# Patient Record
Sex: FEMALE | Race: WHITE | Employment: OTHER | URBAN - METROPOLITAN AREA
[De-identification: names, ages, dates, MRNs, and addresses within clinical notes are randomized per-mention and may not be internally consistent; named-entity substitution may affect disease eponyms.]

---

## 2022-09-29 ENCOUNTER — HOSPITAL ENCOUNTER (INPATIENT)
Age: 57
LOS: 13 days | Discharge: ANOTHER HOSPITAL | DRG: 426 | End: 2022-10-13
Attending: INTERNAL MEDICINE | Admitting: INTERNAL MEDICINE
Payer: MEDICAID

## 2022-09-29 ENCOUNTER — APPOINTMENT (OUTPATIENT)
Dept: CT IMAGING | Age: 57
DRG: 426 | End: 2022-09-29
Payer: MEDICAID

## 2022-09-29 ENCOUNTER — APPOINTMENT (OUTPATIENT)
Dept: GENERAL RADIOLOGY | Age: 57
DRG: 426 | End: 2022-09-29
Payer: MEDICAID

## 2022-09-29 DIAGNOSIS — R41.82 ACUTE ALTERATION IN MENTAL STATUS: Primary | ICD-10-CM

## 2022-09-29 DIAGNOSIS — E87.6 HYPOKALEMIA: ICD-10-CM

## 2022-09-29 DIAGNOSIS — Z86.69 HISTORY OF SEIZURE DISORDER: ICD-10-CM

## 2022-09-29 DIAGNOSIS — N17.9 ACUTE KIDNEY INJURY (HCC): ICD-10-CM

## 2022-09-29 DIAGNOSIS — R13.10 DYSPHAGIA, UNSPECIFIED TYPE: ICD-10-CM

## 2022-09-29 DIAGNOSIS — R62.50 DEVELOPMENTAL DELAY: ICD-10-CM

## 2022-09-29 DIAGNOSIS — Z86.73 HISTORY OF ISCHEMIC STROKE: ICD-10-CM

## 2022-09-29 LAB
ANION GAP SERPL CALCULATED.3IONS-SCNC: 14 MMOL/L (ref 3–16)
BASE EXCESS VENOUS: 6.3 MMOL/L
BASOPHILS ABSOLUTE: 0.1 K/UL (ref 0–0.2)
BASOPHILS RELATIVE PERCENT: 0.9 %
BUN BLDV-MCNC: 42 MG/DL (ref 7–20)
CALCIUM SERPL-MCNC: 9.2 MG/DL (ref 8.3–10.6)
CARBOXYHEMOGLOBIN: 0.6 %
CHLORIDE BLD-SCNC: 116 MMOL/L (ref 99–110)
CO2: 29 MMOL/L (ref 21–32)
CREAT SERPL-MCNC: 1.3 MG/DL (ref 0.6–1.1)
EOSINOPHILS ABSOLUTE: 0 K/UL (ref 0–0.6)
EOSINOPHILS RELATIVE PERCENT: 0.1 %
GFR AFRICAN AMERICAN: 51
GFR NON-AFRICAN AMERICAN: 42
GLUCOSE BLD-MCNC: 106 MG/DL (ref 70–99)
HCO3 VENOUS: 30 MMOL/L (ref 23–29)
HCT VFR BLD CALC: 40.1 % (ref 36–48)
HEMOGLOBIN: 12.6 G/DL (ref 12–16)
LACTIC ACID: 1.3 MMOL/L (ref 0.4–2)
LYMPHOCYTES ABSOLUTE: 1.3 K/UL (ref 1–5.1)
LYMPHOCYTES RELATIVE PERCENT: 12.5 %
MCH RBC QN AUTO: 23.4 PG (ref 26–34)
MCHC RBC AUTO-ENTMCNC: 31.4 G/DL (ref 31–36)
MCV RBC AUTO: 74.5 FL (ref 80–100)
METHEMOGLOBIN VENOUS: 0.6 %
MONOCYTES ABSOLUTE: 0.8 K/UL (ref 0–1.3)
MONOCYTES RELATIVE PERCENT: 7.7 %
NEUTROPHILS ABSOLUTE: 8.2 K/UL (ref 1.7–7.7)
NEUTROPHILS RELATIVE PERCENT: 78.8 %
O2 SAT, VEN: 99 %
O2 THERAPY: ABNORMAL
PCO2, VEN: 40.8 MMHG (ref 40–50)
PDW BLD-RTO: 19.9 % (ref 12.4–15.4)
PH VENOUS: 7.48 (ref 7.35–7.45)
PLATELET # BLD: 205 K/UL (ref 135–450)
PLATELET SLIDE REVIEW: ADEQUATE
PMV BLD AUTO: 10.8 FL (ref 5–10.5)
PO2, VEN: 167 MMHG
POTASSIUM SERPL-SCNC: 2.8 MMOL/L (ref 3.5–5.1)
RBC # BLD: 5.38 M/UL (ref 4–5.2)
SLIDE REVIEW: ABNORMAL
SODIUM BLD-SCNC: 159 MMOL/L (ref 136–145)
TCO2 CALC VENOUS: 32 MMOL/L
TOTAL CK: 424 U/L (ref 26–192)
WBC # BLD: 10.4 K/UL (ref 4–11)

## 2022-09-29 PROCEDURE — 85025 COMPLETE CBC W/AUTO DIFF WBC: CPT

## 2022-09-29 PROCEDURE — 83605 ASSAY OF LACTIC ACID: CPT

## 2022-09-29 PROCEDURE — 70450 CT HEAD/BRAIN W/O DYE: CPT

## 2022-09-29 PROCEDURE — 96365 THER/PROPH/DIAG IV INF INIT: CPT

## 2022-09-29 PROCEDURE — 99285 EMERGENCY DEPT VISIT HI MDM: CPT

## 2022-09-29 PROCEDURE — 96361 HYDRATE IV INFUSION ADD-ON: CPT

## 2022-09-29 PROCEDURE — 82803 BLOOD GASES ANY COMBINATION: CPT

## 2022-09-29 PROCEDURE — 71045 X-RAY EXAM CHEST 1 VIEW: CPT

## 2022-09-29 PROCEDURE — 6360000002 HC RX W HCPCS: Performed by: PHYSICIAN ASSISTANT

## 2022-09-29 PROCEDURE — 80048 BASIC METABOLIC PNL TOTAL CA: CPT

## 2022-09-29 PROCEDURE — 93005 ELECTROCARDIOGRAM TRACING: CPT | Performed by: INTERNAL MEDICINE

## 2022-09-29 PROCEDURE — 2580000003 HC RX 258: Performed by: PHYSICIAN ASSISTANT

## 2022-09-29 PROCEDURE — 82550 ASSAY OF CK (CPK): CPT

## 2022-09-29 PROCEDURE — 6360000004 HC RX CONTRAST MEDICATION: Performed by: PHYSICIAN ASSISTANT

## 2022-09-29 PROCEDURE — 96366 THER/PROPH/DIAG IV INF ADDON: CPT

## 2022-09-29 PROCEDURE — 71260 CT THORAX DX C+: CPT | Performed by: PHYSICIAN ASSISTANT

## 2022-09-29 PROCEDURE — 81001 URINALYSIS AUTO W/SCOPE: CPT

## 2022-09-29 RX ORDER — POTASSIUM CHLORIDE 7.45 MG/ML
10 INJECTION INTRAVENOUS
Status: DISPENSED | OUTPATIENT
Start: 2022-09-29 | End: 2022-09-30

## 2022-09-29 RX ORDER — 0.9 % SODIUM CHLORIDE 0.9 %
1000 INTRAVENOUS SOLUTION INTRAVENOUS ONCE
Status: COMPLETED | OUTPATIENT
Start: 2022-09-29 | End: 2022-09-29

## 2022-09-29 RX ORDER — 0.9 % SODIUM CHLORIDE 0.9 %
1000 INTRAVENOUS SOLUTION INTRAVENOUS ONCE
Status: COMPLETED | OUTPATIENT
Start: 2022-09-29 | End: 2022-09-30

## 2022-09-29 RX ADMIN — IOPAMIDOL 75 ML: 755 INJECTION, SOLUTION INTRAVENOUS at 20:23

## 2022-09-29 RX ADMIN — SODIUM CHLORIDE 1000 ML: 9 INJECTION, SOLUTION INTRAVENOUS at 18:53

## 2022-09-29 RX ADMIN — Medication 10 MEQ: at 22:14

## 2022-09-29 RX ADMIN — SODIUM CHLORIDE 1000 ML: 9 INJECTION, SOLUTION INTRAVENOUS at 22:11

## 2022-09-29 ASSESSMENT — PAIN - FUNCTIONAL ASSESSMENT: PAIN_FUNCTIONAL_ASSESSMENT: ADULT NONVERBAL PAIN SCALE (NPVS)

## 2022-09-29 NOTE — ED PROVIDER NOTES
[address] [facility]        Pt Name: Gerson Prajapati  MRN: [id number]  [place] 1965  Date of evaluation: 9/29/2022  Provider: Michelle Mcarthur PA-C  PCP: Reva Le MD  Note Started: 7:01 PM EDT      KIM. I have evaluated this patient. My supervising physician was available for consultation. Triage CHIEF COMPLAINT       Chief Complaint   Patient presents with    Altered Mental Status     patient comes with report of being \"unresponsive for 9 hours\" typically alert and converses per ems report. ems reports patient did not receive any medications today         HISTORY OF PRESENT ILLNESS   (Location/Symptom, Timing/Onset, Context/Setting, Quality, Duration, Modifying Factors, Severity)  Note limiting factors. Chief Complaint: Unresponsive for 9 hours    Gerson Prajapati is a 62 y.o. female who presents with history given as above with patient coming in from the nursing facility. Nursing here in the ER also tells me patient has been unresponsive for at least 9 hours today with no medicines given. However on prompting, patient does open her eyes, responds to pain, and otherwise very much with altered mental status. Reportedly patient typically is conversive and responsive. I see in the patient's electronic medical record that she has a history of sickle cell disease, ischemic stroke that appears may be to have happened during the summer 2022, seizure disorder, developmental delay, possible lupus, previous admission for altered mental status, no anticoagulation. On Plavix and aspirin but no other anticoagulation. I do not see history of DVT or PE. Nursing Notes were all reviewed and agreed with or any disagreements were addressed in the HPI.     REVIEW OF SYSTEMS    (2-9 systems for level 4, 10 or more for level 5)     Review of Systems  Unable to be obtained secondary to patient's altered mental status  PAST MEDICAL HISTORY   History reviewed. No pertinent past medical history. SURGICAL HISTORY   History reviewed. No pertinent surgical history. CURRENTMEDICATIONS       Previous Medications    No medications on file       ALLERGIES     Patient has no known allergies. FAMILYHISTORY     History reviewed. No pertinent family history. SOCIAL HISTORY       Social History     Socioeconomic History    Marital status: Single     Spouse name: None    Number of children: None    Years of education: None    Highest education level: None       SCREENINGS    Gwyn Coma Scale  Eye Opening: None  Best Verbal Response: None  Best Motor Response: None  Gwyn Coma Scale Score: 3  OPO Notified: Not yet        PHYSICAL EXAM    (up to 7 for level 4, 8 or more for level 5)     ED Triage Vitals [09/29/22 1824]   BP Temp Temp src Heart Rate Resp SpO2 Height Weight   96/79 98.5 °F (36.9 °C) -- (!) 125 16 100 % -- 138 lb 10.7 oz (62.9 kg)       Physical Exam  Vitals and nursing note reviewed. Constitutional:       Appearance: She is ill-appearing. She is not diaphoretic. HENT:      Head: Normocephalic and atraumatic. Right Ear: External ear normal.      Left Ear: External ear normal.      Nose: Nose normal.      Mouth/Throat:      Mouth: Mucous membranes are dry. Pharynx: No posterior oropharyngeal erythema. Eyes:      General:         Right eye: No discharge. Left eye: No discharge. Conjunctiva/sclera: Conjunctivae normal.   Cardiovascular:      Rate and Rhythm: Tachycardia present. Pulses: Normal pulses. Heart sounds: Normal heart sounds. Pulmonary:      Effort: Pulmonary effort is normal. No respiratory distress. Abdominal:      General: Bowel sounds are normal.      Palpations: Abdomen is soft. Tenderness: There is no abdominal tenderness. There is no right CVA tenderness or left CVA tenderness. Musculoskeletal:         General: No swelling or tenderness.       Cervical back: Normal range of motion and neck supple. No tenderness. Right lower leg: No edema. Left lower leg: No edema. Lymphadenopathy:      Cervical: No cervical adenopathy. Skin:     General: Skin is warm and dry. Capillary Refill: Capillary refill takes 2 to 3 seconds. Comments: Very dry appearing skin   Neurological:      Mental Status: She is oriented to person, place, and time. She is lethargic. GCS: GCS eye subscore is 2. GCS verbal subscore is 1. GCS motor subscore is 4. Comments: Very minimal response other than to pain and to opening her eyes   Psychiatric:         Mood and Affect: Mood normal.         Behavior: Behavior normal.       DIAGNOSTIC RESULTS   LABS:    Labs Reviewed   CBC WITH AUTO DIFFERENTIAL - Abnormal; Notable for the following components:       Result Value    RBC 5.38 (*)     MCV 74.5 (*)     MCH 23.4 (*)     RDW 19.9 (*)     MPV 10.8 (*)     Neutrophils Absolute 8.2 (*)     All other components within normal limits   BASIC METABOLIC PANEL - Abnormal; Notable for the following components:    Sodium 159 (*)     Potassium 2.8 (*)     Chloride 116 (*)     Glucose 106 (*)     BUN 42 (*)     Creatinine 1.3 (*)     GFR Non- 42 (*)     GFR  51 (*)     All other components within normal limits    Narrative:     Kostafarhad Bairon tel. [phone],  Chemistry results called to and read back by Caio Lynn, 09/29/2022  20:03, by Raquel Santa - Abnormal; Notable for the following components:    Ketones, Urine TRACE (*)     Protein, UA 30 (*)     All other components within normal limits   CK - Abnormal; Notable for the following components:     Total  (*)     All other components within normal limits   BLOOD GAS, VENOUS - Abnormal; Notable for the following components:    pH, Greg 7.480 (*)     HCO3, Venous 30 (*)     All other components within normal limits   LACTIC ACID   LACTATE, SEPSIS   LACTATE, SEPSIS MICROSCOPIC URINALYSIS       When ordered, only abnormal lab results are displayed. All other labs were within normal range or not returned as of this dictation. EKG: When ordered, EKG's are interpreted by the Emergency Department Physician in the absence of a cardiologist.  Please see their note for interpretation of EKG. RADIOLOGY:   Non-plain film images such as CT, Ultrasound and MRI are read by the radiologist. Plain radiographic images are visualized andpreliminarily interpreted by the  ED Provider with the below findings:        Interpretation perthe Radiologist below, if available at the time of this note:    CT HEAD WO CONTRAST   Final Result   1. No acute intracranial bleed. 2. Multifocal age indeterminate lacunar strokes involve right centrum   semiovale, bilateral basal ganglia and left caudate lobe. 3.  White matter hypoattenuation described is typical of microvascular   ischemic disease or as sequela of dysmyelinating/demyelinating processes. 4.  Vascular calcifications are noted reflecting calcific atherosclerosis. CT CHEST PULMONARY EMBOLISM W CONTRAST   Preliminary Result   No evidence of pulmonary embolism or acute pulmonary abnormality. Mild left   lower lobe atelectatic changes. XR CHEST PORTABLE   Final Result   No radiographic evidence of acute cardiopulmonary disease. XR CHEST PORTABLE    Result Date: 9/29/2022  EXAMINATION: ONE XRAY VIEW OF THE CHEST 9/29/2022 6:48 pm COMPARISON: None. HISTORY: ORDERING SYSTEM PROVIDED HISTORY: altered mental status TECHNOLOGIST PROVIDED HISTORY: Reason for exam:->altered mental status Reason for Exam: ams FINDINGS: The cardiomediastinal and hilar silhouettes appear unremarkable. The lungs appear clear. No pleural effusion evident. No pneumothorax is seen. No acute osseous abnormality is identified. Sequela from ORIF bilateral clavicular fractures with fixation plates and screws.      No radiographic evidence of acute cardiopulmonary disease. PROCEDURES   Unless otherwise noted below, none     Procedures    CRITICAL CARE TIME   Critical Care  There was a high probability of life-threatening clinical deterioration in the patient's condition requiring my urgent intervention. Total critical care time with the patient was greater than 30 minutes excluding separately reportable procedures. Critical care required due to patients altered mental status with hyperkalemia, right acute kidney injury, elevated heart rate, elevated CK and requiring 2 L of fluids and potassium replenishment as well as admission for further care and treatment. I personally saw the patient and the care time documented above is independently provided for greater than 30 minutes and is not concurrent critical care out of the total shared critical care time provided. CONSULTS:  None      EMERGENCY DEPARTMENT COURSE and DIFFERENTIAL DIAGNOSIS/MDM:   Vitals:    Vitals:    09/29/22 1845 09/29/22 2145 09/29/22 2315 09/29/22 2330   BP: 105/81  113/80 104/71   Pulse: (!) 125 (!) 107 96 95   Resp: 20  16 14   Temp:       SpO2: 95%  99% 98%   Weight:           Patient was given thefollowing medications:  Medications   potassium chloride 10 mEq/100 mL IVPB (Peripheral Line) (10 mEq IntraVENous New Bag 9/29/22 2214)   0.9 % sodium chloride bolus (0 mLs IntraVENous Stopped 9/29/22 2208)   iopamidol (ISOVUE-370) 76 % injection 75 mL (75 mLs IntraVENous Given 9/29/22 2023)   0.9 % sodium chloride bolus (1,000 mLs IntraVENous New Bag 9/29/22 2211)         Is this patient to be included in the SEP-1 Core Measure due to severe sepsis or septic shock? No   Exclusion criteria - the patient is NOT to be included for SEP-1 Core Measure due to: Infection is not suspected  This patient presents as above and evaluation and treatment is begun here including IV fluids as patient does appear dry.   EKG and chest x-ray as well as CT head ordered as well as some lab work.  Chest x-ray returns as above. Also CT chest rule out PE and CT head plain return negative as above. Lactic acid not elevated. BMP however shows significant electrolyte derangement with potassium 2.8, sodium 159, chloride 116, and acute kidney injury with BUN 42 with creatinine 1.3. Patient typically has no ongoing kidney disease per electronic medical record. Urinalysis shows trace ketones and 30 protein. Low sodium starting to be addressed in the emergency department with IV. Patient's CBC shows no acute leukocytosis or thrombocytopenia. Blood gas reasonably good. Patient's CK is quite elevated at 424. Patient has received 2 L of fluids while in the emergency department. As time progresses, patient appears to be a bit more wakeful, however still pretty subdued and minimally interactive, not verbally interactive. She does need to be admitted for further care and treatment and consultation to hospitalist is placed. Patient is in fair condition. I am the Primary Clinician of Record. FINAL IMPRESSION      1. Acute alteration in mental status    2. Acute kidney injury ([facility] [address]    3. Hypokalemia    4. Developmental delay    5. History of ischemic stroke    6. History of seizure disorder          DISPOSITION/PLAN   DISPOSITION Decision To Admit 09/29/2022 09:45:59 PM      PATIENT REFERREDTO:  No follow-up provider specified.     DISCHARGE MEDICATIONS:  New Prescriptions    No medications on file       DISCONTINUED MEDICATIONS:  Discontinued Medications    No medications on file              (Please note that portions ofthis note were completed with a voice recognition program.  Efforts were made to edit the dictations but occasionally words are mis-transcribed.)    Kelley Pedro PA-C (electronically signed)             Kelley Pedro PA-C  09/29/22 8661

## 2022-09-30 PROBLEM — N17.9 AKI (ACUTE KIDNEY INJURY) (HCC): Status: ACTIVE | Noted: 2022-09-30

## 2022-09-30 PROBLEM — E87.6 HYPOKALEMIA: Status: ACTIVE | Noted: 2022-09-30

## 2022-09-30 PROBLEM — R41.82 ALTERED MENTAL STATUS: Status: ACTIVE | Noted: 2022-09-30

## 2022-09-30 PROBLEM — E87.0 HYPERNATREMIA: Status: ACTIVE | Noted: 2022-09-30

## 2022-09-30 LAB
ALBUMIN SERPL-MCNC: 3.1 G/DL (ref 3.4–5)
ALP BLD-CCNC: 67 U/L (ref 40–129)
ALT SERPL-CCNC: 30 U/L (ref 10–40)
ANION GAP SERPL CALCULATED.3IONS-SCNC: 12 MMOL/L (ref 3–16)
ANION GAP SERPL CALCULATED.3IONS-SCNC: 13 MMOL/L (ref 3–16)
ANION GAP SERPL CALCULATED.3IONS-SCNC: 14 MMOL/L (ref 3–16)
ANION GAP SERPL CALCULATED.3IONS-SCNC: 7 MMOL/L (ref 3–16)
AST SERPL-CCNC: 40 U/L (ref 15–37)
BACTERIA: NORMAL /HPF
BILIRUB SERPL-MCNC: 0.4 MG/DL (ref 0–1)
BILIRUBIN DIRECT: <0.2 MG/DL (ref 0–0.3)
BILIRUBIN URINE: NEGATIVE
BILIRUBIN, INDIRECT: ABNORMAL MG/DL (ref 0–1)
BLOOD, URINE: NEGATIVE
BUN BLDV-MCNC: 29 MG/DL (ref 7–20)
BUN BLDV-MCNC: 34 MG/DL (ref 7–20)
BUN BLDV-MCNC: 34 MG/DL (ref 7–20)
BUN BLDV-MCNC: 37 MG/DL (ref 7–20)
CALCIUM SERPL-MCNC: 8.4 MG/DL (ref 8.3–10.6)
CALCIUM SERPL-MCNC: 8.4 MG/DL (ref 8.3–10.6)
CALCIUM SERPL-MCNC: 8.6 MG/DL (ref 8.3–10.6)
CALCIUM SERPL-MCNC: 8.6 MG/DL (ref 8.3–10.6)
CHLORIDE BLD-SCNC: 119 MMOL/L (ref 99–110)
CHLORIDE BLD-SCNC: 121 MMOL/L (ref 99–110)
CHLORIDE BLD-SCNC: 122 MMOL/L (ref 99–110)
CHLORIDE BLD-SCNC: 123 MMOL/L (ref 99–110)
CLARITY: CLEAR
CO2: 25 MMOL/L (ref 21–32)
CO2: 25 MMOL/L (ref 21–32)
CO2: 26 MMOL/L (ref 21–32)
CO2: 29 MMOL/L (ref 21–32)
COLOR: YELLOW
CREAT SERPL-MCNC: 0.7 MG/DL (ref 0.6–1.1)
CREAT SERPL-MCNC: 0.7 MG/DL (ref 0.6–1.1)
CREAT SERPL-MCNC: 0.8 MG/DL (ref 0.6–1.1)
CREAT SERPL-MCNC: 0.8 MG/DL (ref 0.6–1.1)
EKG ATRIAL RATE: 126 BPM
EKG DIAGNOSIS: NORMAL
EKG P AXIS: 36 DEGREES
EKG P-R INTERVAL: 148 MS
EKG Q-T INTERVAL: 330 MS
EKG QRS DURATION: 96 MS
EKG QTC CALCULATION (BAZETT): 477 MS
EKG R AXIS: -81 DEGREES
EKG T AXIS: 40 DEGREES
EKG VENTRICULAR RATE: 126 BPM
EPITHELIAL CELLS, UA: 3 /HPF (ref 0–5)
GFR AFRICAN AMERICAN: >60
GFR NON-AFRICAN AMERICAN: >60
GLUCOSE BLD-MCNC: 106 MG/DL (ref 70–99)
GLUCOSE BLD-MCNC: 112 MG/DL (ref 70–99)
GLUCOSE BLD-MCNC: 114 MG/DL (ref 70–99)
GLUCOSE BLD-MCNC: 92 MG/DL (ref 70–99)
GLUCOSE URINE: NEGATIVE MG/DL
HCT VFR BLD CALC: 34.4 % (ref 36–48)
HEMOGLOBIN: 10.7 G/DL (ref 12–16)
HYALINE CASTS: 0 /LPF (ref 0–8)
KETONES, URINE: ABNORMAL MG/DL
LACTIC ACID, SEPSIS: 1 MMOL/L (ref 0.4–1.9)
LEUKOCYTE ESTERASE, URINE: NEGATIVE
MAGNESIUM: 2.1 MG/DL (ref 1.8–2.4)
MAGNESIUM: 2.2 MG/DL (ref 1.8–2.4)
MCH RBC QN AUTO: 22.8 PG (ref 26–34)
MCHC RBC AUTO-ENTMCNC: 31.1 G/DL (ref 31–36)
MCV RBC AUTO: 73.3 FL (ref 80–100)
MICROSCOPIC EXAMINATION: YES
NITRITE, URINE: NEGATIVE
OSMOLALITY: 341 MOSM/KG (ref 275–295)
PDW BLD-RTO: 19.6 % (ref 12.4–15.4)
PH UA: 5.5 (ref 5–8)
PHOSPHORUS: 2.4 MG/DL (ref 2.5–4.9)
PHOSPHORUS: 2.8 MG/DL (ref 2.5–4.9)
PLATELET # BLD: 165 K/UL (ref 135–450)
PMV BLD AUTO: 11.4 FL (ref 5–10.5)
POTASSIUM SERPL-SCNC: 3 MMOL/L (ref 3.5–5.1)
POTASSIUM SERPL-SCNC: 3 MMOL/L (ref 3.5–5.1)
POTASSIUM SERPL-SCNC: 3.1 MMOL/L (ref 3.5–5.1)
POTASSIUM SERPL-SCNC: 3.2 MMOL/L (ref 3.5–5.1)
PROTEIN UA: 30 MG/DL
RBC # BLD: 4.69 M/UL (ref 4–5.2)
RBC UA: 1 /HPF (ref 0–4)
SODIUM BLD-SCNC: 152 MMOL/L (ref 136–145)
SODIUM BLD-SCNC: 155 MMOL/L (ref 136–145)
SODIUM BLD-SCNC: 159 MMOL/L (ref 136–145)
SODIUM BLD-SCNC: 159 MMOL/L (ref 136–145)
SODIUM BLD-SCNC: 163 MMOL/L (ref 136–145)
SPECIFIC GRAVITY UA: 1.08 (ref 1–1.03)
TOTAL CK: 324 U/L (ref 26–192)
TOTAL PROTEIN: 6.4 G/DL (ref 6.4–8.2)
URINE REFLEX TO CULTURE: ABNORMAL
URINE TYPE: ABNORMAL
UROBILINOGEN, URINE: 1 E.U./DL
WBC # BLD: 12.2 K/UL (ref 4–11)
WBC UA: 1 /HPF (ref 0–5)

## 2022-09-30 PROCEDURE — 9990000010 HC NO CHARGE VISIT

## 2022-09-30 PROCEDURE — 6360000002 HC RX W HCPCS: Performed by: INTERNAL MEDICINE

## 2022-09-30 PROCEDURE — 80069 RENAL FUNCTION PANEL: CPT

## 2022-09-30 PROCEDURE — 82550 ASSAY OF CK (CPK): CPT

## 2022-09-30 PROCEDURE — 6360000002 HC RX W HCPCS: Performed by: PHYSICIAN ASSISTANT

## 2022-09-30 PROCEDURE — 84100 ASSAY OF PHOSPHORUS: CPT

## 2022-09-30 PROCEDURE — 85027 COMPLETE CBC AUTOMATED: CPT

## 2022-09-30 PROCEDURE — 36415 COLL VENOUS BLD VENIPUNCTURE: CPT

## 2022-09-30 PROCEDURE — 84295 ASSAY OF SERUM SODIUM: CPT

## 2022-09-30 PROCEDURE — 83735 ASSAY OF MAGNESIUM: CPT

## 2022-09-30 PROCEDURE — 2580000003 HC RX 258: Performed by: INTERNAL MEDICINE

## 2022-09-30 PROCEDURE — 1200000000 HC SEMI PRIVATE

## 2022-09-30 PROCEDURE — 80076 HEPATIC FUNCTION PANEL: CPT

## 2022-09-30 PROCEDURE — 83605 ASSAY OF LACTIC ACID: CPT

## 2022-09-30 PROCEDURE — 83930 ASSAY OF BLOOD OSMOLALITY: CPT

## 2022-09-30 PROCEDURE — 93010 ELECTROCARDIOGRAM REPORT: CPT | Performed by: INTERNAL MEDICINE

## 2022-09-30 RX ORDER — CLOPIDOGREL BISULFATE 75 MG/1
75 TABLET ORAL DAILY
Status: DISCONTINUED | OUTPATIENT
Start: 2022-09-30 | End: 2022-10-13 | Stop reason: HOSPADM

## 2022-09-30 RX ORDER — ASPIRIN 81 MG/1
81 TABLET, CHEWABLE ORAL DAILY
Status: ON HOLD | COMMUNITY
End: 2022-11-01 | Stop reason: HOSPADM

## 2022-09-30 RX ORDER — POTASSIUM CHLORIDE 7.45 MG/ML
10 INJECTION INTRAVENOUS
Status: COMPLETED | OUTPATIENT
Start: 2022-09-30 | End: 2022-09-30

## 2022-09-30 RX ORDER — LEVETIRACETAM 5 MG/ML
500 INJECTION INTRAVASCULAR EVERY 12 HOURS
Status: DISCONTINUED | OUTPATIENT
Start: 2022-09-30 | End: 2022-10-05

## 2022-09-30 RX ORDER — LEVETIRACETAM 500 MG/1
500 TABLET ORAL 2 TIMES DAILY
Status: DISCONTINUED | OUTPATIENT
Start: 2022-09-30 | End: 2022-09-30

## 2022-09-30 RX ORDER — LISINOPRIL 40 MG/1
40 TABLET ORAL DAILY
Status: ON HOLD | COMMUNITY
End: 2022-11-01 | Stop reason: HOSPADM

## 2022-09-30 RX ORDER — POTASSIUM CHLORIDE 20 MEQ/1
40 TABLET, EXTENDED RELEASE ORAL PRN
Status: DISCONTINUED | OUTPATIENT
Start: 2022-09-30 | End: 2022-10-13 | Stop reason: HOSPADM

## 2022-09-30 RX ORDER — MULTIVITAMIN WITH IRON
1 TABLET ORAL DAILY
Status: DISCONTINUED | OUTPATIENT
Start: 2022-09-30 | End: 2022-10-13

## 2022-09-30 RX ORDER — SODIUM CHLORIDE 9 MG/ML
INJECTION, SOLUTION INTRAVENOUS PRN
Status: DISCONTINUED | OUTPATIENT
Start: 2022-09-30 | End: 2022-10-13 | Stop reason: HOSPADM

## 2022-09-30 RX ORDER — VITAMIN B COMPLEX
1000 TABLET ORAL DAILY
Status: DISCONTINUED | OUTPATIENT
Start: 2022-09-30 | End: 2022-10-13

## 2022-09-30 RX ORDER — VERAPAMIL HYDROCHLORIDE 120 MG/1
120 TABLET, FILM COATED ORAL NIGHTLY
Status: DISCONTINUED | OUTPATIENT
Start: 2022-09-30 | End: 2022-10-13

## 2022-09-30 RX ORDER — SODIUM CHLORIDE 0.9 % (FLUSH) 0.9 %
5-40 SYRINGE (ML) INJECTION EVERY 12 HOURS SCHEDULED
Status: DISCONTINUED | OUTPATIENT
Start: 2022-09-30 | End: 2022-10-13 | Stop reason: HOSPADM

## 2022-09-30 RX ORDER — 0.9 % SODIUM CHLORIDE 0.9 %
1000 INTRAVENOUS SOLUTION INTRAVENOUS ONCE
Status: DISCONTINUED | OUTPATIENT
Start: 2022-09-30 | End: 2022-09-30

## 2022-09-30 RX ORDER — GAUZE BANDAGE 2" X 2"
100 BANDAGE TOPICAL DAILY
Status: DISCONTINUED | OUTPATIENT
Start: 2022-09-30 | End: 2022-10-13

## 2022-09-30 RX ORDER — LANOLIN ALCOHOL/MO/W.PET/CERES
1000 CREAM (GRAM) TOPICAL DAILY
Status: DISCONTINUED | OUTPATIENT
Start: 2022-09-30 | End: 2022-10-13

## 2022-09-30 RX ORDER — ACETAMINOPHEN 325 MG/1
650 TABLET ORAL EVERY 6 HOURS PRN
Status: DISCONTINUED | OUTPATIENT
Start: 2022-09-30 | End: 2022-10-13 | Stop reason: HOSPADM

## 2022-09-30 RX ORDER — MIRTAZAPINE 15 MG/1
7.5 TABLET, FILM COATED ORAL DAILY
Status: DISCONTINUED | OUTPATIENT
Start: 2022-09-30 | End: 2022-10-13

## 2022-09-30 RX ORDER — MIRTAZAPINE 7.5 MG/1
7.5 TABLET, FILM COATED ORAL DAILY
Status: ON HOLD | COMMUNITY
End: 2022-11-01 | Stop reason: HOSPADM

## 2022-09-30 RX ORDER — THIAMINE MONONITRATE (VIT B1) 100 MG
100 TABLET ORAL DAILY
Status: ON HOLD | COMMUNITY
End: 2022-11-01 | Stop reason: HOSPADM

## 2022-09-30 RX ORDER — CLOPIDOGREL BISULFATE 75 MG/1
75 TABLET ORAL DAILY
COMMUNITY
End: 2022-10-31

## 2022-09-30 RX ORDER — LANOLIN ALCOHOL/MO/W.PET/CERES
6 CREAM (GRAM) TOPICAL NIGHTLY
Status: ON HOLD | COMMUNITY
End: 2022-11-01 | Stop reason: HOSPADM

## 2022-09-30 RX ORDER — ATORVASTATIN CALCIUM 80 MG/1
80 TABLET, FILM COATED ORAL NIGHTLY
Status: DISCONTINUED | OUTPATIENT
Start: 2022-09-30 | End: 2022-10-13

## 2022-09-30 RX ORDER — FERROUS SULFATE TAB EC 324 MG (65 MG FE EQUIVALENT) 324 (65 FE) MG
324 TABLET DELAYED RESPONSE ORAL EVERY OTHER DAY
Status: DISCONTINUED | OUTPATIENT
Start: 2022-10-01 | End: 2022-10-04

## 2022-09-30 RX ORDER — SODIUM CHLORIDE 0.9 % (FLUSH) 0.9 %
5-40 SYRINGE (ML) INJECTION PRN
Status: DISCONTINUED | OUTPATIENT
Start: 2022-09-30 | End: 2022-10-13 | Stop reason: HOSPADM

## 2022-09-30 RX ORDER — ONDANSETRON 4 MG/1
4 TABLET, ORALLY DISINTEGRATING ORAL EVERY 8 HOURS PRN
Status: DISCONTINUED | OUTPATIENT
Start: 2022-09-30 | End: 2022-10-13 | Stop reason: HOSPADM

## 2022-09-30 RX ORDER — FERROUS SULFATE 325(65) MG
325 TABLET ORAL EVERY OTHER DAY
Status: ON HOLD | COMMUNITY
End: 2022-11-01 | Stop reason: HOSPADM

## 2022-09-30 RX ORDER — LANOLIN ALCOHOL/MO/W.PET/CERES
1000 CREAM (GRAM) TOPICAL DAILY
Status: ON HOLD | COMMUNITY
End: 2022-11-01 | Stop reason: HOSPADM

## 2022-09-30 RX ORDER — POLYETHYLENE GLYCOL 3350 17 G/17G
17 POWDER, FOR SOLUTION ORAL DAILY PRN
Status: DISCONTINUED | OUTPATIENT
Start: 2022-09-30 | End: 2022-10-06 | Stop reason: SDUPTHER

## 2022-09-30 RX ORDER — LEVETIRACETAM 500 MG/1
500 TABLET ORAL 2 TIMES DAILY
Status: ON HOLD | COMMUNITY
End: 2022-10-17 | Stop reason: HOSPADM

## 2022-09-30 RX ORDER — ELECTROLYTES/DEXTROSE
1 SOLUTION, ORAL ORAL DAILY
Status: ON HOLD | COMMUNITY
End: 2022-11-01 | Stop reason: HOSPADM

## 2022-09-30 RX ORDER — ENOXAPARIN SODIUM 100 MG/ML
40 INJECTION SUBCUTANEOUS DAILY
Status: DISCONTINUED | OUTPATIENT
Start: 2022-09-30 | End: 2022-09-30

## 2022-09-30 RX ORDER — ONDANSETRON 2 MG/ML
4 INJECTION INTRAMUSCULAR; INTRAVENOUS EVERY 6 HOURS PRN
Status: DISCONTINUED | OUTPATIENT
Start: 2022-09-30 | End: 2022-10-13 | Stop reason: HOSPADM

## 2022-09-30 RX ORDER — DEXTROSE MONOHYDRATE 50 MG/ML
INJECTION, SOLUTION INTRAVENOUS CONTINUOUS
Status: DISCONTINUED | OUTPATIENT
Start: 2022-09-30 | End: 2022-10-05

## 2022-09-30 RX ORDER — ATORVASTATIN CALCIUM 80 MG/1
80 TABLET, FILM COATED ORAL NIGHTLY
Status: ON HOLD | COMMUNITY
End: 2022-11-01 | Stop reason: HOSPADM

## 2022-09-30 RX ORDER — POTASSIUM CHLORIDE 7.45 MG/ML
10 INJECTION INTRAVENOUS
Status: DISCONTINUED | OUTPATIENT
Start: 2022-09-30 | End: 2022-09-30

## 2022-09-30 RX ORDER — ASPIRIN 81 MG/1
81 TABLET, CHEWABLE ORAL DAILY
Status: DISCONTINUED | OUTPATIENT
Start: 2022-09-30 | End: 2022-10-13

## 2022-09-30 RX ORDER — ACETAMINOPHEN 650 MG/1
650 SUPPOSITORY RECTAL EVERY 6 HOURS PRN
Status: DISCONTINUED | OUTPATIENT
Start: 2022-09-30 | End: 2022-10-13 | Stop reason: HOSPADM

## 2022-09-30 RX ORDER — POTASSIUM CHLORIDE 7.45 MG/ML
10 INJECTION INTRAVENOUS PRN
Status: DISCONTINUED | OUTPATIENT
Start: 2022-09-30 | End: 2022-10-13 | Stop reason: HOSPADM

## 2022-09-30 RX ORDER — VERAPAMIL HYDROCHLORIDE 120 MG/1
120 TABLET, FILM COATED ORAL NIGHTLY
Status: ON HOLD | COMMUNITY
End: 2022-11-01 | Stop reason: HOSPADM

## 2022-09-30 RX ORDER — LANOLIN ALCOHOL/MO/W.PET/CERES
6 CREAM (GRAM) TOPICAL NIGHTLY
Status: DISCONTINUED | OUTPATIENT
Start: 2022-09-30 | End: 2022-10-13

## 2022-09-30 RX ORDER — ENOXAPARIN SODIUM 100 MG/ML
30 INJECTION SUBCUTANEOUS DAILY
Status: DISCONTINUED | OUTPATIENT
Start: 2022-09-30 | End: 2022-10-13 | Stop reason: HOSPADM

## 2022-09-30 RX ADMIN — DEXTROSE MONOHYDRATE: 2.5 INJECTION INTRAVENOUS at 20:02

## 2022-09-30 RX ADMIN — POTASSIUM CHLORIDE 10 MEQ: 7.46 INJECTION, SOLUTION INTRAVENOUS at 21:07

## 2022-09-30 RX ADMIN — POTASSIUM CHLORIDE 10 MEQ: 7.46 INJECTION, SOLUTION INTRAVENOUS at 17:36

## 2022-09-30 RX ADMIN — Medication 10 MEQ: at 06:16

## 2022-09-30 RX ADMIN — POTASSIUM CHLORIDE 10 MEQ: 7.46 INJECTION, SOLUTION INTRAVENOUS at 14:23

## 2022-09-30 RX ADMIN — ENOXAPARIN SODIUM 30 MG: 100 INJECTION SUBCUTANEOUS at 07:33

## 2022-09-30 RX ADMIN — POTASSIUM CHLORIDE 10 MEQ: 7.46 INJECTION, SOLUTION INTRAVENOUS at 20:03

## 2022-09-30 RX ADMIN — Medication 10 MEQ: at 05:03

## 2022-09-30 RX ADMIN — POTASSIUM CHLORIDE 10 MEQ: 7.46 INJECTION, SOLUTION INTRAVENOUS at 19:02

## 2022-09-30 RX ADMIN — DEXTROSE MONOHYDRATE: 2.5 INJECTION INTRAVENOUS at 06:21

## 2022-09-30 RX ADMIN — SODIUM CHLORIDE, PRESERVATIVE FREE 10 ML: 5 INJECTION INTRAVENOUS at 21:41

## 2022-09-30 RX ADMIN — LEVETIRACETAM 500 MG: 500 INJECTION, SOLUTION INTRAVENOUS at 23:59

## 2022-09-30 RX ADMIN — Medication 10 MEQ: at 01:48

## 2022-09-30 RX ADMIN — LEVETIRACETAM 500 MG: 500 INJECTION, SOLUTION INTRAVENOUS at 12:10

## 2022-09-30 RX ADMIN — POTASSIUM CHLORIDE 10 MEQ: 7.46 INJECTION, SOLUTION INTRAVENOUS at 16:09

## 2022-09-30 ASSESSMENT — PAIN SCALES - WONG BAKER: WONGBAKER_NUMERICALRESPONSE: 0

## 2022-09-30 NOTE — CARE COORDINATION
Patient came from  Name: [facility]  Address:  [place], [place], [place]   Phone:  [phone]  prior to arrival.    Call to Yosef Llanos, at [phone] who confirmed the patient is:  [] [address], no Precert required for return.  [] [facility] will be required for return. [] Skilled Nursing Care, no Precert required for return. [x] [address] required for return. [x] Is fully vaccinated for Covid (needs second booster). [] Has started Covid vaccination, but is not complete. [] Is not vaccinated for Covid. [] No Covid Test needed for return. [x] Rapid Covid test needed before return within: [x] 48 hours, [x] 72 hours, [] 5 days, [] other   [] PCR Covid test needed before return.    [] Confirmed with the patient or family that the plan is to return to this facility at D/C. [] Patient and/or designated decision maker does not plan for the patient to return to this facility at D/C.      Electronically signed by Ceferino Guzman RN on 9/30/2022 at 11:55 AM

## 2022-09-30 NOTE — H&P
Hospital Medicine History & Physical      PCP: Ana Stoddard MD    Date of Admission: 9/29/2022    Date of Service: Pt seen/examined on 09/30/22 and Admitted to Inpatient with expected LOS greater than two midnights due to medical therapy. Chief Complaint: Altered mental status      History Of Present Illness:      Sim Esparza is 62 y.o. female with history of developmental delay, seizure disorder, stroke, sickle cell disease. Per report, at baseline patient is verbally interactive but since yesterday patient has not been talking  She is brought to the ED from her nursing home for altered mental status  On interview, she does not say a word although she is awake. She is unable to answer any questions to me. She can make eye contact and withdraws from  Work-up reveals severe hypernatremia sodium 159, hypokalemia potassium 2.8 and GARRETT which suggest severe dehydration        Past Medical History: Sickle cell disease, history of seizure    Past Surgical History: No known surgical history    History reviewed. No pertinent surgical history. Medications Prior to Admission: She denies she takes any medications at home     Allergies:  Patient has no known allergies. Social History:      The patient currently lives at home    TOBACCO:   has no history on file for tobacco use. ETOH:   has no history on file for alcohol use. E-cigarette/Vaping       Questions Responses    E-cigarette/Vaping Use     Start Date     Passive Exposure     Quit Date     Counseling Given     Comments               Family History:      Reviewed and negative in regards to presenting illness/complaint. History reviewed. No pertinent family history. REVIEW OF SYSTEMS COMPLETED:   Pertinent positives as noted in the HPI. All other systems reviewed and negative.     PHYSICAL EXAM PERFORMED:    /84   Pulse 97   Temp 98 °F (36.7 °C) (Oral)   Resp 18   Ht 5' (1.524 m)   Wt 138 lb 10.7 oz (62.9 kg)   SpO2 95%   BMI 27.08 kg/m²     General appearance:  No apparent distress, appears stated age and cooperative. HEENT:  Normal cephalic, atraumatic without obvious deformity. Pupils equal, round, and reactive to light. Extra ocular muscles intact. Conjunctivae/corneas clear. Neck: Supple, with full range of motion. No jugular venous distention. Trachea midline. Respiratory:  Normal respiratory effort. Clear to auscultation, bilaterally without Rales/Wheezes/Rhonchi. Cardiovascular:  Regular rate and rhythm with normal S1/S2 without murmurs, rubs or gallops. Abdomen: Soft, non-tender, non-distended with normal bowel sounds. Musculoskeletal:  No clubbing, cyanosis or edema bilaterally. Full range of motion without deformity. Skin: Skin color, texture, turgor normal.  No rashes or lesions. Neurologic:  Neurovascularly intact without any focal sensory/motor deficits. Cranial nerves: II-XII intact, grossly non-focal.  Psychiatric:  Alert and oriented, thought content appropriate, normal insight  Capillary Refill: Brisk,3 seconds, normal  Peripheral Pulses: +2 palpable, equal bilaterally       Labs:     Recent Labs     09/29/22  1833   WBC 10.4   HGB 12.6   HCT 40.1        Recent Labs     09/29/22  1833   *   K 2.8*   *   CO2 29   BUN 42*   CREATININE 1.3*   CALCIUM 9.2     No results for input(s): AST, ALT, BILIDIR, BILITOT, ALKPHOS in the last 72 hours. No results for input(s): INR in the last 72 hours. Recent Labs     09/29/22  1839   CKTOTAL 424*       Urinalysis:      Lab Results   Component Value Date/Time    NITRU Negative 09/29/2022 11:24 PM    WBCUA 1 09/29/2022 11:24 PM    BACTERIA None Seen 09/29/2022 11:24 PM    RBCUA 1 09/29/2022 11:24 PM    BLOODU Negative 09/29/2022 11:24 PM    SPECGRAV 1.085 09/29/2022 11:24 PM    GLUCOSEU Negative 09/29/2022 11:24 PM       Radiology:         CT HEAD WO CONTRAST   Final Result   1. No acute intracranial bleed.    2. Multifocal age indeterminate lacunar strokes involve right centrum   semiovale, bilateral basal ganglia and left caudate lobe. 3.  White matter hypoattenuation described is typical of microvascular   ischemic disease or as sequela of dysmyelinating/demyelinating processes. 4.  Vascular calcifications are noted reflecting calcific atherosclerosis. CT CHEST PULMONARY EMBOLISM W CONTRAST   Preliminary Result   No evidence of pulmonary embolism or acute pulmonary abnormality. Mild left   lower lobe atelectatic changes. XR CHEST PORTABLE   Final Result   No radiographic evidence of acute cardiopulmonary disease. Consults:    IP CONSULT TO NEPHROLOGY    ASSESSMENT:    Acute metabolic encephalopathy -due to hypernatremia and GARRETT  Hypernatremia  Acute kidney injury  Severe dehydration  Hypokalemia  History of ischemic stroke  Developmental delay  History of seizure disorder  History of sickle cell disease    PLAN:    Admit to [address] [place] [address] nephrology  Patient was given 2 L IV normal saline bolus in the ED  Start D5 infusion at 100 mL per hour to replace free water  She will need additional isotonic solution as well to address her dehydration such as lactated Ringer infusion or normal saline  Replace potassium via IV access  Repeat BMP every 6 hours and adjust IV fluid and electrolyte as indicated  Check magnesium and phosphorus and repeat CK level  I have low suspicious for infectious process as she is afebrile and WBC is normal.  Chest x-ray without pulmonary infiltrates  Reach out to her nursing home to obtain her medical records including her medication list which is currently not available    DVT Prophylaxis: Lovenox  Diet: ADULT DIET; Regular  Code Status: Full Code    PT/OT Eval Status: PT/OT consult is ordered for discharge planning    Dispo - admit as inpatient       Flavio Park MD    Thank you Felicia Han MD for the opportunity to be involved in this patient's care.  If you have any questions or concerns please feel free to contact me at [phone].

## 2022-09-30 NOTE — PROGRESS NOTES
Pharmacy Medication Reconciliation Note     List of medications patient is currently taking is complete. Source of information:   1. F med list      Notes regarding home medications:   1. No meds listed on admission  2. All meds on med list now added by pharmacy today.    3. MD will be notified      Dhiraj Martinez, [facility], [address] 9/30/2022 8:02 AM

## 2022-09-30 NOTE — PROGRESS NOTES
Pt incontinent of urine. Bladder scan read at 65 ml. Carmen care provided.  Electronically signed by Bethany Stock RN on 9/30/2022 at 12:21 PM

## 2022-09-30 NOTE — PROGRESS NOTES
Pt admitted to [facility] from ED. Vitals obtained on arrival and stable. Head-to-Toe assessment completed. Pt A/O x1 and non verbal. Admission assessment unable to complete. Pt is currently resting in bed at the lowest level. Bed alarm activated and call light in reach.

## 2022-09-30 NOTE — PROGRESS NOTES
Hospitalist Progress Note      PCP: Nicole Dockery MD    Date of Admission: 9/29/2022    Chief Complaint: altered mental status    Hospital Course: Warden Capps is 62 y.o. female with history of developmental delay, seizure disorder, stroke, sickle cell disease. Per report, at baseline patient is verbally interactive but since yesterday patient has not been talking  She is brought to the ED from her nursing home for altered mental status  On interview, she does not say a word although she is awake. She is unable to answer any questions to me. She can make eye contact and withdraws from  Work-up reveals severe hypernatremia sodium 159, hypokalemia potassium 2.8 and GARRETT which suggest severe dehydration. Subjective: Pt seen and examined. Lethargic, not interactive.        Medications:  Reviewed    Infusion Medications    sodium chloride      dextrose 100 mL/hr at 09/30/22 1225     Scheduled Medications    sodium chloride flush  5-40 mL IntraVENous 2 times per day    enoxaparin  30 mg SubCUTAneous Daily    mirtazapine  7.5 mg Oral Daily    verapamil  120 mg Oral Nightly    vitamin B-1  100 mg Oral Daily    vitamin B-12  1,000 mcg Oral Daily    Vitamin D  1,000 Units Oral Daily    clopidogrel  75 mg Oral Daily    [START ON 10/1/2022] ferrous sulfate  324 mg Oral Every Other Day    atorvastatin  80 mg Oral Nightly    aspirin  81 mg Oral Daily    melatonin  6 mg Oral Nightly    multivitamin  1 tablet Oral Daily    levETIRAcetam  500 mg IntraVENous Q12H     PRN Meds: sodium chloride flush, sodium chloride, ondansetron **OR** ondansetron, polyethylene glycol, acetaminophen **OR** acetaminophen, potassium chloride **OR** potassium alternative oral replacement **OR** potassium chloride      Intake/Output Summary (Last 24 hours) at 9/30/2022 1308  Last data filed at 9/29/2022 2208  Gross per 24 hour   Intake 1000 ml   Output --   Net 1000 ml       Exam:    BP (!) 137/91   Pulse (!) 103   Temp 98 °F (36.7 °C) (Axillary)   Resp 16   Ht 5' (1.524 m)   Wt 131 lb 2.8 oz (59.5 kg)   SpO2 93%   BMI 25.62 kg/m²     General appearance: Lethargic, chronically ill appearing. Drooling. No apparent distress, appears stated age and cooperative. HEENT: Pupils equal, round, and reactive to light. Conjunctivae/corneas clear. Neck: Supple, with full range of motion. No jugular venous distention. Trachea midline. Respiratory:  Normal respiratory effort. Clear to auscultation, bilaterally without Rales/Wheezes/Rhonchi. Cardiovascular: Regular rate and rhythm with normal S1/S2 without murmurs, rubs or gallops. Abdomen: Soft, non-tender, non-distended with normal bowel sounds. Musculoskeletal: No clubbing, cyanosis or edema bilaterally. Full range of motion without deformity. Skin: Skin color, texture, turgor normal.  No rashes or lesions. Neurologic:  Unable to cooperate with exam.  Psychiatric: Lethargic. Capillary Refill: Brisk,< 3 seconds   Peripheral Pulses: +2 palpable, equal bilaterally       Labs:   Recent Labs     09/29/22 1833 09/30/22  0844   WBC 10.4 12.2*   HGB 12.6 10.7*   HCT 40.1 34.4*    165     Recent Labs     09/29/22 1833 09/30/22  0844 09/30/22  1134   * 163* 159*  159*   K 2.8* 3.0* 3.1*  3.0*   * 123* 121*  122*   CO2 29 26 25  25   BUN 42* 37* 34*  34*   CREATININE 1.3* 0.8 0.7  0.7   CALCIUM 9.2 8.4 8.6  8.6   PHOS  --  2.8 2.4*     Recent Labs     09/30/22  1134   AST 40*   ALT 30   BILIDIR <0.2   BILITOT 0.4   ALKPHOS 67     No results for input(s): INR in the last 72 hours.   Recent Labs     09/29/22 1839 09/30/22  0844   CKTOTAL 424* 324*       Urinalysis:      Lab Results   Component Value Date/Time    NITRU Negative 09/29/2022 11:24 PM    WBCUA 1 09/29/2022 11:24 PM    BACTERIA None Seen 09/29/2022 11:24 PM    RBCUA 1 09/29/2022 11:24 PM    BLOODU Negative 09/29/2022 11:24 PM    SPECGRAV 1.085 09/29/2022 11:24 PM    GLUCOSEU Negative 09/29/2022 11:24 PM Radiology:  CT HEAD WO CONTRAST   Final Result   1. No acute intracranial bleed. 2. Multifocal age indeterminate lacunar strokes involve right centrum   semiovale, bilateral basal ganglia and left caudate lobe. 3.  White matter hypoattenuation described is typical of microvascular   ischemic disease or as sequela of dysmyelinating/demyelinating processes. 4.  Vascular calcifications are noted reflecting calcific atherosclerosis. CT CHEST PULMONARY EMBOLISM W CONTRAST   Final Result   No evidence of pulmonary embolism or acute pulmonary abnormality. Mild left   lower lobe atelectatic changes. XR CHEST PORTABLE   Final Result   No radiographic evidence of acute cardiopulmonary disease. Assessment/Plan:    Active Hospital Problems    Diagnosis Date Noted    Altered mental status [R41.82] 09/30/2022     Priority: Medium    Hypernatremia [E87.0] 09/30/2022     Priority: Medium    Hypokalemia [E87.6] 09/30/2022     Priority: Medium    GARRETT (acute kidney injury) ([facility] [address] [N17.9] 09/30/2022     Priority: Medium       Acute metabolic encephalopathy -   -continue D5W  -nephrology consulted, recs appreciated  -BMP q6h    Hypernatremia - suspect due to severe dehydration  -continue D5W  -nephrology consulted, recs appreciated  -BMP q6h    Hypokalemia  -replace PRN  -trend and monitor  -nephrology following    GARRETT - likely prerenal due to severe dehydration  -continue IVF  -nephrology managing fluids  -avoid nephrotoxic medications  -hold home ACEi  -trend BMP  -check urine studies    Hx CVA  -continue home meds    Hx seizure disorder  -change to IV Keppra and continue    Developmental delay  Sickle cell disease      DVT Prophylaxis: Lovenox  Diet: ADULT DIET;  Dysphagia - Minced and Moist  Code Status: Full Code    PT/OT Eval Status: ordered    Dispo - continue care    Amy Quezada MD

## 2022-09-30 NOTE — CONSULTS
Renal Cons dictated  . Pt seen and examined    Encephalopathy /unresponsive Etiology hypernatremia vs infection . Free water deficit   Hypokalemia Etiology unclear   Plan   Recheck  labs   Need to monitor Na rate of correction   Supp K. Check M . Check PVR   May need nurys Brandt MD,FACP .

## 2022-09-30 NOTE — PROGRESS NOTES
Physician Progress Note      Leslie Paulino  [facility] #:                  [id number]  :                       1965  ADMIT DATE:       2022 6:22 PM  [facility] DATE:  RESPONDING  PROVIDER #:        Tessa Hastings MD          QUERY TEXT:    Dear Dr. [facility],  Patient admitted with hypernatremia, dehydration, GARRETT, hypokalemia and acute   metabolic encephalopathy  and noted to have -125 and Leukocytosis . If   possible, please document in progress notes and discharge summary if you are   evaluating and/or treating any of the following: The medical record reflects the following:  Risk Factors: Hx CVA, Seizure disorder, Sickle cell disease, Current   hypernatremia, GARRETT, dehydration  Clinical Indicators:  ED from [facility] for AMS unresponsive x 9 hrs, IL=060,   Iz=265, creat=1.3, BUN=42,   Admitted for Acute metabolic encephalopathy -due   to hypernatremia and GARRETT,  and also Severe dehydration. Initial WBC is WNL and   on next check elevated =12.2 and FZ=742,  Treatment: 2L IV NS fluid bolus, Neph consult, IV D5 @ 100 ml/hr, additional   isotonic solution as well to address her dehydration such as lactated Ringer   infusion or normal saline, monitor labs  Thank you,  Tamra Colindres RN, [email or web address]. "[email or web address]  Options provided:  -- SIRS of non-infectious origin due to hypernatremia and dehydration with   associated acute organ dysfunction GARRETT and acute metabolic encephalopathy  -- Other - I will add my own diagnosis  -- Disagree - Not applicable / Not valid  -- Disagree - Clinically unable to determine / Unknown  -- Refer to Clinical Documentation Reviewer    PROVIDER RESPONSE TEXT:    This patient has SIRS of non-infectious origin due to hypernatremia and   dehydration with associated acute organ dysfunction GARRETT and acute metabolic   encephalopathy. Query created by:  [address], Mikel Nyhan on 2022 1:19 PM      Electronically signed by:  Tessa Hastings MD 2022 1:29 PM

## 2022-09-30 NOTE — PLAN OF CARE
Problem: Discharge Planning  Goal: Discharge to home or other facility with appropriate resources  9/30/2022 1415 by Bethany Stock RN  Outcome: Progressing  9/30/2022 0550 by Eric Adams RN  Outcome: Not Progressing     Problem: Pain  Goal: Verbalizes/displays adequate comfort level or baseline comfort level  9/30/2022 1415 by Bethany Stock RN  Outcome: Progressing  Flowsheets (Taken 9/30/2022 1415)  Verbalizes/displays adequate comfort level or baseline comfort level:   Encourage patient to monitor pain and request assistance   Assess pain using appropriate pain scale   Administer analgesics based on type and severity of pain and evaluate response   Implement non-pharmacological measures as appropriate and evaluate response  Note: Pt able to express presence and absence of pain using numerical pain scale. Pt pain is managed by PRN analgesics as ordered by MD. Pain reassess after each interventions. Will continue to monitor as needed. 9/30/2022 0550 by Eric Adams RN  Outcome: Not Progressing     Problem: Skin/Tissue Integrity  Goal: Absence of new skin breakdown  Description: 1. Monitor for areas of redness and/or skin breakdown  2. Assess vascular access sites hourly  3. Every 4-6 hours minimum:  Change oxygen saturation probe site  4. Every 4-6 hours:  If on nasal continuous positive airway pressure, respiratory therapy assess nares and determine need for appliance change or resting period. 9/30/2022 1415 by Bethany Stock RN  Outcome: Progressing  Note: Skin assessment performed each shift per protocol. Pt reposition every two hours and often. Carmen area kept clean and dry. Will continue to monitor and assess for skin breakdown.      9/30/2022 0550 by Eric Adams RN  Outcome: Not Progressing     Problem: Discharge Planning  Goal: Discharge to home or other facility with appropriate resources  9/30/2022 1415 by Bethany Stock RN  Outcome: Progressing  9/30/2022 0550 by Sofia Lozano RN  Outcome: Not Progressing     Problem: Pain  Goal: Verbalizes/displays adequate comfort level or baseline comfort level  9/30/2022 1415 by Campbell Nielson RN  Outcome: Progressing  Flowsheets (Taken 9/30/2022 1415)  Verbalizes/displays adequate comfort level or baseline comfort level:   Encourage patient to monitor pain and request assistance   Assess pain using appropriate pain scale   Administer analgesics based on type and severity of pain and evaluate response   Implement non-pharmacological measures as appropriate and evaluate response  Note: Pt able to express presence and absence of pain using numerical pain scale. Pt pain is managed by PRN analgesics as ordered by MD. Pain reassess after each interventions. Will continue to monitor as needed. 9/30/2022 0550 by Sofia Lozano RN  Outcome: Not Progressing     Problem: Skin/Tissue Integrity  Goal: Absence of new skin breakdown  Description: 1. Monitor for areas of redness and/or skin breakdown  2. Assess vascular access sites hourly  3. Every 4-6 hours minimum:  Change oxygen saturation probe site  4. Every 4-6 hours:  If on nasal continuous positive airway pressure, respiratory therapy assess nares and determine need for appliance change or resting period. 9/30/2022 1415 by Campbell Nielson RN  Outcome: Progressing  Note: Skin assessment performed each shift per protocol. Pt reposition every two hours and often. Carmen area kept clean and dry. Will continue to monitor and assess for skin breakdown.      9/30/2022 0550 by Sofia Lozano RN  Outcome: Not Progressing

## 2022-09-30 NOTE — PROGRESS NOTES
Pt sleeping in bed. Pt awaken to voice at times and goes back to sleep. Pt's respirations are clear and unlabored, SpO2 94% on room air. Pt not awake enough to take any PO meds at present time. Dr Laura Tinsley made aware and received new order for IV keppra. Pt resting in bed quietly. Bed alarm active. Call light and item need in reach.  Electronically signed by Marco Rodriguez RN on 9/30/2022 at 11:07 AM

## 2022-09-30 NOTE — CONSULTS
[address]           [address] [facility]                                  CONSULTATION    PATIENT NAME: Adalid Alvarez                 :        1965  MED REC NO:   [id number]                          ROOM:       26  ACCOUNT NO:   [de-identified]                           ADMIT DATE: 2022  PROVIDER:     Tracie Still MD    CONSULT DATE:  2022    REASON FOR CONSULTATION:  Hypernatremia with acute kidney injury. HISTORY OF PRESENT ILLNESS:  She is a 40-year-old female with past  medical history significant for seizure disorder, sickle cell disease,  CVA, developmental delay, who has been sent to ER because of declining  in her mentation. The patient was admitted for further workup and  management. At the time of presentation, she was found to have a sodium  of 159 with potassium of 2.2 and creatinine 1.3. Overnight, she has  been started on hypotonic IV fluids. At the time of consultation, the  patient was alert, awake and tried to speak and give answers, but HPI  and review of systems were very limited. No labs have been repeated  this morning. She looks comfortable. PAST MEDICAL HISTORY:  1.  Seizure disorder. 2.  CVA. 3.  Sickle cell disease. 4.  Developmental delay. PAST SURGICAL HISTORY:  None. FAMILY HISTORY:  Unobtainable. SOCIAL HISTORY:  Does not smoke or drink. MEDICATIONS:  Include Lipitor, aspirin, Plavix, ferrous sulfate, Keppra,  Remeron, vitamin supplement, Calan. ALLERGIES:  None. REVIEW OF SYSTEMS:  Unobtainable. PHYSICAL EXAMINATION:  GENERAL:  Lying in bed, looks comfortable. VITAL SIGNS:  Blood pressure 129/79, pulse 90, temperature 98. 3. HEENT:  Pupils reactive to light. CHEST:  Decreased breath sounds in both bases. CARDIOVASCULAR:  S1, S2 audible. Regular rate and rhythm. ABDOMEN:  Soft and nontender. Positive bowel sounds.   CNS:  Not able to examine. LABORATORY DATA:  Sodium 159, potassium 2.8, chloride 116, bicarb 29,  BUN 42, creatinine 1.3. WBC 12.2, hemoglobin 10.7. IMPRESSION:  1. Hypernatremia, free water deficit greater than 4 L.  2.  Severe hypokalemia, etiology unclear. 3.  Change in encephalopathy, most likely dehydration. 2.  Rule out infection. PLAN:  1. Daily weight. 2.  Strict I's and O's.  3.  Continue with hypotonic IV fluid. 4.  Check potassium, magnesium and sodium stat. 5.  Check serum and urine osmolality. 6.  Check the urine lytes. 7.  Followup on the serum sodium and potassium. 8.  We will follow the patient from Renal standpoint.         Jayne Shaikh MD    D: 09/30/2022 11:08:59       T: 09/30/2022 14:03:35     [email or web address]  Job#: [id number]     Doc#: [id number]    CC:

## 2022-09-30 NOTE — PROGRESS NOTES
Physical Therapy Attempt  Yoselyn Neville    Attempted to see pt for PT initial evaluation. Pt in bed, lethargic and does not wake to voice or sternal rub. Will re-attempt when pt is able to participate.     Electronically signed by Sissy Orlando PT on 9/30/2022 at 1:28 PM

## 2022-10-01 LAB
ANION GAP SERPL CALCULATED.3IONS-SCNC: 10 MMOL/L (ref 3–16)
BACTERIA: ABNORMAL /HPF
BILIRUBIN URINE: NEGATIVE
BLOOD, URINE: NEGATIVE
BUN BLDV-MCNC: 21 MG/DL (ref 7–20)
CALCIUM SERPL-MCNC: 8.4 MG/DL (ref 8.3–10.6)
CHLORIDE BLD-SCNC: 115 MMOL/L (ref 99–110)
CHLORIDE URINE RANDOM: 98 MMOL/L
CLARITY: ABNORMAL
CO2: 26 MMOL/L (ref 21–32)
COLOR: ABNORMAL
CREAT SERPL-MCNC: 0.6 MG/DL (ref 0.6–1.1)
CREATININE URINE: 219.9 MG/DL (ref 28–259)
EPITHELIAL CELLS, UA: 2 /HPF (ref 0–5)
GFR AFRICAN AMERICAN: >60
GFR NON-AFRICAN AMERICAN: >60
GLUCOSE BLD-MCNC: 89 MG/DL (ref 70–99)
GLUCOSE URINE: NEGATIVE MG/DL
HCT VFR BLD CALC: 31.6 % (ref 36–48)
HEMOGLOBIN: 10.1 G/DL (ref 12–16)
HYALINE CASTS: 1 /LPF (ref 0–8)
KETONES, URINE: NEGATIVE MG/DL
LEUKOCYTE ESTERASE, URINE: ABNORMAL
MCH RBC QN AUTO: 23.7 PG (ref 26–34)
MCHC RBC AUTO-ENTMCNC: 31.8 G/DL (ref 31–36)
MCV RBC AUTO: 74.5 FL (ref 80–100)
MICROALBUMIN UR-MCNC: <1.2 MG/DL
MICROALBUMIN/CREAT UR-RTO: NORMAL MG/G (ref 0–30)
MICROSCOPIC EXAMINATION: YES
NITRITE, URINE: NEGATIVE
OSMOLALITY URINE: 1062 MOSM/KG (ref 390–1070)
PDW BLD-RTO: 19.4 % (ref 12.4–15.4)
PH UA: 6 (ref 5–8)
PLATELET # BLD: 130 K/UL (ref 135–450)
PMV BLD AUTO: 11.4 FL (ref 5–10.5)
POTASSIUM SERPL-SCNC: 2.9 MMOL/L (ref 3.5–5.1)
POTASSIUM, UR: 57 MMOL/L
PROTEIN PROTEIN: 0.06 G/DL
PROTEIN PROTEIN: 62 MG/DL
PROTEIN UA: 30 MG/DL
RBC # BLD: 4.24 M/UL (ref 4–5.2)
RBC UA: 1 /HPF (ref 0–4)
SODIUM BLD-SCNC: 142 MMOL/L (ref 136–145)
SODIUM BLD-SCNC: 145 MMOL/L (ref 136–145)
SODIUM BLD-SCNC: 149 MMOL/L (ref 136–145)
SODIUM BLD-SCNC: 150 MMOL/L (ref 136–145)
SODIUM BLD-SCNC: 151 MMOL/L (ref 136–145)
SODIUM URINE: 45 MMOL/L
SPECIFIC GRAVITY UA: 1.04 (ref 1–1.03)
URINE TYPE: ABNORMAL
UROBILINOGEN, URINE: 1 E.U./DL
WBC # BLD: 7.3 K/UL (ref 4–11)
WBC UA: 15 /HPF (ref 0–5)

## 2022-10-01 PROCEDURE — 94760 N-INVAS EAR/PLS OXIMETRY 1: CPT

## 2022-10-01 PROCEDURE — 6360000002 HC RX W HCPCS: Performed by: INTERNAL MEDICINE

## 2022-10-01 PROCEDURE — 82043 UR ALBUMIN QUANTITATIVE: CPT

## 2022-10-01 PROCEDURE — 85027 COMPLETE CBC AUTOMATED: CPT

## 2022-10-01 PROCEDURE — 81001 URINALYSIS AUTO W/SCOPE: CPT

## 2022-10-01 PROCEDURE — 2580000003 HC RX 258: Performed by: INTERNAL MEDICINE

## 2022-10-01 PROCEDURE — 83935 ASSAY OF URINE OSMOLALITY: CPT

## 2022-10-01 PROCEDURE — 51798 US URINE CAPACITY MEASURE: CPT

## 2022-10-01 PROCEDURE — 84295 ASSAY OF SERUM SODIUM: CPT

## 2022-10-01 PROCEDURE — 84300 ASSAY OF URINE SODIUM: CPT

## 2022-10-01 PROCEDURE — 84133 ASSAY OF URINE POTASSIUM: CPT

## 2022-10-01 PROCEDURE — 84166 PROTEIN E-PHORESIS/URINE/CSF: CPT

## 2022-10-01 PROCEDURE — 82436 ASSAY OF URINE CHLORIDE: CPT

## 2022-10-01 PROCEDURE — 36415 COLL VENOUS BLD VENIPUNCTURE: CPT

## 2022-10-01 PROCEDURE — 1200000000 HC SEMI PRIVATE

## 2022-10-01 PROCEDURE — 82570 ASSAY OF URINE CREATININE: CPT

## 2022-10-01 PROCEDURE — 80048 BASIC METABOLIC PNL TOTAL CA: CPT

## 2022-10-01 PROCEDURE — 84156 ASSAY OF PROTEIN URINE: CPT

## 2022-10-01 RX ADMIN — LEVETIRACETAM 500 MG: 500 INJECTION, SOLUTION INTRAVENOUS at 12:13

## 2022-10-01 RX ADMIN — LEVETIRACETAM 500 MG: 500 INJECTION, SOLUTION INTRAVENOUS at 23:49

## 2022-10-01 RX ADMIN — POTASSIUM CHLORIDE 10 MEQ: 7.46 INJECTION, SOLUTION INTRAVENOUS at 15:12

## 2022-10-01 RX ADMIN — POTASSIUM CHLORIDE 10 MEQ: 7.46 INJECTION, SOLUTION INTRAVENOUS at 17:31

## 2022-10-01 RX ADMIN — POTASSIUM CHLORIDE 10 MEQ: 7.46 INJECTION, SOLUTION INTRAVENOUS at 12:40

## 2022-10-01 RX ADMIN — DEXTROSE MONOHYDRATE: 2.5 INJECTION INTRAVENOUS at 04:54

## 2022-10-01 RX ADMIN — DEXTROSE MONOHYDRATE: 2.5 INJECTION INTRAVENOUS at 17:31

## 2022-10-01 RX ADMIN — POTASSIUM CHLORIDE 10 MEQ: 7.46 INJECTION, SOLUTION INTRAVENOUS at 09:40

## 2022-10-01 RX ADMIN — POTASSIUM CHLORIDE 10 MEQ: 7.46 INJECTION, SOLUTION INTRAVENOUS at 16:30

## 2022-10-01 RX ADMIN — ENOXAPARIN SODIUM 30 MG: 100 INJECTION SUBCUTANEOUS at 09:42

## 2022-10-01 RX ADMIN — POTASSIUM CHLORIDE 10 MEQ: 7.46 INJECTION, SOLUTION INTRAVENOUS at 13:56

## 2022-10-01 NOTE — PROGRESS NOTES
Occupational Therapy  Attempt Note    Attempted to see pt for OT initial evaluation. Pt in bed, lethargic and does not wake to voice or sternal rub. Will re-attempt when pt is able to participate.     Electronically signed by PRIYANKA Connors/L [id number] on 10/1/2022 at 1:58 PM

## 2022-10-01 NOTE — PROGRESS NOTES
Hospitalist Progress Note      PCP: Leopold Coin, MD    Date of Admission: 9/29/2022    Chief Complaint: altered mental status    Hospital Course: Michael Lane is 62 y.o. female with history of developmental delay, seizure disorder, stroke, sickle cell disease. Per report, at baseline patient is verbally interactive but since yesterday patient has not been talking  She is brought to the ED from her nursing home for altered mental status  On interview, she does not say a word although she is awake. She is unable to answer any questions to me. She can make eye contact and withdraws from  Work-up reveals severe hypernatremia sodium 159, hypokalemia potassium 2.8 and GARRETT which suggest severe dehydration. Subjective: Pt seen and examined. Lethargic, not interactive.        Medications:  Reviewed    Infusion Medications    sodium chloride      dextrose 100 mL/hr at 10/01/22 0454     Scheduled Medications    sodium chloride flush  5-40 mL IntraVENous 2 times per day    enoxaparin  30 mg SubCUTAneous Daily    mirtazapine  7.5 mg Oral Daily    verapamil  120 mg Oral Nightly    vitamin B-1  100 mg Oral Daily    vitamin B-12  1,000 mcg Oral Daily    Vitamin D  1,000 Units Oral Daily    clopidogrel  75 mg Oral Daily    ferrous sulfate  324 mg Oral Every Other Day    atorvastatin  80 mg Oral Nightly    aspirin  81 mg Oral Daily    melatonin  6 mg Oral Nightly    multivitamin  1 tablet Oral Daily    levETIRAcetam  500 mg IntraVENous Q12H     PRN Meds: sodium chloride flush, sodium chloride, ondansetron **OR** ondansetron, polyethylene glycol, acetaminophen **OR** acetaminophen, potassium chloride **OR** potassium alternative oral replacement **OR** potassium chloride      Intake/Output Summary (Last 24 hours) at 10/1/2022 1012  Last data filed at 10/1/2022 0401  Gross per 24 hour   Intake 1300 ml   Output 100 ml   Net 1200 ml         Exam:    BP (!) 141/93   Pulse 88   Temp 98.1 °F (36.7 °C) (Axillary)   Resp 16   Ht 5' (1.524 m)   Wt 135 lb 12.9 oz (61.6 kg)   SpO2 96%   BMI 26.52 kg/m²     General appearance: Lethargic, chronically ill appearing. Drooling. No apparent distress, appears stated age and cooperative. HEENT: Pupils equal, round, and reactive to light. Conjunctivae/corneas clear. Neck: Supple, with full range of motion. No jugular venous distention. Trachea midline. Respiratory:  Normal respiratory effort. Clear to auscultation, bilaterally without Rales/Wheezes/Rhonchi. Cardiovascular: Regular rate and rhythm with normal S1/S2 without murmurs, rubs or gallops. Abdomen: Soft, non-tender, non-distended with normal bowel sounds. Musculoskeletal: No clubbing, cyanosis or edema bilaterally. Full range of motion without deformity. Skin: Skin color, texture, turgor normal.  No rashes or lesions. Neurologic:  Unable to cooperate with exam.  Psychiatric: Lethargic. Capillary Refill: Brisk,< 3 seconds   Peripheral Pulses: +2 palpable, equal bilaterally       Labs:   Recent Labs     09/29/22  1833 09/30/22  0844 10/01/22  0614   WBC 10.4 12.2* 7.3   HGB 12.6 10.7* 10.1*   HCT 40.1 34.4* 31.6*    165 130*       Recent Labs     09/30/22  0844 09/30/22  1134 09/30/22  1735 09/30/22  2242 10/01/22  0614   * 159*  159* 155* 152* 151*  150*   K 3.0* 3.1*  3.0* 3.2*  --  2.9*   * 121*  122* 119*  --  115*   CO2 26 25  25 29  --  26   BUN 37* 34*  34* 29*  --  21*   CREATININE 0.8 0.7  0.7 0.8  --  0.6   CALCIUM 8.4 8.6  8.6 8.4  --  8.4   PHOS 2.8 2.4*  --   --   --        Recent Labs     09/30/22  1134   AST 40*   ALT 30   BILIDIR <0.2   BILITOT 0.4   ALKPHOS 67       No results for input(s): INR in the last 72 hours.   Recent Labs     09/29/22  1839 09/30/22  0844   CKTOTAL 424* 324*         Urinalysis:      Lab Results   Component Value Date/Time    NITRU Negative 10/01/2022 04:05 AM    WBCUA 15 10/01/2022 04:05 AM    BACTERIA 1+ 10/01/2022 04:05 AM    RBCUA 1 10/01/2022 04:05 AM    BLOODU Negative 10/01/2022 04:05 AM    SPECGRAV 1.037 10/01/2022 04:05 AM    GLUCOSEU Negative 10/01/2022 04:05 AM       Radiology:  CT HEAD WO CONTRAST   Final Result   1. No acute intracranial bleed. 2. Multifocal age indeterminate lacunar strokes involve right centrum   semiovale, bilateral basal ganglia and left caudate lobe. 3.  White matter hypoattenuation described is typical of microvascular   ischemic disease or as sequela of dysmyelinating/demyelinating processes. 4.  Vascular calcifications are noted reflecting calcific atherosclerosis. CT CHEST PULMONARY EMBOLISM W CONTRAST   Final Result   No evidence of pulmonary embolism or acute pulmonary abnormality. Mild left   lower lobe atelectatic changes. XR CHEST PORTABLE   Final Result   No radiographic evidence of acute cardiopulmonary disease. Assessment/Plan:    Active Hospital Problems    Diagnosis Date Noted    Altered mental status [R41.82] 09/30/2022     Priority: Medium    Hypernatremia [E87.0] 09/30/2022     Priority: Medium    Hypokalemia [E87.6] 09/30/2022     Priority: Medium    GARRETT (acute kidney injury) ([facility] [address] [N17.9] 09/30/2022     Priority: Medium       Acute metabolic encephalopathy -   -continue D5W  -nephrology consulted, recs appreciated  -BMP q6h    Hypernatremia - suspect due to severe dehydration, improving  -continue D5W  -nephrology consulted, recs appreciated  -BMP q6h    Hypokalemia  -replace PRN  -trend and monitor  -nephrology following    GARRETT - likely prerenal due to severe dehydration, improved  -continue IVF  -nephrology managing fluids  -avoid nephrotoxic medications  -hold home ACEi  -trend BMP  -check urine studies    Hx CVA  -continue home meds    Hx seizure disorder  -change to IV Keppra and continue    Developmental delay  Sickle cell disease      DVT Prophylaxis: Lovenox  Diet: ADULT DIET;  Dysphagia - Minced and Moist  Code Status: Full Code    PT/OT Eval Status: ordered    Dispo - continue care    Tian Bingham MD

## 2022-10-01 NOTE — PROGRESS NOTES
Nephrology ([facility]) Progress Note    CC: hypernatremia and hypokalemia    Subjective:    HPI:  Breathing comfortably. MRDD. Serum sodium improved. Potassium remains low. ROS:  In bed. No fever. [address]:  medications reviewed. Objective:  Blood pressure (!) 141/93, pulse 88, temperature 98.1 °F (36.7 °C), temperature source Axillary, resp. rate 16, height 5' (1.524 m), weight 135 lb 12.9 oz (61.6 kg), SpO2 97 %. Intake/Output Summary (Last 24 hours) at 10/1/2022 1525  Last data filed at 10/1/2022 1307  Gross per 24 hour   Intake 1300 ml   Output 100 ml   Net 1200 ml     General:  NAD, lethargic  Chest:  CTAB  CVS:  RRR  Abdominal:  NTND, soft, +BS  Extremities:  no edema  Skin:  no rash    Labs:  Renal panel:  Lab Results   Component Value Date/Time     (H) 10/01/2022 11:09 AM    K 2.9 (LL) 10/01/2022 06:14 AM    CO2 26 10/01/2022 06:14 AM    BUN 21 (H) 10/01/2022 06:14 AM    CREATININE 0.6 10/01/2022 06:14 AM    CALCIUM 8.4 10/01/2022 06:14 AM    PHOS 2.4 (L) 09/30/2022 11:34 AM    MG 2.10 09/30/2022 11:34 AM     CBC:  Lab Results   Component Value Date/Time    WBC 7.3 10/01/2022 06:14 AM    HGB 10.1 (L) 10/01/2022 06:14 AM    HCT 31.6 (L) 10/01/2022 06:14 AM     (L) 10/01/2022 06:14 AM       Assessment/Plan:  Reviewed old records and labs. 1) hypernatremia   - not sure what would have led to this situation   - free water deficit 1.8 liters/day   - continue D5W at 100 ml/hr until tomorrow    2) hypokalemia   - likely will be difficult to correct while patient is on IVF   - supplement prn   - magnesium level okay    3) AMS   - possibly due to hypernatremia, but it should have improved given improvement in Na, and she should not be symptomatic at this level   - neurology consult?     4) FEN   - repeat phos    5) MRDD

## 2022-10-01 NOTE — PROGRESS NOTES
Pt sleeping in bed with eyes closed. Pt response to pain at times. Pt's respirations are easy and unlabored on room air. IVF-infusing as ordered. Bed alarm active. Call light in reach.  Electronically signed by Jamar Segura RN on 10/1/2022 at 1:14 PM

## 2022-10-01 NOTE — PROGRESS NOTES
Physical Therapy  Yoselyn Neville  Received orders for PT eval and treat. Attempted this PM.  Pt lethargic. Unable to wake her with sternal rub. Will attempt again when pt able to participate.     Electronically signed by Sandip Monahan, [place] [id number] on 10/1/2022 at 1:58 PM

## 2022-10-01 NOTE — PLAN OF CARE
Problem: Discharge Planning  Goal: Discharge to home or other facility with appropriate resources  Outcome: Progressing     Problem: Pain  Goal: Verbalizes/displays adequate comfort level or baseline comfort level  Outcome: Progressing  Flowsheets (Taken 10/1/2022 1311)  Verbalizes/displays adequate comfort level or baseline comfort level:   Encourage patient to monitor pain and request assistance   Assess pain using appropriate pain scale   Administer analgesics based on type and severity of pain and evaluate response   Implement non-pharmacological measures as appropriate and evaluate response     Problem: Skin/Tissue Integrity  Goal: Absence of new skin breakdown  Description: 1. Monitor for areas of redness and/or skin breakdown  2. Assess vascular access sites hourly  3. Every 4-6 hours minimum:  Change oxygen saturation probe site  4. Every 4-6 hours:  If on nasal continuous positive airway pressure, respiratory therapy assess nares and determine need for appliance change or resting period. Outcome: Progressing  Note: Skin assessment performed each shift per protocol. Pt encouraged to reposition often. Will continue to monitor and assess for skin breakdown. Problem: Safety - Adult  Goal: Free from fall injury  Outcome: Progressing  Flowsheets (Taken 10/1/2022 1311)  Free From Fall Injury:   Instruct family/caregiver on patient safety   Based on caregiver fall risk screen, instruct family/caregiver to ask for assistance with transferring infant if caregiver noted to have fall risk factors  Note: Pt free from falls this shift. Non skid socks provided. Bed alarm active. Call light always within reach.       Problem: ABCDS Injury Assessment  Goal: Absence of physical injury  Outcome: Progressing  Flowsheets (Taken 10/1/2022 1311)  Absence of Physical Injury: Implement safety measures based on patient assessment

## 2022-10-02 LAB
ANION GAP SERPL CALCULATED.3IONS-SCNC: 13 MMOL/L (ref 3–16)
BUN BLDV-MCNC: 7 MG/DL (ref 7–20)
CALCIUM SERPL-MCNC: 8.6 MG/DL (ref 8.3–10.6)
CHLORIDE BLD-SCNC: 105 MMOL/L (ref 99–110)
CO2: 23 MMOL/L (ref 21–32)
CREAT SERPL-MCNC: 0.5 MG/DL (ref 0.6–1.1)
FOLATE: 5.1 NG/ML (ref 4.78–24.2)
GFR AFRICAN AMERICAN: >60
GFR NON-AFRICAN AMERICAN: >60
GLUCOSE BLD-MCNC: 116 MG/DL (ref 70–99)
HCT VFR BLD CALC: 37.7 % (ref 36–48)
HEMOGLOBIN: 12 G/DL (ref 12–16)
MCH RBC QN AUTO: 23.7 PG (ref 26–34)
MCHC RBC AUTO-ENTMCNC: 31.9 G/DL (ref 31–36)
MCV RBC AUTO: 74.2 FL (ref 80–100)
PDW BLD-RTO: 18.7 % (ref 12.4–15.4)
PHOSPHORUS: 2.1 MG/DL (ref 2.5–4.9)
PLATELET # BLD: 167 K/UL (ref 135–450)
PLATELET SLIDE REVIEW: ADEQUATE
PMV BLD AUTO: 11.3 FL (ref 5–10.5)
POTASSIUM SERPL-SCNC: 2.9 MMOL/L (ref 3.5–5.1)
RBC # BLD: 5.08 M/UL (ref 4–5.2)
SLIDE REVIEW: ABNORMAL
SODIUM BLD-SCNC: 133 MMOL/L (ref 136–145)
SODIUM BLD-SCNC: 141 MMOL/L (ref 136–145)
SODIUM BLD-SCNC: 141 MMOL/L (ref 136–145)
TSH REFLEX FT4: 3.14 UIU/ML (ref 0.27–4.2)
VITAMIN B-12: 1724 PG/ML (ref 211–911)
VITAMIN D 25-HYDROXY: 31.7 NG/ML
WBC # BLD: 9 K/UL (ref 4–11)

## 2022-10-02 PROCEDURE — 6360000002 HC RX W HCPCS: Performed by: INTERNAL MEDICINE

## 2022-10-02 PROCEDURE — 85027 COMPLETE CBC AUTOMATED: CPT

## 2022-10-02 PROCEDURE — 80048 BASIC METABOLIC PNL TOTAL CA: CPT

## 2022-10-02 PROCEDURE — 6370000000 HC RX 637 (ALT 250 FOR IP): Performed by: INTERNAL MEDICINE

## 2022-10-02 PROCEDURE — 84295 ASSAY OF SERUM SODIUM: CPT

## 2022-10-02 PROCEDURE — 87086 URINE CULTURE/COLONY COUNT: CPT

## 2022-10-02 PROCEDURE — 94760 N-INVAS EAR/PLS OXIMETRY 1: CPT

## 2022-10-02 PROCEDURE — 2580000003 HC RX 258: Performed by: INTERNAL MEDICINE

## 2022-10-02 PROCEDURE — 1200000000 HC SEMI PRIVATE

## 2022-10-02 PROCEDURE — 82306 VITAMIN D 25 HYDROXY: CPT

## 2022-10-02 PROCEDURE — 2500000003 HC RX 250 WO HCPCS: Performed by: INTERNAL MEDICINE

## 2022-10-02 PROCEDURE — 84100 ASSAY OF PHOSPHORUS: CPT

## 2022-10-02 PROCEDURE — 36415 COLL VENOUS BLD VENIPUNCTURE: CPT

## 2022-10-02 PROCEDURE — 82607 VITAMIN B-12: CPT

## 2022-10-02 PROCEDURE — 82746 ASSAY OF FOLIC ACID SERUM: CPT

## 2022-10-02 PROCEDURE — 84443 ASSAY THYROID STIM HORMONE: CPT

## 2022-10-02 PROCEDURE — 84425 ASSAY OF VITAMIN B-1: CPT

## 2022-10-02 RX ADMIN — LEVETIRACETAM 500 MG: 500 INJECTION, SOLUTION INTRAVENOUS at 23:16

## 2022-10-02 RX ADMIN — POTASSIUM PHOSPHATE, MONOBASIC AND POTASSIUM PHOSPHATE, DIBASIC 20 MMOL: 224; 236 INJECTION, SOLUTION, CONCENTRATE INTRAVENOUS at 16:59

## 2022-10-02 RX ADMIN — ENOXAPARIN SODIUM 30 MG: 100 INJECTION SUBCUTANEOUS at 09:51

## 2022-10-02 RX ADMIN — DEXTROSE MONOHYDRATE: 2.5 INJECTION INTRAVENOUS at 21:59

## 2022-10-02 RX ADMIN — POTASSIUM CHLORIDE 10 MEQ: 7.46 INJECTION, SOLUTION INTRAVENOUS at 12:46

## 2022-10-02 RX ADMIN — ACETAMINOPHEN 650 MG: 650 SUPPOSITORY RECTAL at 23:49

## 2022-10-02 RX ADMIN — DEXTROSE MONOHYDRATE: 2.5 INJECTION INTRAVENOUS at 03:50

## 2022-10-02 RX ADMIN — POTASSIUM CHLORIDE 10 MEQ: 7.46 INJECTION, SOLUTION INTRAVENOUS at 14:28

## 2022-10-02 RX ADMIN — CEFTRIAXONE 1000 MG: 1 INJECTION, POWDER, FOR SOLUTION INTRAMUSCULAR; INTRAVENOUS at 09:51

## 2022-10-02 RX ADMIN — POTASSIUM CHLORIDE 10 MEQ: 7.46 INJECTION, SOLUTION INTRAVENOUS at 15:39

## 2022-10-02 RX ADMIN — LEVETIRACETAM 500 MG: 500 INJECTION, SOLUTION INTRAVENOUS at 10:43

## 2022-10-02 RX ADMIN — POTASSIUM CHLORIDE 10 MEQ: 7.46 INJECTION, SOLUTION INTRAVENOUS at 11:23

## 2022-10-02 RX ADMIN — POTASSIUM CHLORIDE 10 MEQ: 7.46 INJECTION, SOLUTION INTRAVENOUS at 20:36

## 2022-10-02 RX ADMIN — POTASSIUM CHLORIDE 10 MEQ: 7.46 INJECTION, SOLUTION INTRAVENOUS at 21:57

## 2022-10-02 NOTE — PROGRESS NOTES
Hospitalist Progress Note      PCP: Christiana Gutiérrez MD    Date of Admission: 9/29/2022    Chief Complaint: altered mental status    Hospital Course: Juana Tirado is 62 y.o. female with history of developmental delay, seizure disorder, stroke, sickle cell disease. Per report, at baseline patient is verbally interactive but since yesterday patient has not been talking  She is brought to the ED from her nursing home for altered mental status  On interview, she does not say a word although she is awake. She is unable to answer any questions to me. She can make eye contact and withdraws from  Work-up reveals severe hypernatremia sodium 159, hypokalemia potassium 2.8 and GARRETT which suggest severe dehydration. Subjective: Pt seen and examined. Awakens to voice/name, but then doesn't answer any questions or follow any commands.       Medications:  Reviewed    Infusion Medications    sodium chloride      dextrose 100 mL/hr at 10/02/22 0350     Scheduled Medications    cefTRIAXone (ROCEPHIN) IV  1,000 mg IntraVENous Q24H    sodium chloride flush  5-40 mL IntraVENous 2 times per day    enoxaparin  30 mg SubCUTAneous Daily    mirtazapine  7.5 mg Oral Daily    verapamil  120 mg Oral Nightly    vitamin B-1  100 mg Oral Daily    vitamin B-12  1,000 mcg Oral Daily    Vitamin D  1,000 Units Oral Daily    clopidogrel  75 mg Oral Daily    ferrous sulfate  324 mg Oral Every Other Day    atorvastatin  80 mg Oral Nightly    aspirin  81 mg Oral Daily    melatonin  6 mg Oral Nightly    multivitamin  1 tablet Oral Daily    levETIRAcetam  500 mg IntraVENous Q12H     PRN Meds: sodium chloride flush, sodium chloride, ondansetron **OR** ondansetron, polyethylene glycol, acetaminophen **OR** acetaminophen, potassium chloride **OR** potassium alternative oral replacement **OR** potassium chloride      Intake/Output Summary (Last 24 hours) at 10/2/2022 0917  Last data filed at 10/1/2022 2030  Gross per 24 hour   Intake 900 q6h    Hypokalemia  -replace PRN  -trend and monitor  -nephrology following    GARRETT - likely prerenal due to severe dehydration, improved  -continue IVF  -nephrology managing fluids  -avoid nephrotoxic medications  -hold home ACEi  -trend BMP  -check urine studies    Hx CVA  -continue home meds    Hx seizure disorder  -change to IV Keppra and continue    Developmental delay  Sickle cell disease      DVT Prophylaxis: Lovenox  Diet: Diet NPO  Code Status: Full Code    PT/OT Eval Status: ordered    Dispo - continue care    Veronica Carlos MD

## 2022-10-02 NOTE — PROGRESS NOTES
Carmen care provide and pt repositioned in bed. Pt started to cry. Pt is awake , unable to answer any questions. Pt able to move her left hand . Pt resting in bed quietly. Bed alarm on. Call light and item need in reach.  Electronically signed by Laurent Coronado RN on 10/2/2022 at 12:48 PM

## 2022-10-02 NOTE — PLAN OF CARE
Problem: Discharge Planning  Goal: Discharge to home or other facility with appropriate resources  10/2/2022 0157 by Marlo Graves RN  Outcome: Progressing     Problem: Pain  Goal: Verbalizes/displays adequate comfort level or baseline comfort level  10/2/2022 0157 by Marlo Graves RN  Outcome: Not Progressing     Problem: Skin/Tissue Integrity  Goal: Absence of new skin breakdown  Description: 1. Monitor for areas of redness and/or skin breakdown  2. Assess vascular access sites hourly  3. Every 4-6 hours minimum:  Change oxygen saturation probe site  4. Every 4-6 hours:  If on nasal continuous positive airway pressure, respiratory therapy assess nares and determine need for appliance change or resting period.   10/2/2022 0157 by Marlo Graves RN  Outcome: Progressing     Problem: Safety - Adult  Goal: Free from fall injury  10/2/2022 0157 by Marlo rGaves RN  Outcome: Progressing     Problem: ABCDS Injury Assessment  Goal: Absence of physical injury  10/2/2022 0157 by Marlo Graves RN  Outcome: Progressing     Problem: Pain  Goal: Verbalizes/displays adequate comfort level or baseline comfort level  10/2/2022 0157 by Marlo Graves RN  Outcome: Not Progressing  10/1/2022 1311 by Shelly Whitt RN  Outcome: Progressing  Flowsheets (Taken 10/1/2022 1311)  Verbalizes/displays adequate comfort level or baseline comfort level:   Encourage patient to monitor pain and request assistance   Assess pain using appropriate pain scale   Administer analgesics based on type and severity of pain and evaluate response   Implement non-pharmacological measures as appropriate and evaluate response

## 2022-10-02 NOTE — PROGRESS NOTES
New cardiac monitor applied on pt as ordered by MD. Electronically signed by Nolan Miguel RN on 10/2/2022 at 5:03 PM

## 2022-10-02 NOTE — CONSULTS
Neurology Consult Note  Reason for Consult: altered mental status  Referring Provider: Lala Ang MD asked me to see Regi Elliott in consultation. History of Present Illness:  Regi Elliott is a 62 y.o. female who presents with altered mental status. Patient has h/o developmental delay and lives in a nursing home. Also h/o seizures, prior stroke. During this admission found to have GARRETT and hypernatremia. She has had prior labs with hypernatremia--highest in September 163 on 9/30/2022. Patient not able to provide history. Per RN--admitted from NH due to 9hrs of unresponsiveness at the [facility]. She has intermittently being opening eyes but other times hard to get her awake with voice or sternal rub. Moving left arm when attempt for needle stick/IV  PER RN when she spoke with NH staff--baseline--she would speak some but not lots of sentences. Alert also at bedside.  Wheelchair bound    Medical History: developmental delay, hypertension, stroke, high cholesterol, sickle cell disease, seizures    Medications Prior to Admission: aspirin 81 MG chewable tablet, Take 81 mg by mouth daily  atorvastatin (LIPITOR) 80 MG tablet, Take 80 mg by mouth nightly  vitamin D (CHOLECALCIFEROL) 25 MCG (1000 UT) TABS tablet, Take 1,000 Units by mouth daily  clopidogrel (PLAVIX) 75 MG tablet, Take 75 mg by mouth daily  vitamin B-12 (CYANOCOBALAMIN) 1000 MCG tablet, Take 1,000 mcg by mouth daily  ferrous sulfate (IRON 325) 325 (65 Fe) MG tablet, Take 325 mg by mouth every other day  levETIRAcetam (KEPPRA) 500 MG tablet, Take 500 mg by mouth 2 times daily  lisinopril (PRINIVIL;ZESTRIL) 40 MG tablet, Take 40 mg by mouth daily  melatonin 3 MG TABS tablet, Take 6 mg by mouth at bedtime  Multiple Vitamin (MULTIVITAMIN ADULT) TABS, Take 1 tablet by mouth daily  mirtazapine (REMERON) 7.5 MG tablet, Take 7.5 mg by mouth daily For appetite  vitamin B-1 (THIAMINE) 100 MG tablet, Take 100 mg by mouth daily  verapamil (CALAN) 120 MG tablet, Take 120 mg by mouth at bedtime  No Known Allergies  History reviewed. No pertinent family history. Social History     Tobacco Use   Smoking Status Not on file   Smokeless Tobacco Not on file     Social History     Substance and Sexual Activity   Drug Use Not on file     Social History     Substance and Sexual Activity   Alcohol Use None       ROS: PATIENT NOT ABLE DUE TO MENTAL STATE--NONVERBAL      Exam:  Blood pressure 101/76, pulse (!) 118, temperature 98.5 °F (36.9 °C), temperature source Axillary, resp. rate 18, height 5' (1.524 m), weight 132 lb 11.5 oz (60.2 kg), SpO2 96 %. Constitutional   Vital signs: BP, HR, and RR reviewed   General somnolent--difficult to arouse but did eventually to deep sternal rub--then maintained eye opening well-nourished  Cardiovascular: pulses symmetric in all 4 extremities. No peripheral edema. RRR, no murmur  Psychiatric: not cooperative with examination, no  psychotic behavior noted. Neurologic  Mental status:   Nonverbal, not following commands. Keeps eyes open once I woke her up  Cranial nerves:   CN2: Visual Fields --blinks to threat but not all the time, stares   CN 3,4,6: limited as not following commands,  CN5: facial sensation--limited not following commands--grimaces to stimulation   CN7:face--without gross weakness but not following commands   CN8: not able to test  CN9: palate --not able to test due to not following commands  CN11: trap full strength on shoulder shrug  CN12: tongue midline with protrusion  Strength: no meaningful withdrawal to noxious stimulation in all 4 extremities   Deep tendon reflexes: absent in all 4 extremities. Negative Babinski  Sensory: grimaces to stimulation of the LEFT ARM AND LEG BUT NOT ON THE RIGHT.   Cerebellar/coordination: NOT ABLE  Tone: normal in all 4 extremities  Gait: noT ABLE    Labs:   CBC with Differential:    Lab Results   Component Value Date/Time    WBC 9.0 10/02/2022 08:21 AM    RBC 5.08 10/02/2022 08:21 AM    HGB 12.0 10/02/2022 08:21 AM    HCT 37.7 10/02/2022 08:21 AM     10/01/2022 06:14 AM    MCV 74.2 10/02/2022 08:21 AM    MCH 23.7 10/02/2022 08:21 AM    MCHC 31.9 10/02/2022 08:21 AM    RDW 18.7 10/02/2022 08:21 AM    LYMPHOPCT 12.5 09/29/2022 06:33 PM    MONOPCT 7.7 09/29/2022 06:33 PM    BASOPCT 0.9 09/29/2022 06:33 PM    MONOSABS 0.8 09/29/2022 06:33 PM    LYMPHSABS 1.3 09/29/2022 06:33 PM    EOSABS 0.0 09/29/2022 06:33 PM    BASOSABS 0.1 09/29/2022 06:33 PM     CMP:    Lab Results   Component Value Date/Time     10/02/2022 08:21 AM     10/02/2022 08:21 AM    K 2.9 10/02/2022 08:21 AM     10/02/2022 08:21 AM    CO2 23 10/02/2022 08:21 AM    BUN 7 10/02/2022 08:21 AM    CREATININE 0.5 10/02/2022 08:21 AM    GFRAA >60 10/02/2022 08:21 AM    LABGLOM >60 10/02/2022 08:21 AM    GLUCOSE 116 10/02/2022 08:21 AM    PROT 6.4 09/30/2022 11:34 AM    LABALBU 3.1 09/30/2022 11:34 AM    CALCIUM 8.6 10/02/2022 08:21 AM    BILITOT 0.4 09/30/2022 11:34 AM    ALKPHOS 67 09/30/2022 11:34 AM    AST 40 09/30/2022 11:34 AM    ALT 30 09/30/2022 11:34 AM     Last 3 Troponin:  No results found for: TROPONINI  U/A:    Lab Results   Component Value Date/Time    COLORU DARK YELLOW 10/01/2022 04:05 AM    PROTEINU 30 10/01/2022 04:05 AM    PHUR 6.0 10/01/2022 04:05 AM    WBCUA 15 10/01/2022 04:05 AM    RBCUA 1 10/01/2022 04:05 AM    BACTERIA 1+ 10/01/2022 04:05 AM    CLARITYU TURBID 10/01/2022 04:05 AM    SPECGRAV 1.037 10/01/2022 04:05 AM    LEUKOCYTESUR SMALL 10/01/2022 04:05 AM    UROBILINOGEN 1.0 10/01/2022 04:05 AM    BILIRUBINUR Negative 10/01/2022 04:05 AM    BLOODU Negative 10/01/2022 04:05 AM    GLUCOSEU Negative 10/01/2022 04:05 AM     ABG:  No results found for: PH, PCO2, PO2, HCO3, BE, THGB, TCO2, O2SAT  HgBA1c:  No results found for: LABA1C  TSH:  No results found for: TSH  VITAMIN B12: No components found for: B12  FOLATE:  No results found for: FOLATE  Urine Toxicology:  No components found for: IAMMENTA, IBARBIT, IBENZO, ICOCAINE, IMARTHC, IOPIATES, IPHENCYC      Studies---    CT HEAD WO CONTRAST--9/29/2022 8:06 pm    SOFT TISSUES/SKULL: No acute abnormality of the visualized skull or soft  tissues. Vascular calcifications are noted reflecting calcific  atherosclerosis. Sequela from remote right frontal craniotomy. Impression  1. No acute intracranial bleed. 2. Multifocal age indeterminate lacunar strokes involve right centrum  semiovale, bilateral basal ganglia and left caudate lobe. 3.  White matter hypoattenuation described is typical of microvascular  ischemic disease or as sequela of dysmyelinating/demyelinating processes. 4.  Vascular calcifications are noted reflecting calcific atherosclerosis. Impression:  Belinda Harris is a 62 y.o. female who presented with [address]. PRIOR STROKE AND ALSO LISTED historoy of seizures. aLSO with hyponatremia on presentation. Hyponatremia correcting but so far no improvement in mental status. Prior notes mentioned left side weakness with prior stroke but now seems that there is decrease sensation on the right. She has stroke risk factors and so need to rule out another stroke. D/w RN    Recommendations: MRI BRAIN WO--will order  MRA HEAD AND NECK--will order  EEG--will order  Will also order labs--B12, TSH, B1, AMMONIA    Thank you for allowing me to participate in the care of your patient with high medical copmlexity requiring a high level of medical decision making. If I can be of further assitance, please do not hesitate to call. A copy of this note was provided for Serenity Chavez MD.     Electronically signed by Osvaldo Wright MD on 10/2/2022 at 9:52 AM     [phone]  Evenings, weekends, and off weeks please discuss neurologist on-call.

## 2022-10-02 NOTE — PROGRESS NOTES
Nephrology ([facility]) Progress Note    CC: hypernatremia and hypokalemia    Subjective:    HPI:  Breathing comfortably. MRDD. Serum sodium corrected. Potassium remains low. ROS:  In bed. No fever. [address]:  medications reviewed. Objective:  Blood pressure 101/76, pulse (!) 118, temperature 98.5 °F (36.9 °C), temperature source Axillary, resp. rate 18, height 5' (1.524 m), weight 132 lb 11.5 oz (60.2 kg), SpO2 96 %. Intake/Output Summary (Last 24 hours) at 10/2/2022 1155  Last data filed at 10/2/2022 0951  Gross per 24 hour   Intake 2705 ml   Output 570 ml   Net 2135 ml       General:  NAD, awake but non-verbal  Chest:  CTAB  CVS:  RRR  Abdominal:  NTND, soft, +BS  Extremities:  no edema  Skin:  no rash    Labs:  Renal panel:  Lab Results   Component Value Date/Time     10/02/2022 08:21 AM     10/02/2022 08:21 AM    K 2.9 (LL) 10/02/2022 08:21 AM    CO2 23 10/02/2022 08:21 AM    BUN 7 10/02/2022 08:21 AM    CREATININE 0.5 (L) 10/02/2022 08:21 AM    CALCIUM 8.6 10/02/2022 08:21 AM    PHOS 2.1 (L) 10/02/2022 08:21 AM    MG 2.10 09/30/2022 11:34 AM     CBC:  Lab Results   Component Value Date/Time    WBC 9.0 10/02/2022 08:21 AM    HGB 12.0 10/02/2022 08:21 AM    HCT 37.7 10/02/2022 08:21 AM     10/02/2022 08:21 AM       Assessment/Plan:  Reviewed old records and labs.     1) hypernatremia   - not sure what would have led to this situation   - free water deficit 1.8 liters/day   - decreased D5W to 50 ml/hr as she remains encephalopathic    2) hypokalemia   - likely will be difficult to correct while patient is on IVF   - supplement prn   - magnesium level okay    3) AMS   - possibly due to hypernatremia, but it should have improved given improvement in Na, and she should not be symptomatic at this level   - neurology consulted   - MRI and MRA pending (patient can receive gadolinium)    4) FEN   - hypophosphatemia    - supplement    5) MRDD

## 2022-10-02 NOTE — PLAN OF CARE
Problem: Discharge Planning  Goal: Discharge to home or other facility with appropriate resources  Outcome: Progressing  Flowsheets (Taken 10/2/2022 1615)  Discharge to home or other facility with appropriate resources:   Identify barriers to discharge with patient and caregiver   Arrange for needed discharge resources and transportation as appropriate     Problem: Pain  Goal: Verbalizes/displays adequate comfort level or baseline comfort level  Outcome: Progressing  Flowsheets (Taken 10/2/2022 1615)  Verbalizes/displays adequate comfort level or baseline comfort level:   Encourage patient to monitor pain and request assistance   Assess pain using appropriate pain scale   Administer analgesics based on type and severity of pain and evaluate response   Implement non-pharmacological measures as appropriate and evaluate response     Problem: Skin/Tissue Integrity  Goal: Absence of new skin breakdown  Description: 1. Monitor for areas of redness and/or skin breakdown  2. Assess vascular access sites hourly  3. Every 4-6 hours minimum:  Change oxygen saturation probe site  4. Every 4-6 hours:  If on nasal continuous positive airway pressure, respiratory therapy assess nares and determine need for appliance change or resting period. Outcome: Progressing  Note: Skin assessment performed each shift per protocol. Pt repositioned every two hours and often. Protective mepilex applied on pt's sacrum . Will continue to monitor and assess for skin breakdown. Problem: Safety - Adult  Goal: Free from fall injury  Outcome: Progressing  Flowsheets (Taken 10/2/2022 1615)  Free From Fall Injury: Instruct family/caregiver on patient safety  Note: Pt free from falls this shift. Non skid socks provided. Pt educated on use of call light as needed for assistance. Bed alarm on. Call light always within reach.       Problem: ABCDS Injury Assessment  Goal: Absence of physical injury  Outcome: Progressing  Flowsheets (Taken 10/2/2022 1615)  Absence of Physical Injury: Implement safety measures based on patient assessment

## 2022-10-03 ENCOUNTER — APPOINTMENT (OUTPATIENT)
Dept: MRI IMAGING | Age: 57
DRG: 426 | End: 2022-10-03
Payer: MEDICAID

## 2022-10-03 ENCOUNTER — APPOINTMENT (OUTPATIENT)
Dept: GENERAL RADIOLOGY | Age: 57
DRG: 426 | End: 2022-10-03
Payer: MEDICAID

## 2022-10-03 LAB
ANION GAP SERPL CALCULATED.3IONS-SCNC: 12 MMOL/L (ref 3–16)
BUN BLDV-MCNC: 5 MG/DL (ref 7–20)
CALCIUM SERPL-MCNC: 8.2 MG/DL (ref 8.3–10.6)
CHLORIDE BLD-SCNC: 105 MMOL/L (ref 99–110)
CO2: 22 MMOL/L (ref 21–32)
CREAT SERPL-MCNC: <0.5 MG/DL (ref 0.6–1.1)
GFR AFRICAN AMERICAN: >60
GFR NON-AFRICAN AMERICAN: >60
GLUCOSE BLD-MCNC: 102 MG/DL (ref 70–99)
PHOSPHORUS: 2.9 MG/DL (ref 2.5–4.9)
PHOSPHORUS: 3.3 MG/DL (ref 2.5–4.9)
POTASSIUM SERPL-SCNC: 3.1 MMOL/L (ref 3.5–5.1)
POTASSIUM SERPL-SCNC: 4.2 MMOL/L (ref 3.5–5.1)
PROCALCITONIN: 0.09 NG/ML (ref 0–0.15)
REPORT: NORMAL
RESPIRATORY PANEL PCR: NORMAL
SODIUM BLD-SCNC: 139 MMOL/L (ref 136–145)
SODIUM BLD-SCNC: 139 MMOL/L (ref 136–145)
SODIUM BLD-SCNC: 140 MMOL/L (ref 136–145)
SODIUM BLD-SCNC: 140 MMOL/L (ref 136–145)
URINE CULTURE, ROUTINE: NORMAL

## 2022-10-03 PROCEDURE — 1200000000 HC SEMI PRIVATE

## 2022-10-03 PROCEDURE — 4A00X4Z MEASUREMENT OF CENTRAL NERVOUS ELECTRICAL ACTIVITY, EXTERNAL APPROACH: ICD-10-PCS | Performed by: PSYCHIATRY & NEUROLOGY

## 2022-10-03 PROCEDURE — 95819 EEG AWAKE AND ASLEEP: CPT

## 2022-10-03 PROCEDURE — 6360000002 HC RX W HCPCS: Performed by: INTERNAL MEDICINE

## 2022-10-03 PROCEDURE — 84145 PROCALCITONIN (PCT): CPT

## 2022-10-03 PROCEDURE — 70544 MR ANGIOGRAPHY HEAD W/O DYE: CPT

## 2022-10-03 PROCEDURE — 0202U NFCT DS 22 TRGT SARS-COV-2: CPT

## 2022-10-03 PROCEDURE — 70551 MRI BRAIN STEM W/O DYE: CPT

## 2022-10-03 PROCEDURE — 36415 COLL VENOUS BLD VENIPUNCTURE: CPT

## 2022-10-03 PROCEDURE — 84132 ASSAY OF SERUM POTASSIUM: CPT

## 2022-10-03 PROCEDURE — 51798 US URINE CAPACITY MEASURE: CPT

## 2022-10-03 PROCEDURE — 9990000010 HC NO CHARGE VISIT

## 2022-10-03 PROCEDURE — 84100 ASSAY OF PHOSPHORUS: CPT

## 2022-10-03 PROCEDURE — 74018 RADEX ABDOMEN 1 VIEW: CPT

## 2022-10-03 PROCEDURE — 80048 BASIC METABOLIC PNL TOTAL CA: CPT

## 2022-10-03 PROCEDURE — 84295 ASSAY OF SERUM SODIUM: CPT

## 2022-10-03 PROCEDURE — 2580000003 HC RX 258: Performed by: INTERNAL MEDICINE

## 2022-10-03 PROCEDURE — 70547 MR ANGIOGRAPHY NECK W/O DYE: CPT

## 2022-10-03 RX ORDER — POTASSIUM CHLORIDE 7.45 MG/ML
10 INJECTION INTRAVENOUS
Status: DISPENSED | OUTPATIENT
Start: 2022-10-03 | End: 2022-10-03

## 2022-10-03 RX ADMIN — POTASSIUM CHLORIDE 10 MEQ: 7.46 INJECTION, SOLUTION INTRAVENOUS at 16:36

## 2022-10-03 RX ADMIN — POTASSIUM CHLORIDE 10 MEQ: 7.46 INJECTION, SOLUTION INTRAVENOUS at 17:50

## 2022-10-03 RX ADMIN — SODIUM CHLORIDE, PRESERVATIVE FREE 10 ML: 5 INJECTION INTRAVENOUS at 20:17

## 2022-10-03 RX ADMIN — SODIUM CHLORIDE, PRESERVATIVE FREE 10 ML: 5 INJECTION INTRAVENOUS at 12:10

## 2022-10-03 RX ADMIN — POTASSIUM CHLORIDE 10 MEQ: 7.46 INJECTION, SOLUTION INTRAVENOUS at 12:58

## 2022-10-03 RX ADMIN — LEVETIRACETAM 500 MG: 500 INJECTION, SOLUTION INTRAVENOUS at 12:09

## 2022-10-03 RX ADMIN — LEVETIRACETAM 500 MG: 500 INJECTION, SOLUTION INTRAVENOUS at 23:24

## 2022-10-03 RX ADMIN — POTASSIUM CHLORIDE 10 MEQ: 7.46 INJECTION, SOLUTION INTRAVENOUS at 18:46

## 2022-10-03 NOTE — PROGRESS NOTES
Hospitalist Progress Note      PCP: Leopold Coin, MD    Date of Admission: 9/29/2022    Chief Complaint: altered mental status    Hospital Course: Michael Lane is 62 y.o. female with history of developmental delay, seizure disorder, stroke, sickle cell disease. Per report, at baseline patient is verbally interactive but since yesterday patient has not been talking  She is brought to the ED from her nursing home for altered mental status  On interview, she does not say a word although she is awake. She is unable to answer any questions to me. She can make eye contact and withdraws from  Work-up reveals severe hypernatremia sodium 159, hypokalemia potassium 2.8 and GARRETT which suggest severe dehydration. Subjective: Pt seen and examined. Awakens to voice/name, but then doesn't answer any questions or follow any commands. Clinically mostly unchanged since admission. [place] a temperature to 101.1F overnight.       Medications:  Reviewed    Infusion Medications    sodium chloride      dextrose 50 mL/hr at 10/02/22 5974     Scheduled Medications    cefTRIAXone (ROCEPHIN) IV  1,000 mg IntraVENous Q24H    sodium chloride flush  5-40 mL IntraVENous 2 times per day    enoxaparin  30 mg SubCUTAneous Daily    mirtazapine  7.5 mg Oral Daily    verapamil  120 mg Oral Nightly    vitamin B-1  100 mg Oral Daily    vitamin B-12  1,000 mcg Oral Daily    Vitamin D  1,000 Units Oral Daily    clopidogrel  75 mg Oral Daily    ferrous sulfate  324 mg Oral Every Other Day    atorvastatin  80 mg Oral Nightly    aspirin  81 mg Oral Daily    melatonin  6 mg Oral Nightly    multivitamin  1 tablet Oral Daily    levETIRAcetam  500 mg IntraVENous Q12H     PRN Meds: sodium chloride flush, sodium chloride, ondansetron **OR** ondansetron, polyethylene glycol, acetaminophen **OR** acetaminophen, potassium chloride **OR** potassium alternative oral replacement **OR** potassium chloride      Intake/Output Summary (Last 24 hours) at 10/3/2022 0957  Last data filed at 10/3/2022 4351  Gross per 24 hour   Intake 945 ml   Output 650 ml   Net 295 ml         Exam:    BP (!) 155/92   Pulse (!) 115   Temp 97.5 °F (36.4 °C) (Axillary)   Resp 18   Ht 5' (1.524 m)   Wt 139 lb 8.8 oz (63.3 kg)   SpO2 100%   BMI 27.25 kg/m²     General appearance: Lethargic, chronically ill appearing. No apparent distress, appears stated age and cooperative. HEENT: Pupils equal, round, and reactive to light. Conjunctivae/corneas clear. Neck: Supple, with full range of motion. No jugular venous distention. Trachea midline. Respiratory:  Normal respiratory effort. Clear to auscultation, bilaterally without Rales/Wheezes/Rhonchi. Cardiovascular: Regular rate and rhythm with normal S1/S2 without murmurs, rubs or gallops. Abdomen: Soft, non-tender, non-distended with normal bowel sounds. Musculoskeletal: No clubbing, cyanosis or edema bilaterally. Full range of motion without deformity. Skin: Skin color, texture, turgor normal.  No rashes or lesions. Neurologic:  Unable to cooperate with exam.  Psychiatric: Lethargic. Capillary Refill: Brisk,< 3 seconds   Peripheral Pulses: +2 palpable, equal bilaterally       Labs:   Recent Labs     10/01/22  0614 10/02/22  0821   WBC 7.3 9.0   HGB 10.1* 12.0   HCT 31.6* 37.7   * 167       Recent Labs     10/01/22  0614 10/01/22  1109 10/02/22  0821 10/02/22  1653 10/03/22  0010 10/03/22  0011 10/03/22  0754   *  150*   < > 141  141 133* 140  --  139  139   K 2.9*  --  2.9*  --   --  4.2 3.1*   *  --  105  --   --   --  105   CO2 26  --  23  --   --   --  22   BUN 21*  --  7  --   --   --  5*   CREATININE 0.6  --  0.5*  --   --   --  <0.5*   CALCIUM 8.4  --  8.6  --   --   --  8.2*   PHOS  --   --  2.1*  --   --  3.3 2.9    < > = values in this interval not displayed.        Recent Labs     09/30/22  1134   AST 40*   ALT 30   BILIDIR <0.2   BILITOT 0.4   ALKPHOS 67       No results for input(s): INR in including COVID  -MRI Brain without contrast pending  -MRA Head and Neck with contrast pending  -EEG pending  -TSH WNL  -vitamin B1 pending  -vitamin B12 WNL    Pyuria - UTI ruled out after further study  -stop ceftriaxone  -urine culture showed mixed jovon only    Hypernatremia - suspect due to severe dehydration, improving  -continue D5W  -nephrology consulted, recs appreciated  -BMP q6h    Hypokalemia  -replace PRN  -trend and monitor  -nephrology following    GARRETT - likely prerenal due to severe dehydration, improved  -continue IVF  -nephrology managing fluids  -avoid nephrotoxic medications  -hold home ACEi  -trend BMP  -checked urine studies    Hx CVA  -continue home meds including DAPT and high intensity statin    Hx seizure disorder  -change to IV Keppra and continue    Developmental delay  Sickle cell disease      DVT Prophylaxis: Lovenox  Diet: Diet NPO  Code Status: Full Code    PT/OT Eval Status: ordered    Dispo - continue care    Giles Coreas MD

## 2022-10-03 NOTE — PROGRESS NOTES
Occupational Therapy Attempt/Discharge  Yoselyn Neville    OT order noted. Patient attempted past several days with no success due to lethargy. Please re-order OT when mentation is appropriate to mobilize. OT signing off.     Electronically signed by Jack Kumar OT on 10/3/22 at 11:58 AM EDT

## 2022-10-03 NOTE — PLAN OF CARE
Problem: Discharge Planning  Goal: Discharge to home or other facility with appropriate resources  10/3/2022 1056 by Cherise Brooke RN  Outcome: Progressing  Flowsheets (Taken 10/3/2022 1014)  Discharge to home or other facility with appropriate resources: Identify barriers to discharge with patient and caregiver     Problem: Pain  Goal: Verbalizes/displays adequate comfort level or baseline comfort level  10/3/2022 1056 by Cherise Brooke RN  Outcome: Progressing     Problem: Pain  Goal: Verbalizes/displays adequate comfort level or baseline comfort level  10/3/2022 1056 by Cherise Brooke RN  Outcome: Progressing  10/3/2022 0052 by Nohelia Shipley RN  Outcome: Not Progressing   Pain /discomfort being managed with PRN analgesics per MD orders. Patient able to express presence and absence of pain and rate pain appropriately using adult nonverbal  Problem: Skin/Tissue Integrity  Goal: Absence of new skin breakdown  Description: 1. Monitor for areas of redness and/or skin breakdown  2. Assess vascular access sites hourly  3. Every 4-6 hours minimum:  Change oxygen saturation probe site  4. Every 4-6 hours:  If on nasal continuous positive airway pressure, respiratory therapy assess nares and determine need for appliance change or resting period. 10/3/2022 1056 by Cherise Brooke RN  Outcome: Progressing     Problem: Safety - Adult  Goal: Free from fall injury  10/3/2022 1056 by Cherise Brooke RN  Outcome: Progressing  Flowsheets (Taken 10/3/2022 1014)  Free From Fall Injury: Instruct family/caregiver on patient safety     Problem: ABCDS Injury Assessment  Goal: Absence of physical injury  10/3/2022 1056 by Cherise Brooke RN  Outcome: Progressing  Flowsheets (Taken 10/3/2022 1014)  Absence of Physical Injury: Implement safety measures based on patient assessment    scale.

## 2022-10-03 NOTE — PROCEDURES
[address] [place] [address] Calvin Kaur [address]                          ELECTROENCEPHALOGRAM REPORT    PATIENT NAME: Mahesh Medrano                 :        1965  MED REC NO:   [id number]                          ROOM:       4260  ACCOUNT NO:   [de-identified]                           ADMIT DATE: 2022  PROVIDER:     Mary Connors DO    DATE OF EEG:  10/03/2022    REFERRING PROVIDER:  Min Manjarrez MD    REASON FOR STUDY:  Altered mental status. BRIEF HISTORY AND NEUROLOGIC FINDINGS:  The patient is a 28-year-old  female being evaluated for altered mental status and history of  epilepsy. MEDICATIONS:  The patient's centrally-acting medications listed include  Keppra and Remeron. EEG FINDINGS:  This is a 20-channel digital EEG performed utilizing  bipolar and referential montages. Wakefulness, drowsiness and sleep  were obtained during the recording. During maximum wakefulness, there  was a moderate voltage, symmetric, somewhat disorganized at times though  reactive 7-8 Hz posterior background rhythm. The anterior background  consists of low-to-moderate voltage mixed frequencies. Drowsiness is  manifested by attenuation of the waking background rhythms. Sleep was  manifested by somewhat poorly formed K-complexes. Occasional sharply contoured theta discharges were noted in the left  temporal region. Photic stimulation was performed at various flash frequencies and did  not evoke any significant posterior driving response. Hyperventilation  exercise was not performed due to the patient's age and/or clinical  history. A 1-channel EKG rhythm strip was reviewed and showed no obvious cardiac  dysrhythmias.     EEG DIAGNOSIS:  This EEG is abnormal due to the presence of occasional  sharp waves in the left temporal region, which appear most likely  consistent with focal epileptiform discharges as well as mild background  slowing and disorganization. CLINICAL INTERPRETATION:  The focal sharp waves in the left temporal  region did appear epileptiform in nature and suggests underlying focal  epilepsy. The background slowing and disorganization is nonspecific and  consistent with a mild encephalopathy. This may be seen in multiple  settings including toxic, metabolic or degenerative conditions as well  as with medication effect. No seizures were recorded. Clinical  correlation is advised.         Tameka Yarbrough DO    D: 10/03/2022 18:26:27       T: 10/03/2022 18:28:55     [id number]  Job#: [id number]     Doc#: [id number]    CC:

## 2022-10-03 NOTE — CARE COORDINATION
Talked to Uziel Littlejohn at [facility], emergency contact infor received. Called aunt, Italia Churchill; pt's mother is still alive but she has dementia so Kenneth Richards is the next living kin who is capable of making decisions for Yoselyn. She is agreeable to Ariana Snare returning to [facility]. Uziel Littlejohn updated.     Natanael Wilson RN, BSN,   [phone]  Electronically signed by Natanael Wilson RN on 10/3/2022 at 2:20 PM

## 2022-10-03 NOTE — PLAN OF CARE
Problem: Pain  Goal: Verbalizes/displays adequate comfort level or baseline comfort level  10/3/2022 0052 by José Miguel Nielson RN  Outcome: Not Progressing    Problem: Pain  Goal: Verbalizes/displays adequate comfort level or baseline comfort level  10/3/2022 0052 by José Miguel Nielson RN  Outcome: Not Progressing     Problem: Skin/Tissue Integrity  Goal: Absence of new skin breakdown  Description: 1. Monitor for areas of redness and/or skin breakdown  2. Assess vascular access sites hourly  3. Every 4-6 hours minimum:  Change oxygen saturation probe site  4. Every 4-6 hours:  If on nasal continuous positive airway pressure, respiratory therapy assess nares and determine need for appliance change or resting period.   10/3/2022 0052 by José Miguel Nielson RN  Outcome: Progressing     Problem: Safety - Adult  Goal: Free from fall injury  10/3/2022 0052 by José Miguel Nielson RN  Outcome: Progressing     Problem: ABCDS Injury Assessment  Goal: Absence of physical injury  10/3/2022 0052 by José Miguel Nielson RN  Outcome: Progressing

## 2022-10-03 NOTE — PROGRESS NOTES
Physical Therapy  Initial Evaluation Attempt and Discharge    10/03/22    Name: Hortencia Stephens   : 1965    MRN: [id number]    PT order noted. Patient attempted past several days with no success due to lethargy. Please re-order PT when mentation is appropriate to mobilize. PT signing off.     Electronically signed by Carmen Motta PT on 10/3/2022 at 11:38 AM

## 2022-10-03 NOTE — PROGRESS NOTES
Nephrology ([facility]) Progress Note    CC: hypernatremia and hypokalemia    Subjective:    HPI:  Breathing comfortably. MRDD. Labs noted . Potassium remains low. ROS:  In bed. No fever. [address]:  medications reviewed. Objective:  Blood pressure 122/88, pulse (!) 103, temperature 97.5 °F (36.4 °C), temperature source Axillary, resp. rate 18, height 5' (1.524 m), weight 139 lb 8.8 oz (63.3 kg), SpO2 94 %. Intake/Output Summary (Last 24 hours) at 10/3/2022 1207  Last data filed at 10/3/2022 9753  Gross per 24 hour   Intake 945 ml   Output 650 ml   Net 295 ml       General:  resting   Chest:  CTAB  CVS:  RRR  Abdominal:  NTND, soft, +BS  Extremities:  no edema  Skin:  no rash    Labs:  Renal panel:  Lab Results   Component Value Date/Time     10/03/2022 07:54 AM     10/03/2022 07:54 AM    K 3.1 (L) 10/03/2022 07:54 AM    CO2 22 10/03/2022 07:54 AM    BUN 5 (L) 10/03/2022 07:54 AM    CREATININE <0.5 (L) 10/03/2022 07:54 AM    CALCIUM 8.2 (L) 10/03/2022 07:54 AM    PHOS 2.9 10/03/2022 07:54 AM    MG 2.10 09/30/2022 11:34 AM     CBC:  Lab Results   Component Value Date/Time    WBC 9.0 10/02/2022 08:21 AM    HGB 12.0 10/02/2022 08:21 AM    HCT 37.7 10/02/2022 08:21 AM     10/02/2022 08:21 AM       Assessment/Plan:  Reviewed old records and labs.     1) hypernatremia   -Na better    - free water deficit resolving .         - On  D5W to 50 ml/hr    2) hypokalemia   - supplement prn   - magnesium level okay    3) AMS   - possibly due to hypernatremia, but it should have improved given improvement in Na, and she should not be symptomatic at this level   - neurology consulted   - MRI and MRA pending (patient can receive gadolinium)    4) FEN   - hypophosphatemia    - supplement    5) MRDD        Pete Chang MD,FACP

## 2022-10-04 LAB
ALBUMIN SERPL-MCNC: 2.8 G/DL (ref 3.4–5)
ANION GAP SERPL CALCULATED.3IONS-SCNC: 11 MMOL/L (ref 3–16)
BUN BLDV-MCNC: 4 MG/DL (ref 7–20)
CALCIUM SERPL-MCNC: 8.5 MG/DL (ref 8.3–10.6)
CHLORIDE BLD-SCNC: 109 MMOL/L (ref 99–110)
CO2: 22 MMOL/L (ref 21–32)
CREAT SERPL-MCNC: <0.5 MG/DL (ref 0.6–1.1)
GFR AFRICAN AMERICAN: >60
GFR NON-AFRICAN AMERICAN: >60
GLUCOSE BLD-MCNC: 101 MG/DL (ref 70–99)
GLUCOSE BLD-MCNC: 99 MG/DL (ref 70–99)
MAGNESIUM: 1.7 MG/DL (ref 1.8–2.4)
PERFORMED ON: NORMAL
PHOSPHORUS: 2.4 MG/DL (ref 2.5–4.9)
POTASSIUM SERPL-SCNC: 3.6 MMOL/L (ref 3.5–5.1)
PRO-BNP: 4509 PG/ML (ref 0–124)
SODIUM BLD-SCNC: 142 MMOL/L (ref 136–145)

## 2022-10-04 PROCEDURE — 6360000002 HC RX W HCPCS: Performed by: INTERNAL MEDICINE

## 2022-10-04 PROCEDURE — 1200000000 HC SEMI PRIVATE

## 2022-10-04 PROCEDURE — 94760 N-INVAS EAR/PLS OXIMETRY 1: CPT

## 2022-10-04 PROCEDURE — 2580000003 HC RX 258: Performed by: INTERNAL MEDICINE

## 2022-10-04 PROCEDURE — 83880 ASSAY OF NATRIURETIC PEPTIDE: CPT

## 2022-10-04 PROCEDURE — 51798 US URINE CAPACITY MEASURE: CPT

## 2022-10-04 PROCEDURE — 80069 RENAL FUNCTION PANEL: CPT

## 2022-10-04 PROCEDURE — 36415 COLL VENOUS BLD VENIPUNCTURE: CPT

## 2022-10-04 PROCEDURE — 83735 ASSAY OF MAGNESIUM: CPT

## 2022-10-04 RX ORDER — POTASSIUM CHLORIDE 7.45 MG/ML
10 INJECTION INTRAVENOUS
Status: COMPLETED | OUTPATIENT
Start: 2022-10-04 | End: 2022-10-04

## 2022-10-04 RX ORDER — MAGNESIUM SULFATE IN WATER 40 MG/ML
2000 INJECTION, SOLUTION INTRAVENOUS ONCE
Status: COMPLETED | OUTPATIENT
Start: 2022-10-04 | End: 2022-10-04

## 2022-10-04 RX ADMIN — MAGNESIUM SULFATE HEPTAHYDRATE 2000 MG: 40 INJECTION, SOLUTION INTRAVENOUS at 16:43

## 2022-10-04 RX ADMIN — LEVETIRACETAM 500 MG: 500 INJECTION, SOLUTION INTRAVENOUS at 11:33

## 2022-10-04 RX ADMIN — LEVETIRACETAM 500 MG: 500 INJECTION, SOLUTION INTRAVENOUS at 23:40

## 2022-10-04 RX ADMIN — DEXTROSE MONOHYDRATE: 2.5 INJECTION INTRAVENOUS at 03:05

## 2022-10-04 RX ADMIN — DEXTROSE MONOHYDRATE: 2.5 INJECTION INTRAVENOUS at 20:16

## 2022-10-04 RX ADMIN — POTASSIUM CHLORIDE 10 MEQ: 7.46 INJECTION, SOLUTION INTRAVENOUS at 14:17

## 2022-10-04 RX ADMIN — ENOXAPARIN SODIUM 30 MG: 100 INJECTION SUBCUTANEOUS at 09:04

## 2022-10-04 RX ADMIN — POTASSIUM CHLORIDE 10 MEQ: 7.46 INJECTION, SOLUTION INTRAVENOUS at 13:37

## 2022-10-04 NOTE — PLAN OF CARE
Problem: Discharge Planning  Goal: Discharge to home or other facility with appropriate resources  10/4/2022 0843 by Meg Lott RN  Outcome: Not Progressing  10/4/2022 0618 by Aleksandra Kirkpatrick RN  Outcome: Progressing     Problem: Pain  Goal: Verbalizes/displays adequate comfort level or baseline comfort level  10/4/2022 0843 by Meg Lott RN  Outcome: Not Progressing  10/4/2022 0618 by Aleksandra Kirkpatrick RN  Outcome: Progressing     Problem: Skin/Tissue Integrity  Goal: Absence of new skin breakdown  Description: 1. Monitor for areas of redness and/or skin breakdown  2. Assess vascular access sites hourly  3. Every 4-6 hours minimum:  Change oxygen saturation probe site  4. Every 4-6 hours:  If on nasal continuous positive airway pressure, respiratory therapy assess nares and determine need for appliance change or resting period.   10/4/2022 0843 by Meg Lott RN  Outcome: Progressing  10/4/2022 0618 by Aleksandra Kirkpatrick RN  Outcome: Progressing     Problem: Safety - Adult  Goal: Free from fall injury  10/4/2022 0843 by Meg Lott RN  Outcome: Progressing  10/4/2022 0618 by Aleksandra Kirkpatrick RN  Outcome: Progressing     Problem: ABCDS Injury Assessment  Goal: Absence of physical injury  10/4/2022 0843 by Meg Lott RN  Outcome: Progressing  10/4/2022 0618 by Aleksandra Kirkpatrick RN  Outcome: Progressing     Problem: Neurosensory - Adult  Goal: Achieves stable or improved neurological status  10/4/2022 0843 by Meg Lott RN  Outcome: Not progressing  10/4/2022 0618 by Aleksandra Kirkpatrick RN  Outcome: Progressing  Goal: Absence of seizures  10/4/2022 0843 by Meg Lott RN  Outcome: Progressing  10/4/2022 0618 by Aleksandra Kirkpatrick RN  Outcome: Progressing  Goal: Remains free of injury related to seizures activity  10/4/2022 0843 by Meg Lott RN  Outcome: Progressing  10/4/2022 0618 by Aleksandra Kirkpatrick RN  Outcome: Progressing  Goal: Achieves maximal functionality and self care  10/4/2022 0843 by Lynn Euceda RN  Outcome: Not progressing  10/4/2022 0618 by Tristan Gonzalez RN  Outcome: Progressing     Problem: Musculoskeletal - Adult  Goal: Return mobility to safest level of function  10/4/2022 0843 by Lynn Euceda RN  Outcome: Not Progressing  10/4/2022 0618 by Tristan Gonzalez RN  Outcome: Progressing  Goal: Maintain proper alignment of affected body part  10/4/2022 0843 by Lynn Euceda RN  Outcome: Progressing  10/4/2022 0618 by Tristan Gonzalez RN  Outcome: Progressing  Goal: Return ADL status to a safe level of function  10/4/2022 0843 by Lynn Euceda RN  Outcome: Not Progressing  10/4/2022 0618 by Tristan Gonzalez RN  Outcome: Progressing     Problem: Genitourinary - Adult  Goal: Absence of urinary retention  10/4/2022 0843 by Lynn Euceda RN  Outcome: Progressing  10/4/2022 0618 by Tristan Gonzalez RN  Outcome: Progressing  Goal: Urinary catheter remains patent  10/4/2022 0843 by Lynn Euceda RN  Outcome: Progressing  10/4/2022 0618 by Tristan Gonzalez RN  Outcome: Progressing     Problem: Metabolic/Fluid and Electrolytes - Adult  Goal: Electrolytes maintained within normal limits  10/4/2022 0843 by Lynn Euceda RN  Outcome: Progressing  10/4/2022 0618 by Tristan Gonzalez RN  Outcome: Progressing  Goal: Hemodynamic stability and optimal renal function maintained  10/4/2022 0843 by Lynn Euceda RN  Outcome: Progressing  10/4/2022 0618 by Tristan Gonzalez RN  Outcome: Progressing  Goal: Glucose maintained within prescribed range  10/4/2022 0843 by Lynn Euceda RN  Outcome: Progressing  10/4/2022 0618 by Tristan Gonzalez RN  Outcome: Progressing     Problem: Respiratory - Adult  Goal: Achieves optimal ventilation and oxygenation  10/4/2022 0843 by Lynn Euceda RN  Outcome: Progressing  10/4/2022 0618 by Tabitha Ch Nato Moya RN  Outcome: Progressing     Problem: Cardiovascular - Adult  Goal: Maintains optimal cardiac output and hemodynamic stability  10/4/2022 0843 by Alysa Gabriel RN  Outcome: Progressing  10/4/2022 0618 by Rosa Santoro RN  Outcome: Progressing  Goal: Absence of cardiac dysrhythmias or at baseline  10/4/2022 0843 by Alysa Gabriel RN  Outcome: Progressing  10/4/2022 0618 by Rosa Santoro RN  Outcome: Progressing     Problem: Gastrointestinal - Adult  Goal: Maintains or returns to baseline bowel function  10/4/2022 0843 by Alysa Gabriel RN  Outcome: Progressing  10/4/2022 0618 by Rosa Santoro RN  Outcome: Progressing  Goal: Maintains adequate nutritional intake  10/4/2022 0843 by Alysa Gabriel RN  Outcome: Not Progressing  10/4/2022 0618 by Rosa Santoro RN  Outcome: Progressing

## 2022-10-04 NOTE — PLAN OF CARE
Problem: Discharge Planning  Goal: Discharge to home or other facility with appropriate resources  Outcome: Progressing     Problem: Pain  Goal: Verbalizes/displays adequate comfort level or baseline comfort level  Outcome: Progressing     Problem: Skin/Tissue Integrity  Goal: Absence of new skin breakdown  Description: 1. Monitor for areas of redness and/or skin breakdown  2. Assess vascular access sites hourly  3. Every 4-6 hours minimum:  Change oxygen saturation probe site  4. Every 4-6 hours:  If on nasal continuous positive airway pressure, respiratory therapy assess nares and determine need for appliance change or resting period.   Outcome: Progressing     Problem: Safety - Adult  Goal: Free from fall injury  Outcome: Progressing     Problem: ABCDS Injury Assessment  Goal: Absence of physical injury  Outcome: Progressing     Problem: Neurosensory - Adult  Goal: Achieves stable or improved neurological status  Outcome: Progressing  Goal: Absence of seizures  Outcome: Progressing  Goal: Remains free of injury related to seizures activity  Outcome: Progressing  Goal: Achieves maximal functionality and self care  Outcome: Progressing     Problem: Musculoskeletal - Adult  Goal: Return mobility to safest level of function  Outcome: Progressing  Goal: Maintain proper alignment of affected body part  Outcome: Progressing  Goal: Return ADL status to a safe level of function  Outcome: Progressing     Problem: Genitourinary - Adult  Goal: Absence of urinary retention  Outcome: Progressing  Goal: Urinary catheter remains patent  Outcome: Progressing     Problem: Metabolic/Fluid and Electrolytes - Adult  Goal: Electrolytes maintained within normal limits  Outcome: Progressing  Goal: Hemodynamic stability and optimal renal function maintained  Outcome: Progressing  Goal: Glucose maintained within prescribed range  Outcome: Progressing     Problem: Cardiovascular - Adult  Goal: Maintains optimal cardiac output and hemodynamic stability  Outcome: Progressing  Goal: Absence of cardiac dysrhythmias or at baseline  Outcome: Progressing     Problem: Respiratory - Adult  Goal: Achieves optimal ventilation and oxygenation  Outcome: Progressing     Problem: Gastrointestinal - Adult  Goal: Maintains or returns to baseline bowel function  Outcome: Progressing  Goal: Maintains adequate nutritional intake  Outcome: Progressing

## 2022-10-04 NOTE — PROGRESS NOTES
Speech Language Pathology    SLP eval and treat order received. Attempted to see patient for clinical swallow evaluation. Pt with eyes open as therapist entered room but unresponsive to therapist, unable to follow directions, and no oral response to trial of ice chip off spoon. Oral cavity remained closed during attempt. Pt then closed eyes and unresponsive to verbal or tactile stimulation with sternal rub. Hold eval 10/4 with recommendation to continue NPO and re-attempt 10/5 if mental status improves.      Delia Mac, [address] [place] [address]  Speech-Language Pathologist  On 10/04/22 at 11:00 AM

## 2022-10-04 NOTE — PROGRESS NOTES
Progress Note    Updates  No significant events overnight.       Current Facility-Administered Medications:     sodium chloride flush 0.9 % injection 5-40 mL, 5-40 mL, IntraVENous, 2 times per day, Damaso Leiva MD, 10 mL at 10/03/22 2017    sodium chloride flush 0.9 % injection 5-40 mL, 5-40 mL, IntraVENous, PRN, Damaso Leiva MD    0.9 % sodium chloride infusion, , IntraVENous, PRN, Damaso Leiva MD    ondansetron (ZOFRAN-ODT) disintegrating tablet 4 mg, 4 mg, Oral, Q8H PRN **OR** ondansetron (ZOFRAN) injection 4 mg, 4 mg, IntraVENous, Q6H PRN, Damaso Leiva MD    polyethylene glycol (GLYCOLAX) packet 17 g, 17 g, Oral, Daily PRN, Damaso Leiva MD    acetaminophen (TYLENOL) tablet 650 mg, 650 mg, Oral, Q6H PRN **OR** acetaminophen (TYLENOL) suppository 650 mg, 650 mg, Rectal, Q6H PRN, Damaso Leiva MD, 650 mg at 10/02/22 2349    dextrose 5 % solution, , IntraVENous, Continuous, Rose Rodríguez MD, Last Rate: 50 mL/hr at 10/04/22 0305, New Bag at 10/04/22 0305    enoxaparin Sodium (LOVENOX) injection 30 mg, 30 mg, SubCUTAneous, Daily, Damaso Leiva MD, 30 mg at 10/04/22 0904    mirtazapine (REMERON) tablet 7.5 mg, 7.5 mg, Oral, Daily, Muriel Loera MD    verapamil (CALAN) tablet 120 mg, 120 mg, Oral, Nightly, Muriel Loera MD    thiamine mononitrate tablet 100 mg, 100 mg, Oral, Daily, Muriel Loera MD    vitamin B-12 (CYANOCOBALAMIN) tablet 1,000 mcg, 1,000 mcg, Oral, Daily, Muriel Loera MD    vitamin D (CHOLECALCIFEROL) tablet 1,000 Units, 1,000 Units, Oral, Daily, Muriel Loera MD    clopidogrel (PLAVIX) tablet 75 mg, 75 mg, Oral, Daily, Muriel Loera MD    ferrous sulfate EC tablet 324 mg, 324 mg, Oral, Every Other Day, Muriel Loera MD    atorvastatin (LIPITOR) tablet 80 mg, 80 mg, Oral, Nightly, Muriel Loera MD    aspirin chewable tablet 81 mg, 81 mg, Oral, Daily, Muriel Loera MD    melatonin tablet 6 mg, 6 mg, Oral, Nightly, Muriel Loera MD    multivitamin 1 tablet, 1 tablet, Oral, Daily, Bhavani Saleh MD    levETIRAcetam (KEPPRA) 500 mg/100 mL IVPB, 500 mg, IntraVENous, Q12H, Bhavani Saleh MD, Stopped at 10/04/22 0112    potassium chloride (KLOR-CON M) extended release tablet 40 mEq, 40 mEq, Oral, PRN **OR** potassium bicarb-citric acid (EFFER-K) effervescent tablet 40 mEq, 40 mEq, Oral, PRN **OR** potassium chloride 10 mEq/100 mL IVPB (Peripheral Line), 10 mEq, IntraVENous, PRN, Bhavani Saleh MD, Last Rate: 100 mL/hr at 10/03/22 1846, 10 mEq at 10/03/22 1846    Exam  Blood pressure (!) 87/55, pulse (!) 107, temperature 97.7 °F (36.5 °C), temperature source Oral, resp. rate 18, height 5' (1.524 m), weight 135 lb 2.3 oz (61.3 kg), SpO2 98 %. Constitutional    Vital signs: BP, HR, and RR reviewed   General alert, no distress  Eyes: unable to visualize the fundi  Cardiovascular: no peripheral edema. Psychiatric: no psychotic behavior noted. Neurologic  Mental status:   orientation non verbal.     Attention poor   Language non verbal.     Comprehension she is not following any commands at this time. Cranial nerves:   CN2: corneal reflexes are present. CN 3,4,6: she tends to gaze to the R at times though can cross midline and does track me when I move around the bed. Pupils are equal, round, reactive bilaterally. CN7: face symmetric   CN8: hearing grossly intact  CN12: fortino. Strength: less movement is noted in her RUE/RLE. Sensory: she is reactive to noxious pain stimulus only on L. Cerebellar/coordination: finger nose finger fortino. Tone: decreased RUE>RLE. Gait: deferred at this time given clinical condition. ROS - chart reviewed. Labs  Na 142  Mg 1.70    Folate 5.10  B12 - 1724    WBC 9.0K  Hg 12.0  Platelets 675    UA small LE, 15 WBC, 1+ bacteria    Studies  MRI brain w/o 10/3/22, independently reviewed  Impression   Left MARCELO distribution infarct without hemorrhagic transformation.   Mild   corresponding T2 FLAIR hyperintensity is identified. Mild to moderate chronic microvascular disease and involutional changes. Scattered areas of chronic lacunar type infarcts are identified both basal   ganglia regions and thalamic regions and left greater than right side of the   thanh. MRA head/neck 10/3/22, independently reviewed  No hemodynamically significant stenosis. EEG 10/3/22  Occasional sharp waves in the left temporal region, which appear most likely  consistent with focal epileptiform discharges as well as mild background  slowing and disorganization. No recorded seizures. Impression/Recommendations  Embolic appearing stroke (L MARCELO, L cerebellar). She did just have a recent stroke in June (L subcortical, L thanh). She has been on DAPT, statin. Continue. She needs an ECHO. I would actually recommend a MARLO. Continue on telemetry. If inpatient workup and monitoring is negative she will need a prolonged cardiac event monitor. I would also consider having Hematology review her hypercoagulable studies which were obtained at [facility] in June pertaining to her original stroke. Continue Keppra for seizure prophylaxis. EEG above reviewed. Rehabilitation efforts.       Dhiraj Yan NP  [address] [place] [address] [address] Neurology    A copy of this note was provided for Dr Bri Green MD

## 2022-10-05 LAB
ALBUMIN SERPL-MCNC: 2.8 G/DL (ref 3.4–5)
ANION GAP SERPL CALCULATED.3IONS-SCNC: 9 MMOL/L (ref 3–16)
BASOPHILS ABSOLUTE: 0 K/UL (ref 0–0.2)
BASOPHILS RELATIVE PERCENT: 0.7 %
BUN BLDV-MCNC: 3 MG/DL (ref 7–20)
CALCIUM SERPL-MCNC: 8.5 MG/DL (ref 8.3–10.6)
CHLORIDE BLD-SCNC: 107 MMOL/L (ref 99–110)
CO2: 22 MMOL/L (ref 21–32)
CREAT SERPL-MCNC: <0.5 MG/DL (ref 0.6–1.1)
EOSINOPHILS ABSOLUTE: 0.2 K/UL (ref 0–0.6)
EOSINOPHILS RELATIVE PERCENT: 3.2 %
GFR AFRICAN AMERICAN: >60
GFR NON-AFRICAN AMERICAN: >60
GLUCOSE BLD-MCNC: 98 MG/DL (ref 70–99)
HCT VFR BLD CALC: 32.7 % (ref 36–48)
HEMOGLOBIN: 10.5 G/DL (ref 12–16)
LYMPHOCYTES ABSOLUTE: 0.9 K/UL (ref 1–5.1)
LYMPHOCYTES RELATIVE PERCENT: 16.3 %
MAGNESIUM: 1.8 MG/DL (ref 1.8–2.4)
MCH RBC QN AUTO: 23.6 PG (ref 26–34)
MCHC RBC AUTO-ENTMCNC: 32.1 G/DL (ref 31–36)
MCV RBC AUTO: 73.3 FL (ref 80–100)
MONOCYTES ABSOLUTE: 0.5 K/UL (ref 0–1.3)
MONOCYTES RELATIVE PERCENT: 9.2 %
NEUTROPHILS ABSOLUTE: 4 K/UL (ref 1.7–7.7)
NEUTROPHILS RELATIVE PERCENT: 70.6 %
PDW BLD-RTO: 18.9 % (ref 12.4–15.4)
PHOSPHORUS: 2.5 MG/DL (ref 2.5–4.9)
PLATELET # BLD: 194 K/UL (ref 135–450)
PLATELET SLIDE REVIEW: ADEQUATE
PMV BLD AUTO: 10.5 FL (ref 5–10.5)
POTASSIUM SERPL-SCNC: 3.2 MMOL/L (ref 3.5–5.1)
RBC # BLD: 4.46 M/UL (ref 4–5.2)
SLIDE REVIEW: ABNORMAL
SODIUM BLD-SCNC: 138 MMOL/L (ref 136–145)
URINE ELECTROPHORESIS INTERP: NORMAL
WBC # BLD: 5.6 K/UL (ref 4–11)

## 2022-10-05 PROCEDURE — 6360000002 HC RX W HCPCS: Performed by: INTERNAL MEDICINE

## 2022-10-05 PROCEDURE — 1200000000 HC SEMI PRIVATE

## 2022-10-05 PROCEDURE — 94760 N-INVAS EAR/PLS OXIMETRY 1: CPT

## 2022-10-05 PROCEDURE — 83735 ASSAY OF MAGNESIUM: CPT

## 2022-10-05 PROCEDURE — 92610 EVALUATE SWALLOWING FUNCTION: CPT

## 2022-10-05 PROCEDURE — 80069 RENAL FUNCTION PANEL: CPT

## 2022-10-05 PROCEDURE — 4A00X4Z MEASUREMENT OF CENTRAL NERVOUS ELECTRICAL ACTIVITY, EXTERNAL APPROACH: ICD-10-PCS | Performed by: PSYCHIATRY & NEUROLOGY

## 2022-10-05 PROCEDURE — 51798 US URINE CAPACITY MEASURE: CPT

## 2022-10-05 PROCEDURE — 6360000002 HC RX W HCPCS: Performed by: NURSE PRACTITIONER

## 2022-10-05 PROCEDURE — 2580000003 HC RX 258: Performed by: NURSE PRACTITIONER

## 2022-10-05 PROCEDURE — 36415 COLL VENOUS BLD VENIPUNCTURE: CPT

## 2022-10-05 PROCEDURE — 85025 COMPLETE CBC W/AUTO DIFF WBC: CPT

## 2022-10-05 PROCEDURE — 95819 EEG AWAKE AND ASLEEP: CPT

## 2022-10-05 PROCEDURE — 2580000003 HC RX 258: Performed by: INTERNAL MEDICINE

## 2022-10-05 RX ORDER — POTASSIUM CHLORIDE 7.45 MG/ML
10 INJECTION INTRAVENOUS
Status: DISPENSED | OUTPATIENT
Start: 2022-10-05 | End: 2022-10-05

## 2022-10-05 RX ORDER — SODIUM CHLORIDE 450 MG/100ML
INJECTION, SOLUTION INTRAVENOUS CONTINUOUS
Status: DISCONTINUED | OUTPATIENT
Start: 2022-10-05 | End: 2022-10-07

## 2022-10-05 RX ADMIN — SODIUM CHLORIDE: 4.5 INJECTION, SOLUTION INTRAVENOUS at 11:48

## 2022-10-05 RX ADMIN — LEVETIRACETAM 750 MG: 100 INJECTION, SOLUTION INTRAVENOUS at 23:20

## 2022-10-05 RX ADMIN — SODIUM CHLORIDE: 9 INJECTION, SOLUTION INTRAVENOUS at 11:32

## 2022-10-05 RX ADMIN — POTASSIUM CHLORIDE 10 MEQ: 7.46 INJECTION, SOLUTION INTRAVENOUS at 15:29

## 2022-10-05 RX ADMIN — LEVETIRACETAM 750 MG: 100 INJECTION, SOLUTION INTRAVENOUS at 13:05

## 2022-10-05 RX ADMIN — SODIUM CHLORIDE, PRESERVATIVE FREE 10 ML: 5 INJECTION INTRAVENOUS at 01:57

## 2022-10-05 RX ADMIN — ENOXAPARIN SODIUM 30 MG: 100 INJECTION SUBCUTANEOUS at 12:07

## 2022-10-05 RX ADMIN — SODIUM CHLORIDE, PRESERVATIVE FREE 10 ML: 5 INJECTION INTRAVENOUS at 11:49

## 2022-10-05 RX ADMIN — POTASSIUM CHLORIDE 10 MEQ: 7.46 INJECTION, SOLUTION INTRAVENOUS at 17:16

## 2022-10-05 RX ADMIN — POTASSIUM CHLORIDE 10 MEQ: 7.46 INJECTION, SOLUTION INTRAVENOUS at 11:33

## 2022-10-05 NOTE — PROCEDURES
[facility]           [address]le[address] Natasha [address]                          ELECTROENCEPHALOGRAM REPORT    PATIENT NAME: Renata Carl                 :        1965  MED REC NO:   [id number]                          ROOM:       4260  ACCOUNT NO:   [de-identified]                           ADMIT DATE: 2022  PROVIDER:     Uziel Martinez DO    DATE OF EEG:  10/05/2022    REFERRING PROVIDER:  Gerard Ridley NP    REASON FOR STUDY:  Epilepsy, recent stroke and right gaze deviation. BRIEF HISTORY AND NEUROLOGIC FINDINGS:  The patient is a 70-year-old  female being evaluated for the above. MEDICATIONS:  The patient's centrally-acting medications listed include  melatonin and Remeron. EEG FINDINGS:  This is a 20-channel digital EEG performed utilizing  bipolar and referential montages. Wakefulness, drowsiness, and sleep  were obtained during the recording. During maximum wakefulness, there  was a moderate voltage, somewhat asymmetric, disorganized though  reactive background with a maximum posterior frequency of 8 Hz. The  anterior background consists of moderate voltage mixed frequencies with  frequent theta and delta slowing. Drowsiness is manifested by  attenuation of the waking background rhythms. Sleep was manifested by  poorly formed K-complexes and sleep spindles. Occasional, and at times somewhat frequent sharp waves were noted in the  bitemporal regions, left greater than right. These appeared to be  mostly independent from side to side. There was also focal slowing and  disorganization in the bilateral temporal greater than frontal regions,  right somewhat greater than left at times. The bitemporal slowing was  often equal on both sides as well. Photic stimulation was performed at various flash frequencies and did  not evoke any significant posterior driving response.   Hyperventilation  exercise was not performed

## 2022-10-05 NOTE — PLAN OF CARE
Problem: Discharge Planning  Goal: Discharge to home or other facility with appropriate resources  10/5/2022 1707 by Armida Alicea RN  Outcome: Not Progressing     Problem: Discharge Planning  Goal: Discharge to home or other facility with appropriate resources  10/5/2022 1707 by Armida Alicea RN  Outcome: Not Progressing  10/5/2022 1707 by Armida Alicea RN  Outcome: Progressing     Problem: Skin/Tissue Integrity  Goal: Absence of new skin breakdown  Description: 1. Monitor for areas of redness and/or skin breakdown  2. Assess vascular access sites hourly  3. Every 4-6 hours minimum:  Change oxygen saturation probe site  4. Every 4-6 hours:  If on nasal continuous positive airway pressure, respiratory therapy assess nares and determine need for appliance change or resting period.   10/5/2022 1707 by Armida Alicea RN  Outcome: Not Progressing  10/5/2022 1707 by Armida Alicea RN  Outcome: Progressing     Problem: Neurosensory - Adult  Goal: Achieves maximal functionality and self care  10/5/2022 1707 by Armida Alicea RN  Outcome: Not Progressing  10/5/2022 1707 by Armida Alicea RN  Outcome: Progressing     Problem: Musculoskeletal - Adult  Goal: Return mobility to safest level of function  10/5/2022 1707 by Armida Alicea RN  Outcome: Not Progressing  10/5/2022 1707 by Armida Alicea RN  Outcome: Progressing  Goal: Maintain proper alignment of affected body part  10/5/2022 1707 by Armida Alicea RN  Outcome: Not Progressing  10/5/2022 1707 by Armida Alicea RN  Outcome: Progressing  Goal: Return ADL status to a safe level of function  10/5/2022 1707 by rAmida Alicea RN  Outcome: Not Progressing  10/5/2022 1707 by Armida Alicea RN  Outcome: Progressing     Problem: Metabolic/Fluid and Electrolytes - Adult  Goal: Electrolytes maintained within normal limits  10/5/2022 1707 by Armida Alicea RN  Outcome: Not Progressing  10/5/2022 1707 by Armida Alicea RN  Outcome: Progressing  Goal: Hemodynamic stability and optimal renal function maintained  10/5/2022 1707 by Krunal Lynch RN  Outcome: Not Progressing  10/5/2022 1707 by Krunal Lynch RN  Outcome: Progressing     Problem: Cardiovascular - Adult  Goal: Absence of cardiac dysrhythmias or at baseline  10/5/2022 1707 by Krunal Lynch RN  Outcome: Not Progressing  10/5/2022 1707 by Krunal Lynch RN  Outcome: Progressing     Problem: Gastrointestinal - Adult  Goal: Maintains or returns to baseline bowel function  10/5/2022 1707 by Krunal Lynch RN  Outcome: Not Progressing  10/5/2022 1707 by Krunal Lynch RN  Outcome: Progressing  Goal: Maintains adequate nutritional intake  10/5/2022 1707 by Krunal Lynch RN  Outcome: Not Progressing  10/5/2022 1707 by Krunal Lynch RN  Outcome: Progressing

## 2022-10-05 NOTE — PROGRESS NOTES
Nephrology ([facility]) Progress Note    CC: hypernatremia and hypokalemia    Subjective:    HPI:  Breathing comfortably. MRDD. Labs noted . ROS:  opens her eyes , does not follow commands . [address]:  medications reviewed. Objective:  Blood pressure (!) 146/89, pulse (!) 107, temperature 98.3 °F (36.8 °C), temperature source Oral, resp. rate 17, height 5' (1.524 m), weight 137 lb 9.1 oz (62.4 kg), SpO2 98 %. Intake/Output Summary (Last 24 hours) at 10/5/2022 1200  Last data filed at 10/4/2022 1856  Gross per 24 hour   Intake 0 ml   Output 200 ml   Net -200 ml       General:  resting   Chest:  CTAB  CVS:  RRR  Abdominal:  NTND, soft, +BS  Extremities:  no edema  Skin:  no rash    Labs:  Renal panel:  Lab Results   Component Value Date/Time     10/05/2022 05:30 AM    K 3.2 (L) 10/05/2022 05:30 AM    CO2 22 10/05/2022 05:30 AM    BUN 3 (L) 10/05/2022 05:30 AM    CREATININE <0.5 (L) 10/05/2022 05:30 AM    CALCIUM 8.5 10/05/2022 05:30 AM    PHOS 2.5 10/05/2022 05:30 AM    MG 1.80 10/05/2022 05:30 AM     CBC:  Lab Results   Component Value Date/Time    WBC 5.6 10/05/2022 05:31 AM    HGB 10.5 (L) 10/05/2022 05:31 AM    HCT 32.7 (L) 10/05/2022 05:31 AM     10/05/2022 05:31 AM       Assessment/Plan:  Reviewed old records and labs. 1) hypernatremia   -Na better    - free water deficit resolving .         - IVF adjusted . 2) hypokalemia   - supplement prn   - magnesium level okay    3) AMS   - possibly due to hypernatremia, but it should have improved given improvement in Na, and she should not be symptomatic at this level   - neurology consulted   - MRI and MRA pending (patient can receive gadolinium)    4) FEN   - will supp K/Mg . Phos noted.      5) MRDD        Mike Moon MD,FACP

## 2022-10-05 NOTE — PROGRESS NOTES
Today Pt is more Alert, Pt followed me around the room with her eyes as I talked to her. Pt is able to turn head and look to the right or left on command when you are also standing on that same side. Pt was also able to reach out with left hand and hold my hand. Pt is able to squeeze with left hand. Right hand nothing. Pt still Non Verbal. Pt is jumpy to loud noises - Iv beeping and door slammed in hallway, Bed locking. EEG completed and available for interpretation on the [employer].

## 2022-10-05 NOTE — PROGRESS NOTES
Progress Note    Updates  No significant events overnight. Mental status is poor. Current Facility-Administered Medications:   sodium chloride flush 0.9 % injection 5-40 mL, 5-40 mL, IntraVENous, 2 times per day  sodium chloride flush 0.9 % injection 5-40 mL, 5-40 mL, IntraVENous, PRN  0.9 % sodium chloride infusion, , IntraVENous, PRN  ondansetron (ZOFRAN-ODT) disintegrating tablet 4 mg, 4 mg, Oral, Q8H PRN **OR** ondansetron (ZOFRAN) injection 4 mg, 4 mg, IntraVENous, Q6H PRN  polyethylene glycol (GLYCOLAX) packet 17 g, 17 g, Oral, Daily PRN  acetaminophen (TYLENOL) tablet 650 mg, 650 mg, Oral, Q6H PRN **OR** acetaminophen (TYLENOL) suppository 650 mg, 650 mg, Rectal, Q6H PRN  dextrose 5 % solution, , IntraVENous, Continuous  enoxaparin Sodium (LOVENOX) injection 30 mg, 30 mg, SubCUTAneous, Daily  mirtazapine (REMERON) tablet 7.5 mg, 7.5 mg, Oral, Daily  verapamil (CALAN) tablet 120 mg, 120 mg, Oral, Nightly  thiamine mononitrate tablet 100 mg, 100 mg, Oral, Daily  vitamin B-12 (CYANOCOBALAMIN) tablet 1,000 mcg, 1,000 mcg, Oral, Daily  vitamin D (CHOLECALCIFEROL) tablet 1,000 Units, 1,000 Units, Oral, Daily  clopidogrel (PLAVIX) tablet 75 mg, 75 mg, Oral, Daily  atorvastatin (LIPITOR) tablet 80 mg, 80 mg, Oral, Nightly  aspirin chewable tablet 81 mg, 81 mg, Oral, Daily  melatonin tablet 6 mg, 6 mg, Oral, Nightly  multivitamin 1 tablet, 1 tablet, Oral, Daily  levETIRAcetam (KEPPRA) 500 mg/100 mL IVPB, 500 mg, IntraVENous, Q12H  potassium chloride (KLOR-CON M) extended release tablet 40 mEq, 40 mEq, Oral, PRN **OR** potassium bicarb-citric acid (EFFER-K) effervescent tablet 40 mEq, 40 mEq, Oral, PRN **OR** potassium chloride 10 mEq/100 mL IVPB (Peripheral Line), 10 mEq, IntraVENous, PRN    Exam  Blood pressure 133/84, pulse 98, temperature 98.5 °F (36.9 °C), temperature source Axillary, resp. rate 17, height 5' (1.524 m), weight 137 lb 9.1 oz (62.4 kg), SpO2 98 %.   Constitutional    Vital signs: BP, HR, and RR reviewed   General no distress. Eyes: unable to visualize the fundi  Cardiovascular: no peripheral edema. Psychiatric: no psychotic behavior noted. Neurologic  Mental status:   orientation non verbal.     Attention poor   Language non verbal.     Comprehension she is not following any commands at this time. Cranial nerves:   CN2: corneal reflexes do seem to be present. CN 3,4,6: she has persistent R gaze preference/deviation. Pupils do appear to be round and reactive. CN7: face symmetric   CN8: hearing fortino. CN12: fortino. Strength: less movement is noted in her RUE/RLE though not following commands. Sensory: she is reactive to noxious pain stimulus primarily in her LLE. Cerebellar/coordination: finger nose finger fortino. Tone: decreased RUE>RLE. Gait: deferred at this time given clinical condition. ROS - chart reviewed. Afebrile. Labs  Na 138  Mg 1.80    Folate 5.10  B12 - 1724    WBC 5.6K  Hg 10.5  Platelets 384    UA small LE, 15 WBC, 1+ bacteria    Studies  MRI brain w/o 10/3/22  Impression   Left MARCELO distribution infarct without hemorrhagic transformation. Mild   corresponding T2 FLAIR hyperintensity is identified. Mild to moderate chronic microvascular disease and involutional changes. Scattered areas of chronic lacunar type infarcts are identified both basal   ganglia regions and thalamic regions and left greater than right side of the   thanh. MRA head/neck 10/3/22  No hemodynamically significant stenosis. EEG 10/3/22  Occasional sharp waves in the left temporal region, which appear most likely  consistent with focal epileptiform discharges as well as mild background  slowing and disorganization. No recorded seizures. Impression/Recommendations  Embolic appearing stroke (L MARCELO, L cerebellar). She did just have a recent stroke in June (L subcortical, L thanh). Baseline developmental delay. Her neurologic exam is poor.   She is not following commands and has R gaze preference/deviation. Status epilepticus needs to be ruled out. I have ordered an EEG and increased her LEV to 750 mg BID. Regarding her recent embolic strokes, would recommend a MARLO. Would also suggest a Hematology consult to review hypercoagulable studies at [facility] recently. Continue DAPT, statin. Telemetry.       Dick Mcleod NP  [address] [place] [address] [id number] Neurology    A copy of this note was provided for Dr Richar Veras MD

## 2022-10-05 NOTE — PROGRESS NOTES
Speech Language Pathology  Facility/Department: [address] MED SURG   CLINICAL BEDSIDE SWALLOW EVALUATION    NAME: Eulogio Pittman  : 1965  MRN: [id number]    ADMISSION DATE: 2022  ADMITTING DIAGNOSIS:  Hypokalemia [E87.6]  Altered mental status [R41.82]  Developmental delay [R62.50]  History of seizure disorder [Z86.69]  Acute kidney injury ([address] [N17.9]  Acute alteration in mental status [R41.82]  History of ischemic stroke [Z86.73]     Yoselyn Neville has Altered mental status; Hypernatremia; Hypokalemia; and GARRETT (acute kidney injury) ([address] on their problem list.    ONSET DATE: 2022     CHART REVIEW:  2022 admitted with c/o ROBERTO  MD ADMISSION H&P HPI:  Eulogio Pittman is 62 y.o. female with history of developmental delay, seizure disorder, stroke, sickle cell disease. Per report, at baseline patient is verbally interactive but since yesterday patient has not been talking. She is brought to the ED from her nursing home for altered mental status. On interview, she does not say a word although she is awake. She is unable to answer any questions to me. She can make eye contact and withdraws from. Work-up reveals severe hypernatremia sodium 159, hypokalemia potassium 2.8 and GARRETT which suggest severe dehydration    NPO ordered 10/1/2022 d/t AMS    10/4/2022 Neuro notes concern for new embolic CVA (L MARCELO, L cerebellar) with additional note for recent stroke in  (L subcortical, L thanh)    IMAGING:  CXR: 2022  Impression   No radiographic evidence of acute cardiopulmonary disease. CT CHEST: 2022  Impression   No evidence of pulmonary embolism or acute pulmonary abnormality. Mild left   lower lobe atelectatic changes. BRAIN MRI: 10/2/2022  Impression   Left MARCELO distribution infarct without hemorrhagic transformation. Mild   corresponding T2 FLAIR hyperintensity is identified. Mild to moderate chronic microvascular disease and involutional changes.        Scattered areas of chronic lacunar type infarcts are identified both basal   ganglia regions and thalamic regions and left greater than right side of the   thanh. DYSPHAGIA HISTORY  Mech soft/thin at [facility] prior to admission  7/7/2022  Health SLP notes: Assessment: Patient presents with increased risk of oropharyngeal dysphagia secondary to cognitive status and comorbidities. No overt s/s aspiration observed with thin liquids or hard solids. Pt with prolonged and impaired mastication of hard solids, requiring multiple liquid washes. Based on today's assessment, recommend dysphagia 2 diet with thin liquids, meds whole or crushed with puree when awake/alert. Will follow. Date of Eval: 10/5/2022  Evaluating Therapist: OBINNA Puri    Current Diet level:  Current Diet : NPO      Primary Complaint  Patient Complaint: med team concern for PO safety d/t persistent AMS (alert but not following dx or responsive)    Pain:  FLACC Score : 0    Reason for Referral  Juana Tirado was referred for a bedside swallow evaluation to assess the efficiency of her swallow function, identify signs and symptoms of aspiration and make recommendations regarding safe dietary consistencies, effective compensatory strategies, and safe eating environment. Impression  Patient met at bedside for swallow evaluation. Patient alert/awake, noted with head turned left in bed with saturated bedding d/t drooling. Patient does not follow dx and does not respond via verbalizations or gestures. Oral care administered with disorganized lingual response, no swallow response, and weak/reduced gag noted. PO trials limited d/t severity of imps: puree x2 and honey thick x1. Appears to present with moderate-severe oropharyngeal dysphagia characterized by reduced oral motor strength for bolus formation and movement, delayed swallow, weak laryngeal elevation, and rapid fatigue/reduced endurance for PO trials.  Prolonged bolus formation and movement with presented trials; prep is disorganized and weak with high risk for premature bolus loss to the pharynx with all. After 3 presentations, patient presents with fatigue and oral holding/termination of prep for PO trials. No overt signs/symptoms of penetration/aspiration with presented trials but there is concern for increased risk for potential for silent aspiration and/or reduced pharyngeal clearance. Recommend to continue NPO at this time. ST to continue to clinically assess for PO/MBS readiness; current concern for poor endurance for PO trials. If MD wishes to pursue MBS prior to SLP recommendation, MBS can be completed with MD order. It may be beneficial to consider temporary vs long-term alternate nutrition needs; concern for reduced/absent intake since at least 10/1. Dysphagia Outcome Severity Scale: Level 2: Moderate Severe dysphagia- Maximum assistance or maximum use of strategies with partial PO only     Treatment Plan  Requires SLP Intervention: Yes  Duration of Treatment: ST to tx 3-5 times per week during acute admission  D/C Recommendations: Ongoing speech therapy is recommended at next level of care    Recommended Diet and Intervention  NPO  Recommended Form of Meds: via alternate means (IV/rectal)     Therapeutic Interventions: Therapeutic PO trials with SLP;Patient/Family education;Oral care    Compensatory Swallowing Strategies   ROUTINE ORAL CARE    Treatment/Goals  Dysphagia Goals: The patient will tolerate ongoing dysphagia evaluation (clinical vs instrumental) with additional goals to be determined as appropriate. General  Chart Reviewed: Yes  Behavior/Cognition: Alert; Cooperative; Doesn't follow directions; Requires cueing  Respiratory Status: Room air  Communication Observation: Non-verbal  Follows Directions: None  Dentition: Some missing teeth  Patient Positioning: Upright in bed  Baseline Vocal Quality:  (WERNER)  Volitional Cough:  (WERNER)  Volitional Swallow:  (WERNER)  Consistencies Administered: Pureed; Moderately Thick  - cup    Vision/Hearing  Vision  Vision:  (limited ability to assess; appears to visually track SLP)  Hearing  Hearing:  (unable to assess; appears adequate for assessment needs)    Oral Motor Deficits      Oral Phase Dysfunction  rapid mechanism fatigue: tolerates puree x2 and honey x1 prior to oral holding;   prolonged, disorganized bolus formation and movement: all;   high risk for premature bolus loss to the pharynx: all     Indicators of Pharyngeal Phase Dysfunction  delayed swallow: all;   weak laryngeal elevation: all;   no overt signs/symptoms of penetration/aspiration, but there is concern for potential for increased risk for silent aspiration    Prognosis  Speech Therapy Prognosis  Prognosis: Fair (for dysphagia d/t cocnern for severity of current impairments)  Prognosis Considerations: Co-Morbidities; Severity of Impairments    Individuals consulted  Consulted and agree with results and recommendations: Patient;RN    Education  Patient Education: completed on resutls/recs/plan  Patient Education Response: No evidence of learning         Therapy Time  SLP Individual Minutes  Time In: 0825  Time Out: 0845  Minutes: [address]  [place], [id number] [place] [address], [id number]  Speech-Language Pathologist  Portable phone: [phone]  10/5/2022 8:45 AM

## 2022-10-05 NOTE — PROGRESS NOTES
Hospitalist Progress Note      PCP: Sherren Combs, MD    Date of Admission: 9/29/2022    Subjective: cont to be minimally responsive    Medications:  Reviewed    Infusion Medications    sodium chloride      dextrose 50 mL/hr at 10/04/22 2016     Scheduled Medications    sodium chloride flush  5-40 mL IntraVENous 2 times per day    enoxaparin  30 mg SubCUTAneous Daily    mirtazapine  7.5 mg Oral Daily    verapamil  120 mg Oral Nightly    vitamin B-1  100 mg Oral Daily    vitamin B-12  1,000 mcg Oral Daily    Vitamin D  1,000 Units Oral Daily    clopidogrel  75 mg Oral Daily    atorvastatin  80 mg Oral Nightly    aspirin  81 mg Oral Daily    melatonin  6 mg Oral Nightly    multivitamin  1 tablet Oral Daily    levETIRAcetam  500 mg IntraVENous Q12H     PRN Meds: sodium chloride flush, sodium chloride, ondansetron **OR** ondansetron, polyethylene glycol, acetaminophen **OR** acetaminophen, potassium chloride **OR** potassium alternative oral replacement **OR** potassium chloride      Intake/Output Summary (Last 24 hours) at 10/4/2022 2232  Last data filed at 10/4/2022 1856  Gross per 24 hour   Intake 0 ml   Output 1122 ml   Net -1122 ml       Physical Exam Performed:    BP (!) 164/107   Pulse (!) 109   Temp 98.7 °F (37.1 °C) (Axillary)   Resp 18   Ht 5' (1.524 m)   Wt 135 lb 2.3 oz (61.3 kg)   SpO2 98%   BMI 26.39 kg/m²        General appearance: Lethargic, chronically ill appearing. No apparent distress, appears stated age and cooperative. HEENT: Pupils equal, round, and reactive to light. Conjunctivae/corneas clear. Neck: Supple, with full range of motion. No jugular venous distention. Trachea midline. Respiratory:  Normal respiratory effort. Clear to auscultation, bilaterally without Rales/Wheezes/Rhonchi. Cardiovascular: Regular rate and rhythm with normal S1/S2 without murmurs, rubs or gallops. Abdomen: Soft, non-tender, non-distended with normal bowel sounds.   Musculoskeletal: No clubbing, cyanosis or edema bilaterally. Full range of motion without deformity. Skin: Skin color, texture, turgor normal.  No rashes or lesions. Neurologic:  Unable to cooperate with exam.  Psychiatric: Lethargic. Capillary Refill: Brisk,< 3 seconds   Peripheral Pulses: +2 palpable, equal bilaterally        Labs:   Recent Labs     10/02/22  0821   WBC 9.0   HGB 12.0   HCT 37.7        Recent Labs     10/02/22  0821 10/02/22  1653 10/03/22  0011 10/03/22  0754 10/03/22  1122 10/04/22  0608     141   < >  --  139  139 140 142   K 2.9*  --  4.2 3.1*  --  3.6     --   --  105  --  109   CO2 23  --   --  22  --  22   BUN 7  --   --  5*  --  4*   CREATININE 0.5*  --   --  <0.5*  --  <0.5*   CALCIUM 8.6  --   --  8.2*  --  8.5   PHOS 2.1*  --  3.3 2.9  --  2.4*    < > = values in this interval not displayed. No results for input(s): AST, ALT, BILIDIR, BILITOT, ALKPHOS in the last 72 hours. No results for input(s): INR in the last 72 hours. No results for input(s): Randene Holes in the last 72 hours. Urinalysis:      Lab Results   Component Value Date/Time    NITRU Negative 10/01/2022 04:05 AM    WBCUA 15 10/01/2022 04:05 AM    BACTERIA 1+ 10/01/2022 04:05 AM    RBCUA 1 10/01/2022 04:05 AM    BLOODU Negative 10/01/2022 04:05 AM    SPECGRAV 1.037 10/01/2022 04:05 AM    GLUCOSEU Negative 10/01/2022 04:05 AM       Radiology:  MRI BRAIN WO CONTRAST   Final Result   Left MARCELO distribution infarct without hemorrhagic transformation. Mild   corresponding T2 FLAIR hyperintensity is identified. Mild to moderate chronic microvascular disease and involutional changes. Scattered areas of chronic lacunar type infarcts are identified both basal   ganglia regions and thalamic regions and left greater than right side of the   thanh. The findings were sent to the Radiology Results [address] at 5:43   pm on 10/3/2022 to be communicated to a licensed caregiver.          MRA HEAD WO CONTRAST Hypernatremia - suspect due to severe dehydration, improving  -continue D5W  -nephrology consulted, recs appreciated  -BMP q6h     Hypokalemia  -replace PRN  -trend and monitor  -nephrology following     GARRETT - resolved  - likely prerenal due to severe dehydration, improved  -continue IVF  -nephrology managing fluids  -avoid nephrotoxic medications  -hold home ACEi  -trend BMP  -checked urine studies     Hx CVA  -continue home meds including DAPT and high intensity statin     Hx seizure disorder  -change to IV Keppra and continue  - EEG reviewed     Developmental delay  Sickle cell disease          DVT Prophylaxis: Lovenox  Diet: Diet NPO  Code Status: Full Code  PT/OT Eval Status: ordered    Semaj Jaffe MD

## 2022-10-06 ENCOUNTER — APPOINTMENT (OUTPATIENT)
Dept: CT IMAGING | Age: 57
DRG: 426 | End: 2022-10-06
Payer: MEDICAID

## 2022-10-06 ENCOUNTER — APPOINTMENT (OUTPATIENT)
Dept: GENERAL RADIOLOGY | Age: 57
DRG: 426 | End: 2022-10-06
Payer: MEDICAID

## 2022-10-06 PROBLEM — E43 SEVERE MALNUTRITION (HCC): Chronic | Status: ACTIVE | Noted: 2022-10-06

## 2022-10-06 LAB
ALBUMIN SERPL-MCNC: 2.9 G/DL (ref 3.4–5)
ANION GAP SERPL CALCULATED.3IONS-SCNC: 12 MMOL/L (ref 3–16)
BASOPHILS ABSOLUTE: 0 K/UL (ref 0–0.2)
BASOPHILS RELATIVE PERCENT: 0.6 %
BUN BLDV-MCNC: 3 MG/DL (ref 7–20)
CALCIUM SERPL-MCNC: 8.7 MG/DL (ref 8.3–10.6)
CHLORIDE BLD-SCNC: 106 MMOL/L (ref 99–110)
CO2: 22 MMOL/L (ref 21–32)
CREAT SERPL-MCNC: <0.5 MG/DL (ref 0.6–1.1)
EOSINOPHILS ABSOLUTE: 0.1 K/UL (ref 0–0.6)
EOSINOPHILS RELATIVE PERCENT: 2.8 %
GFR AFRICAN AMERICAN: >60
GFR NON-AFRICAN AMERICAN: >60
GLUCOSE BLD-MCNC: 78 MG/DL (ref 70–99)
GLUCOSE BLD-MCNC: 81 MG/DL (ref 70–99)
GLUCOSE BLD-MCNC: 82 MG/DL (ref 70–99)
GLUCOSE BLD-MCNC: 85 MG/DL (ref 70–99)
HCT VFR BLD CALC: 33.7 % (ref 36–48)
HEMOGLOBIN: 10.7 G/DL (ref 12–16)
LYMPHOCYTES ABSOLUTE: 1 K/UL (ref 1–5.1)
LYMPHOCYTES RELATIVE PERCENT: 20.4 %
MAGNESIUM: 1.7 MG/DL (ref 1.8–2.4)
MCH RBC QN AUTO: 23.4 PG (ref 26–34)
MCHC RBC AUTO-ENTMCNC: 31.8 G/DL (ref 31–36)
MCV RBC AUTO: 73.7 FL (ref 80–100)
MONOCYTES ABSOLUTE: 0.5 K/UL (ref 0–1.3)
MONOCYTES RELATIVE PERCENT: 10.1 %
NEUTROPHILS ABSOLUTE: 3.3 K/UL (ref 1.7–7.7)
NEUTROPHILS RELATIVE PERCENT: 66.1 %
PDW BLD-RTO: 18.9 % (ref 12.4–15.4)
PERFORMED ON: NORMAL
PHOSPHORUS: 2.8 MG/DL (ref 2.5–4.9)
PLATELET # BLD: 227 K/UL (ref 135–450)
PMV BLD AUTO: 9.9 FL (ref 5–10.5)
POTASSIUM SERPL-SCNC: 3.5 MMOL/L (ref 3.5–5.1)
RBC # BLD: 4.58 M/UL (ref 4–5.2)
SODIUM BLD-SCNC: 140 MMOL/L (ref 136–145)
WBC # BLD: 5 K/UL (ref 4–11)

## 2022-10-06 PROCEDURE — 70498 CT ANGIOGRAPHY NECK: CPT

## 2022-10-06 PROCEDURE — 2580000003 HC RX 258: Performed by: NURSE PRACTITIONER

## 2022-10-06 PROCEDURE — 36415 COLL VENOUS BLD VENIPUNCTURE: CPT

## 2022-10-06 PROCEDURE — 70450 CT HEAD/BRAIN W/O DYE: CPT

## 2022-10-06 PROCEDURE — 6360000002 HC RX W HCPCS: Performed by: INTERNAL MEDICINE

## 2022-10-06 PROCEDURE — 4A03X5D MEASUREMENT OF ARTERIAL FLOW, INTRACRANIAL, EXTERNAL APPROACH: ICD-10-PCS | Performed by: STUDENT IN AN ORGANIZED HEALTH CARE EDUCATION/TRAINING PROGRAM

## 2022-10-06 PROCEDURE — 6360000002 HC RX W HCPCS: Performed by: NURSE PRACTITIONER

## 2022-10-06 PROCEDURE — 85025 COMPLETE CBC W/AUTO DIFF WBC: CPT

## 2022-10-06 PROCEDURE — 71045 X-RAY EXAM CHEST 1 VIEW: CPT

## 2022-10-06 PROCEDURE — 83735 ASSAY OF MAGNESIUM: CPT

## 2022-10-06 PROCEDURE — 94760 N-INVAS EAR/PLS OXIMETRY 1: CPT

## 2022-10-06 PROCEDURE — 1200000000 HC SEMI PRIVATE

## 2022-10-06 PROCEDURE — 80069 RENAL FUNCTION PANEL: CPT

## 2022-10-06 PROCEDURE — 6360000004 HC RX CONTRAST MEDICATION: Performed by: INTERNAL MEDICINE

## 2022-10-06 PROCEDURE — 92526 ORAL FUNCTION THERAPY: CPT

## 2022-10-06 PROCEDURE — 2580000003 HC RX 258: Performed by: INTERNAL MEDICINE

## 2022-10-06 RX ORDER — ONDANSETRON 2 MG/ML
4 INJECTION INTRAMUSCULAR; INTRAVENOUS EVERY 6 HOURS PRN
Status: DISCONTINUED | OUTPATIENT
Start: 2022-10-06 | End: 2022-10-07 | Stop reason: SDUPTHER

## 2022-10-06 RX ORDER — MAGNESIUM SULFATE IN WATER 40 MG/ML
2000 INJECTION, SOLUTION INTRAVENOUS ONCE
Status: COMPLETED | OUTPATIENT
Start: 2022-10-06 | End: 2022-10-06

## 2022-10-06 RX ORDER — POTASSIUM CHLORIDE 7.45 MG/ML
10 INJECTION INTRAVENOUS
Status: COMPLETED | OUTPATIENT
Start: 2022-10-06 | End: 2022-10-07

## 2022-10-06 RX ORDER — ONDANSETRON 4 MG/1
4 TABLET, ORALLY DISINTEGRATING ORAL EVERY 8 HOURS PRN
Status: DISCONTINUED | OUTPATIENT
Start: 2022-10-06 | End: 2022-10-07 | Stop reason: SDUPTHER

## 2022-10-06 RX ORDER — CARVEDILOL 6.25 MG/1
6.25 TABLET ORAL 2 TIMES DAILY WITH MEALS
Status: DISCONTINUED | OUTPATIENT
Start: 2022-10-06 | End: 2022-10-13

## 2022-10-06 RX ORDER — POLYETHYLENE GLYCOL 3350 17 G/17G
17 POWDER, FOR SOLUTION ORAL DAILY PRN
Status: DISCONTINUED | OUTPATIENT
Start: 2022-10-06 | End: 2022-10-13 | Stop reason: HOSPADM

## 2022-10-06 RX ORDER — POTASSIUM CHLORIDE 7.45 MG/ML
10 INJECTION INTRAVENOUS
Status: DISCONTINUED | OUTPATIENT
Start: 2022-10-06 | End: 2022-10-06

## 2022-10-06 RX ADMIN — MAGNESIUM SULFATE HEPTAHYDRATE 2000 MG: 40 INJECTION, SOLUTION INTRAVENOUS at 14:14

## 2022-10-06 RX ADMIN — SODIUM CHLORIDE, PRESERVATIVE FREE 10 ML: 5 INJECTION INTRAVENOUS at 08:14

## 2022-10-06 RX ADMIN — ENOXAPARIN SODIUM 30 MG: 100 INJECTION SUBCUTANEOUS at 08:13

## 2022-10-06 RX ADMIN — IOPAMIDOL 75 ML: 755 INJECTION, SOLUTION INTRAVENOUS at 12:31

## 2022-10-06 RX ADMIN — LEVETIRACETAM 750 MG: 100 INJECTION, SOLUTION INTRAVENOUS at 13:16

## 2022-10-06 RX ADMIN — Medication 10 MEQ: at 16:05

## 2022-10-06 RX ADMIN — Medication 10 MEQ: at 18:24

## 2022-10-06 RX ADMIN — SODIUM CHLORIDE: 4.5 INJECTION, SOLUTION INTRAVENOUS at 08:19

## 2022-10-06 RX ADMIN — Medication 10 MEQ: at 17:05

## 2022-10-06 ASSESSMENT — PAIN SCALES - WONG BAKER
WONGBAKER_NUMERICALRESPONSE: 0

## 2022-10-06 NOTE — CONSULTS
GASTROENTEROLOGY INPATIENT CONSULTATION        IDENTIFYING DATA/REASON FOR CONSULTATION   PATIENT:  Regi Elliott  MRN:  [id number]  ADMIT DATE: 9/29/2022  TIME OF EVALUATION: 10/6/2022 2:29 PM  HOSPITAL STAY:   LOS: 6 days     REASON FOR CONSULTATION:  PEG tube    HISTORY OF PRESENT ILLNESS   Regi Elliott is a 62 y.o. female with a PMH of CVA, developmental delay, seizures, hypertension, iron deficiency anemia who presented on 9/29/2022 from nursing home with altered mental status. Initially admitted with hyponatremia, GARRETT, hypokalemia and acute metabolic encephalopathy. She had new right-sided weakness and an MRI of the brain was obtained on 10/3 which noted left MARCELO infarct, likely embolic. She was placed on Plavix and aspirin. We have been consulted regarding PEG placement. This morning she had code stroke called due to decreased responsiveness. Neurology has been following. Repeat CT head was without any acute findings. CTA showed occlusion of the proximal left A2 segment. She remains on Plavix and aspirin. At time of visit patient very drowsy. She opens her eyes but does not follow commands. Abdomen soft, nondistended. No surgical scars noted. Prior Endoscopic Evaluations: unknown    PAST MEDICAL, SURGICAL, FAMILY, and SOCIAL HISTORY   History reviewed. No pertinent past medical history. History reviewed. No pertinent surgical history. History reviewed. No pertinent family history.   Social History     Socioeconomic History    Marital status: Single     Spouse name: None    Number of children: None    Years of education: None    Highest education level: None       MEDICATIONS   SCHEDULED:  magnesium sulfate, 2,000 mg, Once  carvedilol, 6.25 mg, BID WC  potassium chloride, 10 mEq, Q1H  levETIRAcetam, 750 mg, Q12H  sodium chloride flush, 5-40 mL, 2 times per day  enoxaparin, 30 mg, Daily  mirtazapine, 7.5 mg, Daily  verapamil, 120 mg, Nightly  vitamin B-1, 100 mg, Daily  vitamin B-12, 1,000 mcg, Daily  Vitamin D, 1,000 Units, Daily  clopidogrel, 75 mg, Daily  atorvastatin, 80 mg, Nightly  aspirin, 81 mg, Daily  melatonin, 6 mg, Nightly  multivitamin, 1 tablet, Daily      FLUIDS/DRIPS:     sodium chloride 50 mL/hr at 10/06/22 0819    sodium chloride 5 mL/hr at 10/05/22 2256     PRNs: ondansetron, 4 mg, Q8H PRN   Or  ondansetron, 4 mg, Q6H PRN  polyethylene glycol, 17 g, Daily PRN  sodium chloride flush, 5-40 mL, PRN  sodium chloride, , PRN  ondansetron, 4 mg, Q8H PRN   Or  ondansetron, 4 mg, Q6H PRN  acetaminophen, 650 mg, Q6H PRN   Or  acetaminophen, 650 mg, Q6H PRN  potassium chloride, 40 mEq, PRN   Or  potassium alternative oral replacement, 40 mEq, PRN   Or  potassium chloride, 10 mEq, PRN      ALLERGIES:  She No Known Allergies    REVIEW OF SYSTEMS   Pertinent ROS noted in HPI    PHYSICAL EXAM     Vitals:    10/06/22 0355 10/06/22 0524 10/06/22 0850 10/06/22 1232   BP: (!) 141/97  (!) 140/90 (!) 155/104   Pulse: 96  97 (!) 104   Resp: 17  18 16   Temp: 98.2 °F (36.8 °C)  98.6 °F (37 °C) 99.1 °F (37.3 °C)   TempSrc: Axillary  Axillary Axillary   SpO2: 98%  96%    Weight:  136 lb 0.4 oz (61.7 kg)     Height:    5' (1.524 m)       I/O last 3 completed shifts: In: 4535.7 [I.V.:3235.7; IV Piggyback:1300]  Out: 900 [Urine:900]      Physical Exam:  General appearance: alert, does not follow commands  Eyes: Anicteric  Head: Normocephalic, without obvious abnormality  Lungs: clear to auscultation bilaterally, Normal Effort  Heart: regular rate and rhythm, normal S1 and S2, no murmurs or rubs  Abdomen: soft, non-distended, non-tender. Bowel sounds normal. No masses,  no organomegaly.    Extremities: atraumatic, no cyanosis or edema  Skin: warm and dry, no jaundice  Neuro: does not follow commands      LABS AND IMAGING   Laboratory   Recent Labs     10/05/22  0531 10/06/22  0441   WBC 5.6 5.0   HGB 10.5* 10.7*   HCT 32.7* 33.7*   MCV 73.3* 73.7*    227     Recent Labs     10/04/22  0608 10/05/22  0530 10/06/22  0441    138 140   K 3.6 3.2* 3.5    107 106   CO2 22 22 22   PHOS 2.4* 2.5 2.8   BUN 4* 3* 3*   CREATININE <0.5* <0.5* <0.5*     No results for input(s): AST, ALT, ALB, BILIDIR, BILITOT, ALKPHOS in the last 72 hours. No results for input(s): LIPASE, AMYLASE in the last 72 hours. No results for input(s): PROTIME, INR in the last 72 hours. Imaging  CTA HEAD NECK W CONTRAST   Final Result   1. Occlusion of the proximal left A2 segment. 2. Mid basilar trunk aneurysm, ventrally directed with the base measuring 4   mm in width, 2.5 mm base to apex measurement. 3. Tortuous appearance of the bilateral distal cervical ICAs. 4. Mild-to-moderate luminal irregularity involving the PCAs. 5. Correlate with concerns for underlying arteriopathy/vasculopathy. CT HEAD WO CONTRAST   Final Result   1. Subacute infarct in the left frontal lobe in the distribution of the MARCELO   characterized on recent CT and MRI. 2. On the current exam, there is no pattern of significant edema or mass   effect relating to this infarct. No new area of edema is appreciated. 3. No evidence of hemorrhagic transformation. Findings were discussed with Douglas Hernandez RN, at 12:41 pm on 10/6/2022. MRI BRAIN WO CONTRAST   Final Result   Left MARCELO distribution infarct without hemorrhagic transformation. Mild   corresponding T2 FLAIR hyperintensity is identified. Mild to moderate chronic microvascular disease and involutional changes. Scattered areas of chronic lacunar type infarcts are identified both basal   ganglia regions and thalamic regions and left greater than right side of the   thanh. The findings were sent to the Radiology Results [id number] at 5:43   pm on 10/3/2022 to be communicated to a licensed caregiver. MRA HEAD WO CONTRAST   Final Result   No definite large vessel occlusion to account for the left MARCELO infarct on the   MRI.          MRA NECK WO CONTRAST   Final Result   Negative for hemodynamic stenosis. XR ABDOMEN (KUB) (SINGLE AP VIEW)   Final Result   No radiopaque foreign body is evident. CT HEAD WO CONTRAST   Final Result   1. No acute intracranial bleed. 2. Multifocal age indeterminate lacunar strokes involve right centrum   semiovale, bilateral basal ganglia and left caudate lobe. 3.  White matter hypoattenuation described is typical of microvascular   ischemic disease or as sequela of dysmyelinating/demyelinating processes. 4.  Vascular calcifications are noted reflecting calcific atherosclerosis. CT CHEST PULMONARY EMBOLISM W CONTRAST   Final Result   No evidence of pulmonary embolism or acute pulmonary abnormality. Mild left   lower lobe atelectatic changes. XR CHEST PORTABLE   Final Result   No radiographic evidence of acute cardiopulmonary disease. ASSESSMENT AND RECOMMENDATIONS   62 y.o. female with a PMH of CVA, developmental delay, seizures, hypertension, iron deficiency anemia who presented on 9/29/2022 from nursing home with altered mental status. IMPRESSION:  Embolic left CVA  Oropharyngeal dysphagia 2/2 #1  History of developmental delay    RECOMMENDATIONS:    Could consider PEG placement once able to hold Plavix for 5 days prior. Will defer holding Plavix to neurology. Could consider NG tube placement for nutrition support in the interim. If you have any questions or need any further information, please feel free to contact our consult team.  Thank you for allowing us to participate in the care of Yoselyn Neville. The note was completed using Dragon voice recognition transcription. Every effort was made to ensure accuracy; however, inadvertent transcription errors may be present despite my best efforts to edit errors.       Adali Rod PA-C

## 2022-10-06 NOTE — PROGRESS NOTES
Hospitalist Progress Note      PCP: Connor Fisher MD    Date of Admission: 9/29/2022    Subjective: Na improved, still minimally responsive    Medications:  Reviewed    Infusion Medications    sodium chloride 50 mL/hr at 10/05/22 2256    sodium chloride 5 mL/hr at 10/05/22 2256     Scheduled Medications    levETIRAcetam  750 mg IntraVENous Q12H    sodium chloride flush  5-40 mL IntraVENous 2 times per day    enoxaparin  30 mg SubCUTAneous Daily    mirtazapine  7.5 mg Oral Daily    verapamil  120 mg Oral Nightly    vitamin B-1  100 mg Oral Daily    vitamin B-12  1,000 mcg Oral Daily    Vitamin D  1,000 Units Oral Daily    clopidogrel  75 mg Oral Daily    atorvastatin  80 mg Oral Nightly    aspirin  81 mg Oral Daily    melatonin  6 mg Oral Nightly    multivitamin  1 tablet Oral Daily     PRN Meds: sodium chloride flush, sodium chloride, ondansetron **OR** ondansetron, polyethylene glycol, acetaminophen **OR** acetaminophen, potassium chloride **OR** potassium alternative oral replacement **OR** potassium chloride      Intake/Output Summary (Last 24 hours) at 10/6/2022 0057  Last data filed at 10/5/2022 2256  Gross per 24 hour   Intake 4535.7 ml   Output 900 ml   Net 3635.7 ml       Physical Exam Performed:    BP (!) 166/101   Pulse (!) 109   Temp 98.4 °F (36.9 °C) (Axillary)   Resp 18   Ht 5' (1.524 m)   Wt 137 lb 9.1 oz (62.4 kg)   SpO2 97%   BMI 26.87 kg/m²     General appearance: Lethargic, chronically ill appearing. No apparent distress, appears stated age and cooperative. HEENT: Pupils equal, round, and reactive to light. Conjunctivae/corneas clear. Neck: Supple, with full range of motion. No jugular venous distention. Trachea midline. Respiratory:  Normal respiratory effort. Clear to auscultation, bilaterally without Rales/Wheezes/Rhonchi. Cardiovascular: Regular rate and rhythm with normal S1/S2 without murmurs, rubs or gallops.   Abdomen: Soft, non-tender, non-distended with normal bowel sounds. Musculoskeletal: No clubbing, cyanosis or edema bilaterally. Full range of motion without deformity. Skin: Skin color, texture, turgor normal.  No rashes or lesions. Neurologic:  Unable to cooperate with exam.  Psychiatric: Lethargic. Capillary Refill: Brisk,< 3 seconds   Peripheral Pulses: +2 palpable, equal bilaterally        Labs:   Recent Labs     10/05/22  0531   WBC 5.6   HGB 10.5*   HCT 32.7*        Recent Labs     10/03/22  0754 10/03/22  1122 10/04/22  0608 10/05/22  0530     139 140 142 138   K 3.1*  --  3.6 3.2*     --  109 107   CO2 22  --  22 22   BUN 5*  --  4* 3*   CREATININE <0.5*  --  <0.5* <0.5*   CALCIUM 8.2*  --  8.5 8.5   PHOS 2.9  --  2.4* 2.5     No results for input(s): AST, ALT, BILIDIR, BILITOT, ALKPHOS in the last 72 hours. No results for input(s): INR in the last 72 hours. No results for input(s): [place] in the last 72 hours. Urinalysis:      Lab Results   Component Value Date/Time    NITRU Negative 10/01/2022 04:05 AM    WBCUA 15 10/01/2022 04:05 AM    BACTERIA 1+ 10/01/2022 04:05 AM    RBCUA 1 10/01/2022 04:05 AM    BLOODU Negative 10/01/2022 04:05 AM    SPECGRAV 1.037 10/01/2022 04:05 AM    GLUCOSEU Negative 10/01/2022 04:05 AM       Radiology:  MRI BRAIN WO CONTRAST   Final Result   Left MARCELO distribution infarct without hemorrhagic transformation. Mild   corresponding T2 FLAIR hyperintensity is identified. Mild to moderate chronic microvascular disease and involutional changes. Scattered areas of chronic lacunar type infarcts are identified both basal   ganglia regions and thalamic regions and left greater than right side of the   thanh. The findings were sent to the Radiology Results [address] at 5:43   pm on 10/3/2022 to be communicated to a licensed caregiver. MRA HEAD WO CONTRAST   Final Result   No definite large vessel occlusion to account for the left MARCELO infarct on the   MRI.          MRA NECK WO CONTRAST   Final Result   Negative for hemodynamic stenosis. XR ABDOMEN (KUB) (SINGLE AP VIEW)   Final Result   No radiopaque foreign body is evident. CT HEAD WO CONTRAST   Final Result   1. No acute intracranial bleed. 2. Multifocal age indeterminate lacunar strokes involve right centrum   semiovale, bilateral basal ganglia and left caudate lobe. 3.  White matter hypoattenuation described is typical of microvascular   ischemic disease or as sequela of dysmyelinating/demyelinating processes. 4.  Vascular calcifications are noted reflecting calcific atherosclerosis. CT CHEST PULMONARY EMBOLISM W CONTRAST   Final Result   No evidence of pulmonary embolism or acute pulmonary abnormality. Mild left   lower lobe atelectatic changes. XR CHEST PORTABLE   Final Result   No radiographic evidence of acute cardiopulmonary disease. Assessment/Plan:    Active Hospital Problems    Diagnosis     Altered mental status [R41.82]      Priority: Medium    Hypernatremia [E87.0]      Priority: Medium    Hypokalemia [E87.6]      Priority: Medium    GARRETT (acute kidney injury) ([facility] [address] [N17.9]      Priority: Medium         Acute metabolic encephalopathy - unclear etiology, didn't improve significantly with electrolyte correction. Repeat U/A showed pyuria.  -continue D5W  -nephrology consulted, recs appreciated  -BMP q6h  -neurology consulted, recs appreciated  -check procalcitonin  -check molecular PCR panel including COVID  -MRI Brain without contrast with left MARCELO stroke  -MRA Head and Neck with contrast   -EEG reviewed, abnormal - cont keppra  -TSH WNL  -vitamin B1 sent  -vitamin B12 WNL     Acute CVA - MRI reviewed, neurology following. On ASA/plavix/statin.  Check MARLO and consult hem/onc for suspected hypercoag work=up per neuro recs     Pyuria - UTI ruled out after further study  -stop ceftriaxone  -urine culture showed mixed jovon only     Hypernatremia - suspect due to severe

## 2022-10-06 NOTE — PLAN OF CARE
Problem: Pain  Goal: Verbalizes/displays adequate comfort level or baseline comfort level  10/5/2022 2305 by Harry Rivas RN  Outcome: Progressing  10/5/2022 1707 by Taya Leiva RN  Outcome: Progressing  10/5/2022 1707 by Taya Leiva RN  Outcome: Progressing     Problem: Discharge Planning  Goal: Discharge to home or other facility with appropriate resources  10/5/2022 1707 by Taya Leiva RN  Outcome: Not Progressing  10/5/2022 1707 by Taya Leiva RN  Outcome: Progressing     Problem: Skin/Tissue Integrity  Goal: Absence of new skin breakdown  Description: 1. Monitor for areas of redness and/or skin breakdown  2. Assess vascular access sites hourly  3. Every 4-6 hours minimum:  Change oxygen saturation probe site  4. Every 4-6 hours:  If on nasal continuous positive airway pressure, respiratory therapy assess nares and determine need for appliance change or resting period.   10/5/2022 1707 by Taya Leiva RN  Outcome: Not Progressing  10/5/2022 1707 by Taya Leiva RN  Outcome: Progressing     Problem: Neurosensory - Adult  Goal: Achieves maximal functionality and self care  10/5/2022 1707 by Taya Leiva RN  Outcome: Not Progressing  10/5/2022 1707 by Taya Leiva RN  Outcome: Progressing     Problem: Musculoskeletal - Adult  Goal: Return mobility to safest level of function  10/5/2022 1707 by Taya Leiva RN  Outcome: Not Progressing  10/5/2022 1707 by Taya Leiva RN  Outcome: Progressing  Goal: Maintain proper alignment of affected body part  10/5/2022 1707 by Taya Leiva RN  Outcome: Not Progressing  10/5/2022 1707 by Taya Leiva RN  Outcome: Progressing  Goal: Return ADL status to a safe level of function  10/5/2022 1707 by Taya Leiva RN  Outcome: Not Progressing  10/5/2022 1707 by Taya Leiva RN  Outcome: Progressing     Problem: Metabolic/Fluid and Electrolytes - Adult  Goal: Electrolytes maintained within normal limits  10/5/2022 1707 by Taya Leiva RN  Outcome: Not Progressing  10/5/2022 1707 by Jazlyn Vasquez, RN  Outcome: Progressing  Goal: Hemodynamic stability and optimal renal function maintained  10/5/2022 1707 by Jazlyn Vasquez, RN  Outcome: Not Progressing  10/5/2022 1707 by Jazlyn Vasquez, RN  Outcome: Progressing     Problem: Cardiovascular - Adult  Goal: Absence of cardiac dysrhythmias or at baseline  10/5/2022 1707 by Jazlyn Vasquez, RN  Outcome: Not Progressing  10/5/2022 1707 by Jazlyn Vasquez, RN  Outcome: Progressing     Problem: Gastrointestinal - Adult  Goal: Maintains or returns to baseline bowel function  10/5/2022 1707 by Jazlyn Vasquez, RN  Outcome: Not Progressing  10/5/2022 1707 by Jazlyn Vasquez, RN  Outcome: Progressing  Goal: Maintains adequate nutritional intake  10/5/2022 1707 by Jazlyn Vasquez, RN  Outcome: Not Progressing  10/5/2022 1707 by Jazlyn Vasquez, RN  Outcome: Progressing

## 2022-10-06 NOTE — PLAN OF CARE
Problem: Discharge Planning  Goal: Discharge to home or other facility with appropriate resources  Outcome: Progressing  Flowsheets (Taken 10/6/2022 0850)  Discharge to home or other facility with appropriate resources: Identify barriers to discharge with patient and caregiver     Problem: Pain  Goal: Verbalizes/displays adequate comfort level or baseline comfort level  10/6/2022 0934 by Rosalba Patel RN  Outcome: Progressing  10/5/2022 2305 by Loki Jordan RN  Outcome: Progressing     Problem: Skin/Tissue Integrity  Goal: Absence of new skin breakdown  Description: 1. Monitor for areas of redness and/or skin breakdown  2. Assess vascular access sites hourly  3. Every 4-6 hours minimum:  Change oxygen saturation probe site  4. Every 4-6 hours:  If on nasal continuous positive airway pressure, respiratory therapy assess nares and determine need for appliance change or resting period. Outcome: Progressing     Problem: Safety - Adult  Goal: Free from fall injury  Outcome: Progressing     Problem: ABCDS Injury Assessment  Goal: Absence of physical injury  Outcome: Progressing     Problem: Neurosensory - Adult  Goal: Achieves stable or improved neurological status  Outcome: Progressing  Flowsheets (Taken 10/6/2022 0850)  Achieves stable or improved neurological status: Assess for and report changes in neurological status  Goal: Absence of seizures  Outcome: Progressing  Flowsheets (Taken 10/6/2022 0850)  Absence of seizures: Monitor for seizure activity.   If seizure occurs, document type and location of movements and any associated apnea  Goal: Remains free of injury related to seizures activity  Outcome: Progressing  Flowsheets (Taken 10/6/2022 0850)  Remains free of injury related to seizure activity: Maintain airway, patient safety  and administer oxygen as ordered  Goal: Achieves maximal functionality and self care  Outcome: Progressing  Flowsheets (Taken 10/6/2022 0850)  Achieves maximal functionality and self care: Monitor swallowing and airway patency with patient fatigue and changes in neurological status     Problem: Musculoskeletal - Adult  Goal: Return mobility to safest level of function  Outcome: Progressing  Flowsheets (Taken 10/6/2022 0850)  Return Mobility to Safest Level of Function: Assess patient stability and activity tolerance for standing, transferring and ambulating with or without assistive devices  Goal: Maintain proper alignment of affected body part  Outcome: Progressing  Flowsheets (Taken 10/6/2022 0850)  Maintain proper alignment of affected body part: Support and protect limb and body alignment per provider's orders  Goal: Return ADL status to a safe level of function  Outcome: Progressing  Flowsheets (Taken 10/6/2022 0850)  Return ADL Status to a Safe Level of Function: Administer medication as ordered     Problem: Genitourinary - Adult  Goal: Absence of urinary retention  Outcome: Progressing  Goal: Urinary catheter remains patent  Outcome: Progressing     Problem: Metabolic/Fluid and Electrolytes - Adult  Goal: Electrolytes maintained within normal limits  Outcome: Progressing  Flowsheets (Taken 10/6/2022 0850)  Electrolytes maintained within normal limits: Monitor labs and assess patient for signs and symptoms of electrolyte imbalances  Goal: Hemodynamic stability and optimal renal function maintained  Outcome: Progressing  Flowsheets (Taken 10/6/2022 0850)  Hemodynamic stability and optimal renal function maintained: Monitor labs and assess for signs and symptoms of volume excess or deficit  Goal: Glucose maintained within prescribed range  Outcome: Progressing  Flowsheets (Taken 10/6/2022 0850)  Glucose maintained within prescribed range: Monitor blood glucose as ordered     Problem: Respiratory - Adult  Goal: Achieves optimal ventilation and oxygenation  Outcome: Progressing  Flowsheets (Taken 10/6/2022 0850)  Achieves optimal ventilation and oxygenation: Assess for changes in respiratory status     Problem: Cardiovascular - Adult  Goal: Maintains optimal cardiac output and hemodynamic stability  Outcome: Progressing  Goal: Absence of cardiac dysrhythmias or at baseline  Outcome: Progressing     Problem: Gastrointestinal - Adult  Goal: Maintains or returns to baseline bowel function  Outcome: Progressing  Flowsheets (Taken 10/6/2022 0850)  Maintains or returns to baseline bowel function: Assess bowel function  Goal: Maintains adequate nutritional intake  Outcome: Progressing  Flowsheets (Taken 10/6/2022 0850)  Maintains adequate nutritional intake: Monitor intake and output, weight and lab values

## 2022-10-06 NOTE — PROGRESS NOTES
Comprehensive Nutrition Assessment    Type and Reason for Visit:  Initial    Nutrition Recommendations/Plan:   Continue NPO  RD to recommend Jevity 1.5 at goal rate 45ml/hr     Malnutrition Assessment:  Malnutrition Status:  Severe malnutrition (10/06/22 1228)    Context:  Chronic Illness     Findings of the 6 clinical characteristics of malnutrition:  Energy Intake:  75% or less estimated energy requirements for 1 month or longer  Weight Loss:  Greater than 20% over 1 year     Body Fat Loss:  Unable to assess     Muscle Mass Loss:  Unable to assess    Fluid Accumulation:  Unable to assess     Strength:  Not Performed    Nutrition Assessment:    LOS assessment. Pt admitted for AMS s/p stroke. Hx MRDD. Pt has lost 34lbs (20%) over the last seven months. SLP following - recommending NPO. RD to recommend tube feeding for nutrition. Nutrition Related Findings:    Disoriented x 4. +BM 10/2. +6.7 liters. Wound Type: None       Current Nutrition Intake & Therapies:    Average Meal Intake: NPO  Average Supplements Intake: NPO  Diet NPO  Recommended Tube Feeding (TF) Orders:  Goal TF & Flush Orders Provides: If EN initiated, RD to recommend Jevity 1.5 @ 45ml/hr to provide 900ml TV, 1350 kcals, 57 grams protein, and 684ml free water. (Calculated x 20 hours to account for routine nursing care)    Anthropometric Measures:  Height: 5' (152.4 cm)  Ideal Body Weight (IBW): 100 lbs (45 kg)    Admission Body Weight: 131 lb (59.4 kg)  Current Body Weight: 136 lb (61.7 kg), 136 % IBW. Weight Source: Bed Scale  Current BMI (kg/m2): 26.6  Weight Adjustment For: No Adjustment  BMI Categories: Overweight (BMI 25.0-29. 9)    Estimated Daily Nutrient Needs:  Energy Requirements Based On: Kcal/kg  Weight Used for Energy Requirements: Current  Energy (kcal/day): 4342-0373 (20-25kcal/62kg)  Weight Used for Protein Requirements: Ideal  Protein (g/day): 54-63 (1.2-1.4g/45kg)  Method Used for Fluid Requirements: 1 ml/kcal  Fluid (ml/day): 1 ml/kcal    Nutrition Diagnosis:   Severe malnutrition related to inadequate protein-energy intake as evidenced by Criteria as identified in malnutrition assessment, NPO or clear liquid status due to medical condition    Nutrition Interventions:   Food and/or Nutrient Delivery: Continue NPO, Start Tube Feeding  Nutrition Education/Counseling: Education not indicated  Coordination of Nutrition Care: Continue to monitor while inpatient     Goals:  Goals: Initiate nutrition support     Nutrition Monitoring and Evaluation:   Behavioral-Environmental Outcomes: None Identified  Food/Nutrient Intake Outcomes: Diet Advancement/Tolerance, Food and Nutrient Intake, Enteral Nutrition Intake/Tolerance, IVF Intake  Physical Signs/Symptoms Outcomes: Meal Time Behavior, Skin, Weight, Fluid Status or Edema, Constipation    Discharge Planning:     Too soon to determine     Mayte Day LD  Contact: [phone]

## 2022-10-06 NOTE — PLAN OF CARE
Problem: Discharge Planning  Goal: Discharge to home or other facility with appropriate resources  10/6/2022 1224 by Adriana Moore RN  Outcome: Progressing  10/6/2022 0934 by Arielle Starkey RN  Outcome: Progressing  Flowsheets (Taken 10/6/2022 3216)  Discharge to home or other facility with appropriate resources: Identify barriers to discharge with patient and caregiver     Problem: Pain  Goal: Verbalizes/displays adequate comfort level or baseline comfort level  10/6/2022 1224 by Adriana Moore RN  Outcome: Progressing  10/6/2022 0934 by Arielle Starkey RN  Outcome: Progressing  10/5/2022 2305 by Mio Tsai RN  Outcome: Progressing     Problem: Skin/Tissue Integrity  Goal: Absence of new skin breakdown  Description: 1. Monitor for areas of redness and/or skin breakdown  2. Assess vascular access sites hourly  3. Every 4-6 hours minimum:  Change oxygen saturation probe site  4. Every 4-6 hours:  If on nasal continuous positive airway pressure, respiratory therapy assess nares and determine need for appliance change or resting period.   10/6/2022 1224 by Adriana Moore RN  Outcome: Progressing  10/6/2022 0934 by Arielle Starkey RN  Outcome: Progressing     Problem: Safety - Adult  Goal: Free from fall injury  10/6/2022 1224 by Adriana Moore RN  Outcome: Progressing  10/6/2022 0934 by Arielle Starkey RN  Outcome: Progressing     Problem: ABCDS Injury Assessment  Goal: Absence of physical injury  10/6/2022 1224 by Adriana Moore RN  Outcome: Progressing  10/6/2022 0934 by Arielle Starkey RN  Outcome: Progressing     Problem: Neurosensory - Adult  Goal: Achieves stable or improved neurological status  10/6/2022 1224 by Adriana Moore RN  Outcome: Progressing  10/6/2022 0934 by Arielle Starkey RN  Outcome: Progressing  Flowsheets (Taken 10/6/2022 0850)  Achieves stable or improved neurological status: Assess for and report changes in neurological status  Goal: Absence of seizures  10/6/2022 1224 by Adriana Moore RN  Outcome: Progressing  10/6/2022 0934 by Rosalba Patel RN  Outcome: Progressing  Flowsheets (Taken 10/6/2022 0850)  Absence of seizures: Monitor for seizure activity.   If seizure occurs, document type and location of movements and any associated apnea  Goal: Remains free of injury related to seizures activity  10/6/2022 1224 by Elvira Chow RN  Outcome: Progressing  10/6/2022 0934 by Rosalba Patel RN  Outcome: Progressing  Flowsheets (Taken 10/6/2022 3465)  Remains free of injury related to seizure activity: Maintain airway, patient safety  and administer oxygen as ordered  Goal: Achieves maximal functionality and self care  10/6/2022 1224 by Elvira Chow RN  Outcome: Progressing  10/6/2022 0934 by Rosalba Patel RN  Outcome: Progressing  Flowsheets (Taken 10/6/2022 0850)  Achieves maximal functionality and self care: Monitor swallowing and airway patency with patient fatigue and changes in neurological status     Problem: Musculoskeletal - Adult  Goal: Return mobility to safest level of function  10/6/2022 1224 by Elvira Chow RN  Outcome: Progressing  10/6/2022 0934 by Rosalba Patel RN  Outcome: Progressing  Flowsheets (Taken 10/6/2022 0850)  Return Mobility to Safest Level of Function: Assess patient stability and activity tolerance for standing, transferring and ambulating with or without assistive devices  Goal: Maintain proper alignment of affected body part  10/6/2022 1224 by Elvira Chow RN  Outcome: Progressing  10/6/2022 0934 by Rosalba Patel RN  Outcome: Progressing  Flowsheets (Taken 10/6/2022 0850)  Maintain proper alignment of affected body part: Support and protect limb and body alignment per provider's orders  Goal: Return ADL status to a safe level of function  10/6/2022 1224 by Elvira Chow RN  Outcome: Progressing  10/6/2022 0934 by Rosalba Patel RN  Outcome: Progressing  Flowsheets (Taken 10/6/2022 0850)  Return ADL Status to a Safe Level of Function: Administer medication as ordered     Problem: Genitourinary - Adult  Goal: Absence of urinary retention  10/6/2022 1224 by Aleks Rodriguez RN  Outcome: Progressing  10/6/2022 0934 by Maynor Carmona RN  Outcome: Progressing  Goal: Urinary catheter remains patent  10/6/2022 1224 by Aleks Rodriguez RN  Outcome: Progressing  10/6/2022 0934 by Maynor Carmona RN  Outcome: Progressing     Problem: Metabolic/Fluid and Electrolytes - Adult  Goal: Electrolytes maintained within normal limits  10/6/2022 1224 by Aleks Rodriguez RN  Outcome: Progressing  10/6/2022 0934 by Maynor Carmona RN  Outcome: Progressing  Flowsheets (Taken 10/6/2022 0850)  Electrolytes maintained within normal limits: Monitor labs and assess patient for signs and symptoms of electrolyte imbalances  Goal: Hemodynamic stability and optimal renal function maintained  10/6/2022 1224 by Aleks Rodriguez RN  Outcome: Progressing  10/6/2022 0934 by Maynor Carmona RN  Outcome: Progressing  Flowsheets (Taken 10/6/2022 0850)  Hemodynamic stability and optimal renal function maintained: Monitor labs and assess for signs and symptoms of volume excess or deficit  Goal: Glucose maintained within prescribed range  10/6/2022 1224 by Aleks Rodriguez RN  Outcome: Progressing  10/6/2022 0934 by Maynor Carmona RN  Outcome: Progressing  Flowsheets (Taken 10/6/2022 0850)  Glucose maintained within prescribed range: Monitor blood glucose as ordered     Problem: Respiratory - Adult  Goal: Achieves optimal ventilation and oxygenation  10/6/2022 1224 by Aleks Rodriguez RN  Outcome: Progressing  10/6/2022 0934 by Maynor Carmona RN  Outcome: Progressing  Flowsheets (Taken 10/6/2022 0850)  Achieves optimal ventilation and oxygenation: Assess for changes in respiratory status     Problem: Cardiovascular - Adult  Goal: Maintains optimal cardiac output and hemodynamic stability  10/6/2022 1224 by Aleks Rodriguez RN  Outcome: Progressing  10/6/2022 0934 by Maynor Carmona RN  Outcome: Progressing  Goal: Absence of cardiac dysrhythmias or at baseline  10/6/2022 1224 by Jatin Hernandez RN  Outcome: Progressing  10/6/2022 0934 by Raymundo Haro RN  Outcome: Progressing     Problem: Gastrointestinal - Adult  Goal: Maintains or returns to baseline bowel function  10/6/2022 1224 by Jatin Hernandez RN  Outcome: Progressing  10/6/2022 0934 by Raymundo Haro RN  Outcome: Progressing  Flowsheets (Taken 10/6/2022 0850)  Maintains or returns to baseline bowel function: Assess bowel function  Goal: Maintains adequate nutritional intake  10/6/2022 1224 by Jatin Hernandez RN  Outcome: Progressing  10/6/2022 0934 by Raymundo Haro RN  Outcome: Progressing  Flowsheets (Taken 10/6/2022 0850)  Maintains adequate nutritional intake: Monitor intake and output, weight and lab values

## 2022-10-06 NOTE — PROGRESS NOTES
[facility]   Speech Therapy  Daily Dysphagia Treatment Note    Yoselyn Neville  AGE: 62 y.o. GENDER: female  : 1965  [id number]  EPISODE DATE:  2022    Patient Active Problem List   Diagnosis    Altered mental status    Hypernatremia    Hypokalemia    GARRETT (acute kidney injury) ([facility] [address]     No Known Allergies    Treatment Diagnosis: Dysphagia     CHART REVIEW:  2022 admitted with c/o AMS  MD ADMISSION H&P HPI:  Michael Lane is 62 y.o. female with history of developmental delay, seizure disorder, stroke, sickle cell disease. Per report, at baseline patient is verbally interactive but since yesterday patient has not been talking. She is brought to the ED from her nursing home for altered mental status. On interview, she does not say a word although she is awake. She is unable to answer any questions to me. She can make eye contact and withdraws from. Work-up reveals severe hypernatremia sodium 159, hypokalemia potassium 2.8 and GARRETT which suggest severe dehydration     NPO ordered 10/1/2022 d/t AMS     10/4/2022 Neuro notes concern for new embolic CVA (L MARCELO, L cerebellar) with additional note for recent stroke in  (L subcortical, L thanh)     IMAGING:  CXR: 2022  Impression   No radiographic evidence of acute cardiopulmonary disease. CT CHEST: 2022  Impression   No evidence of pulmonary embolism or acute pulmonary abnormality. Mild left   lower lobe atelectatic changes. BRAIN MRI: 10/2/2022  Impression   Left MARCELO distribution infarct without hemorrhagic transformation. Mild   corresponding T2 FLAIR hyperintensity is identified. Mild to moderate chronic microvascular disease and involutional changes. Scattered areas of chronic lacunar type infarcts are identified both basal   ganglia regions and thalamic regions and left greater than right side of the   thanh.          DYSPHAGIA HISTORY  Mech soft/thin at [facility] prior to admission  2022 [facility] SLP notes: Assessment: Patient presents with increased risk of oropharyngeal dysphagia secondary to cognitive status and comorbidities. No overt s/s aspiration observed with thin liquids or hard solids. Pt with prolonged and impaired mastication of hard solids, requiring multiple liquid washes. Based on today's assessment, recommend dysphagia 2 diet with thin liquids, meds whole or crushed with puree when awake/alert. Will follow. Subjective:     Current diet: NPO    Comments regarding tolerating Current Diet:   Persistent poor level of responsiveness      Objective:     Pain: did not state; does not appear to present with pain    Cognitive/Behavior   Behavior/Cognition: Alert, Cooperative, Doesn't follow directions, Requires cueing    Positioning: Fully upright in bed    PO Trials: Thin Liquids  Nectar thick liquids  Honey Thick liquids: poor endurance - initiates prep but holds PO trial orally; trial removed via toothette  Puree   Soft food  Regular food    Dysphagia Tx:   Direct Dysphagia tx: PO trials attempted - poor endurance and poor level of responsiveness appear to be barriers to PO safety  Dysphagia ex: unable to follow dx; limited responsiveness to stim  Training in compensatory strategies: NA  Pt response to ex/training: no evidence of patient learning    Goals: The patient will tolerate ongoing dysphagia evaluation (clinical vs instrumental) with additional goals to be determined as appropriate. Continue - rec NPO; poor MBS candidate d/t current status    Assessment:   Impressions:   Patient alert/awake, noted with head turned left in bed with saturated bedding d/t drooling. Patient does not follow dx and does not respond via verbalizations or gestures. Oral care administered with disorganized lingual response, no swallow response, and weak/reduced gag noted. PO trials limited d/t severity of imps: honey thick x1.  Severe oral dysphagia characterized by poor volitional oral prep and poor oral motor strength for bolus formation and movement. Oral holding with presented trial; trial removed via toothette. Severity of oral phase impairments is a barrier to PO intake and safety. Recommend to continue NPO at this time. Concern for charted persistent limited level of responsiveness. Poor prognosis for PO safety suspected d/t severity of current imps unchanged since eval.   It may be beneficial to consider long-term alternate nutrition needs vs review of goals of care d/t persistent poor PO appropriateness and poor prognosis for PO appropriateness. Diet Recommendations:  NPO  Recommended Form of Meds:  (via alterante means (IV/rectal))    Strategies:    Oral care    Education:  Consulted and agree with results and recommendations: Patient, RN  Patient Education: completed on resutls/recs/plan  Patient Education Response: No evidence of learning    Prognosis:   Poor for PO safety d/t persistent severity of oral phase impairments    Plan:     Continue Dysphagia Therapy: YES    Interventions: Therapeutic Interventions: Therapeutic PO trials with SLP, Patient/Family education, Oral care  Duration/Frequency of therapy while on unit: Duration/Frequency of Treatment  Duration of Treatment: ST to tx 3-5 times per week during acute admission    Discharge Instructions:   Anticipate NEED for further skilled Speech Therapy for Dysphagia at discharge pending goals of care    This note serves as a D/C Summary in the event that this patient is discharged prior to the next therapy session.     Coded treatment time: 0  Total treatment time: 15    Electronically signed by OBINNA Cheney on 10/6/2022 at 10:39 AM

## 2022-10-06 NOTE — PROGRESS NOTES
Nephrology ([facility]) Progress Note    CC: hypernatremia and hypokalemia    Subjective:    HPI:  Breathing comfortably. MRDD. Labs noted . ROS:  opens her eyes , does not follow commands . [address]:  medications reviewed. Objective:  Blood pressure (!) 140/90, pulse 97, temperature 98.6 °F (37 °C), temperature source Axillary, resp. rate 18, height 5' (1.524 m), weight 136 lb 0.4 oz (61.7 kg), SpO2 96 %. Intake/Output Summary (Last 24 hours) at 10/6/2022 1202  Last data filed at 10/5/2022 2256  Gross per 24 hour   Intake 4535.7 ml   Output 900 ml   Net 3635.7 ml       General:  resting   Chest:  CTAB  CVS:  RRR  Abdominal:  NTND, soft, +BS  Extremities:  no edema  Skin:  no rash    Labs:  Renal panel:  Lab Results   Component Value Date/Time     10/06/2022 04:41 AM    K 3.5 10/06/2022 04:41 AM    CO2 22 10/06/2022 04:41 AM    BUN 3 (L) 10/06/2022 04:41 AM    CREATININE <0.5 (L) 10/06/2022 04:41 AM    CALCIUM 8.7 10/06/2022 04:41 AM    PHOS 2.8 10/06/2022 04:41 AM    MG 1.70 (L) 10/06/2022 04:41 AM     CBC:  Lab Results   Component Value Date/Time    WBC 5.0 10/06/2022 04:41 AM    HGB 10.7 (L) 10/06/2022 04:41 AM    HCT 33.7 (L) 10/06/2022 04:41 AM     10/06/2022 04:41 AM       Assessment/Plan:  Reviewed old records and labs. 1) hypernatremia   -Na better    - free water deficit resolving .         - IVF adjusted . 2) HTN meds adjusted     3) AMS   - possibly due to hypernatremia, but it should have improved given improvement in Na, and she should not be symptomatic at this level   - neurology consulted   - MRI and MRA pending (patient can receive gadolinium)    4) FEN   - will supp K/Mg . Phos noted.      5) MRDD        Candy Hall MD,FACP

## 2022-10-06 NOTE — PROGRESS NOTES
4 Eyes Skin Assessment     NAME:  Yoselyn Neville  YOB: 1965  MEDICAL RECORD NUMBER:  [id number]    The patient is being assess for  Transfer to New Unit    I agree that 2 RN's have performed a thorough Head to Toe Skin Assessment on the patient. ALL assessment sites listed below have been assessed. Areas assessed by both nurses:    Head, Face, Ears, Shoulders, Back, Chest, Arms, Elbows, Hands, Sacrum. Buttock, Coccyx, Ischium, and Legs. Feet and Heels        Does the Patient have a Wound? Yes wound(s) were present on assessment. LDA wound assessment was Initiated and completed   Open areas on L great and L 2nd toe- gauze placed between toes for protection. Blister to R buttock- mepilex heart placed. Reddened area to R elbow- elbow protectors placed.         Cedric Prevention initiated:  Yes   Wound Care Orders initiated:  No- per wound nurse \"will take a look\"    Pressure Injury (Stage 3,4, Unstageable, DTI, NWPT, and Complex wounds) if present place referral/consult order under [de-identified] No    New and Established Ostomies if present place consult order under : No      Nurse 1 eSignature: Electronically signed by Radha Jain RN on 10/6/22 at 2:30 PM EDT    **SHARE this note so that the co-signing nurse is able to place an eSignature**    Nurse 2 eSignature: Electronically signed by Elvin Whalen RN on 10/6/22 at 2:32 PM EDT

## 2022-10-06 NOTE — FLOWSHEET NOTE
Code stroke. Difficult assessing NIH as patient non-verbal and not following commands at this time - see below. R gaze preference/deviation assessed; all extremities w/d to pain.         10/06/22 1218   NIHSS Stroke Scale   NIHSS Stroke Scale Assessed Yes   Interval Baseline   Level of Consciousness (1a) 2   LOC Questions (1b) 2   LOC Commands (1c) 2   Best Gaze (2) (!) 2  (R gaze preference/deviation.)   Visual (3) 0  (Blinks to threat bilaterally)   Facial Palsy (4) 0  (Upon initial assessment, head laying to left side and patient drooling)   Motor Arm, Left (5a) 0  (WERNER - pt not following commands at this time; extremity w/d to deep nail bed pressure)   Motor Arm, Right (5b) 0  (WERNER - pt not following commands at this time; extremity w/d to deep nail bed pressure)   Motor Leg, Left (6a) 0  (WERNER - pt not following commands at this time; extremity w/d to deep nail bed pressure)   Motor Leg, Right (6b) 0  (WERNER - pt not following commands at this time; extremity w/d to deep nail bed pressure)   Limb Ataxia (7) 0  (WERNER - pt not following commands at this time)   Sensory (8) 0  (BLE/BLE extremities w/d to deep nail bed pressure)   Best Language (9) 2  (Non-verbal att)   Dysarthria (10) 0  (WERNER - pt non-verbal att)   Extinction and Inattention (11) 0  (WERNER - pt not following commands at this time)   Total 10

## 2022-10-06 NOTE — SIGNIFICANT EVENT
Saw patient during code stroke. She reportedly has had decreased responsiveness today. She is not following commands, has right-arce gaze deviation. She has history of recurrent stroke, has been on dual antiplatelet therapies, statin. CT-head ordered earlier today. Added CTA-head/neck. MARLO had already been ordered. Neurology already on board. Transfer to stepdown/stroke unit. D/w Dr. Bianca Jin. D/w stroke team neurologist - also recommended CT and CTA. No further recommendations at this time.       SIGNED: Klever Solomon MD, MPH. 10/6/2022

## 2022-10-06 NOTE — PROGRESS NOTES
Progress Note    Updates  No significant events overnight. Mental status remains poor. Current Facility-Administered Medications:   0.45 % sodium chloride infusion, , IntraVENous, Continuous  levETIRAcetam (KEPPRA) 750 mg in sodium chloride 0.9 % 100 mL IVPB, 750 mg, IntraVENous, Q12H  sodium chloride flush 0.9 % injection 5-40 mL, 5-40 mL, IntraVENous, 2 times per day  sodium chloride flush 0.9 % injection 5-40 mL, 5-40 mL, IntraVENous, PRN  0.9 % sodium chloride infusion, , IntraVENous, PRN  ondansetron (ZOFRAN-ODT) disintegrating tablet 4 mg, 4 mg, Oral, Q8H PRN **OR** ondansetron (ZOFRAN) injection 4 mg, 4 mg, IntraVENous, Q6H PRN  polyethylene glycol (GLYCOLAX) packet 17 g, 17 g, Oral, Daily PRN  acetaminophen (TYLENOL) tablet 650 mg, 650 mg, Oral, Q6H PRN **OR** acetaminophen (TYLENOL) suppository 650 mg, 650 mg, Rectal, Q6H PRN  enoxaparin Sodium (LOVENOX) injection 30 mg, 30 mg, SubCUTAneous, Daily  mirtazapine (REMERON) tablet 7.5 mg, 7.5 mg, Oral, Daily  verapamil (CALAN) tablet 120 mg, 120 mg, Oral, Nightly  thiamine mononitrate tablet 100 mg, 100 mg, Oral, Daily  vitamin B-12 (CYANOCOBALAMIN) tablet 1,000 mcg, 1,000 mcg, Oral, Daily  vitamin D (CHOLECALCIFEROL) tablet 1,000 Units, 1,000 Units, Oral, Daily  clopidogrel (PLAVIX) tablet 75 mg, 75 mg, Oral, Daily  atorvastatin (LIPITOR) tablet 80 mg, 80 mg, Oral, Nightly  aspirin chewable tablet 81 mg, 81 mg, Oral, Daily  melatonin tablet 6 mg, 6 mg, Oral, Nightly  multivitamin 1 tablet, 1 tablet, Oral, Daily  potassium chloride (KLOR-CON M) extended release tablet 40 mEq, 40 mEq, Oral, PRN **OR** potassium bicarb-citric acid (EFFER-K) effervescent tablet 40 mEq, 40 mEq, Oral, PRN **OR** potassium chloride 10 mEq/100 mL IVPB (Peripheral Line), 10 mEq, IntraVENous, PRN    Exam  Blood pressure (!) 140/90, pulse 97, temperature 98.6 °F (37 °C), temperature source Axillary, resp.  rate 18, height 5' (1.524 m), weight 136 lb 0.4 oz (61.7 kg), SpO2 96 %.  Constitutional    Vital signs: BP, HR, and RR reviewed   General no distress. Eyes: unable to visualize the fundi  Cardiovascular: no peripheral edema. Psychiatric: no psychotic behavior noted. Neurologic  Mental status:   orientation non verbal.     Attention poor   Language non verbal.     Comprehension she is not following any commands at this time. Cranial nerves:   CN2: corneal reflexes are present. CN 3,4,6: she has persistent R gaze preference. Pupils do appear to be round and reactive. CN7: face symmetric   CN8: hearing fortino. CN12: fortino. Strength: less movement is noted in her RUE/RLE though not following commands. Sensory: she is reactive to noxious pain stimulus primarily on her L side and to trapezius pressure. When passively moving her head she appears to get upset and start crying. Cerebellar/coordination: finger nose finger fortino. Tone: decreased RUE>RLE. Gait: deferred at this time given clinical condition. ROS - chart reviewed. Afebrile. Labs  Na 140  Mg 1.70    Folate 5.10  B12 - 1724    WBC 5.0K  Hg 10.7  Platelets 201    UA small LE, 15 WBC, 1+ bacteria    Studies  CT head w/o 10/6/22, independently reviewed  Impression   1. Subacute infarct in the left frontal lobe in the distribution of the MARCELO   characterized on recent CT and MRI. 2. On the current exam, there is no pattern of significant edema or mass   effect relating to this infarct. No new area of edema is appreciated. 3. No evidence of hemorrhagic transformation. CTA head/neck 10/6/22, independently reviewed  Impression   1. Occlusion of the proximal left A2 segment. 2. Mid basilar trunk aneurysm, ventrally directed with the base measuring 4   mm in width, 2.5 mm base to apex measurement. 3. Tortuous appearance of the bilateral distal cervical ICAs. 4. Mild-to-moderate luminal irregularity involving the PCAs. 5. Correlate with concerns for underlying arteriopathy/vasculopathy. MRI brain w/o 10/3/22  Impression   Left MARCELO distribution infarct without hemorrhagic transformation. Mild   corresponding T2 FLAIR hyperintensity is identified. Mild to moderate chronic microvascular disease and involutional changes. Scattered areas of chronic lacunar type infarcts are identified both basal   ganglia regions and thalamic regions and left greater than right side of the   thanh. MRA head/neck 10/3/22  No hemodynamically significant stenosis. EEG 10/3/22  Occasional sharp waves in the left temporal region, which appear most likely  consistent with focal epileptiform discharges as well as mild background  slowing and disorganization. No recorded seizures. Repeat EEG 10/5/22  Bilateral, often independent sharp waves in the left greater than right temporal regions. Bitemporal and bifrontal slowing, right somewhat greater than left. Generalized background slowing and disorganization. No recorded seizures. Impression/Recommendations  Embolic appearing stroke (L MARCELO, L cerebellar). She did just have a recent stroke in June (L subcortical, L thanh). Baseline developmental delay. Her neurologic exam remains poor. She is not following commands and has R gaze preference. She does have a hx of seizures. Repeat EEG did not record any seizures. She is on  mg BID. Apparently after my evaluation a stroke alert was called. CT head was w/out any acute findings. CTA head/neck as above. It appears stroke team was involved. No acute interventions were pursued. Regarding her recent embolic strokes I have ordered a MARLO. Would also recommend a Hematology consult to review hypercoagulable studies at [facility] recently. If her mental status remains this poor, consider repeat MRI brain and/or transfer for cvEEG.      Lynda Martínez NP  [address] [place] [address] [address] Neurology    A copy of this note was provided for Dr Mady Castellano MD

## 2022-10-06 NOTE — PROGRESS NOTES
Hospitalist Progress Note      PCP: Alba Goins MD    Date of Admission: 9/29/2022    Subjective: had a code stroke called this am, cont to be minimally responsive    Medications:  Reviewed    Infusion Medications    sodium chloride 50 mL/hr at 10/06/22 0819    sodium chloride 5 mL/hr at 10/05/22 2256     Scheduled Medications    carvedilol  6.25 mg Oral BID WC    potassium chloride  10 mEq IntraVENous Q1H    levETIRAcetam  750 mg IntraVENous Q12H    sodium chloride flush  5-40 mL IntraVENous 2 times per day    enoxaparin  30 mg SubCUTAneous Daily    mirtazapine  7.5 mg Oral Daily    verapamil  120 mg Oral Nightly    vitamin B-1  100 mg Oral Daily    vitamin B-12  1,000 mcg Oral Daily    Vitamin D  1,000 Units Oral Daily    clopidogrel  75 mg Oral Daily    atorvastatin  80 mg Oral Nightly    aspirin  81 mg Oral Daily    melatonin  6 mg Oral Nightly    multivitamin  1 tablet Oral Daily     PRN Meds: ondansetron **OR** ondansetron, polyethylene glycol, sodium chloride flush, sodium chloride, ondansetron **OR** ondansetron, acetaminophen **OR** acetaminophen, potassium chloride **OR** potassium alternative oral replacement **OR** potassium chloride      Intake/Output Summary (Last 24 hours) at 10/6/2022 1803  Last data filed at 10/6/2022 1608  Gross per 24 hour   Intake 4834.2 ml   Output 200 ml   Net 4634.2 ml       Physical Exam Performed:    BP (!) 147/98   Pulse (!) 114   Temp 98.2 °F (36.8 °C) (Axillary)   Resp 20   Ht 5' (1.524 m)   Wt 136 lb 0.4 oz (61.7 kg)   SpO2 96%   BMI 26.57 kg/m²        General appearance: Lethargic, chronically ill appearing. No apparent distress, appears stated age and cooperative. HEENT: Pupils equal, round, and reactive to light. Conjunctivae/corneas clear. Neck: Supple, with full range of motion. No jugular venous distention. Trachea midline. Respiratory:  Normal respiratory effort.  Clear to auscultation, bilaterally without Rales/Wheezes/Rhonchi. Cardiovascular: Regular rate and rhythm with normal S1/S2 without murmurs, rubs or gallops. Abdomen: Soft, non-tender, non-distended with normal bowel sounds. Musculoskeletal: No clubbing, cyanosis or edema bilaterally. Full range of motion without deformity. Skin: Skin color, texture, turgor normal.  No rashes or lesions. Neurologic:  unable to exam  Psychiatric: Lethargic. Capillary Refill: Brisk,< 3 seconds   Peripheral Pulses: +2 palpable, equal bilaterally        Labs:   Recent Labs     10/05/22  0531 10/06/22  0441   WBC 5.6 5.0   HGB 10.5* 10.7*   HCT 32.7* 33.7*    227     Recent Labs     10/04/22  0608 10/05/22  0530 10/06/22  0441    138 140   K 3.6 3.2* 3.5    107 106   CO2 22 22 22   BUN 4* 3* 3*   CREATININE <0.5* <0.5* <0.5*   CALCIUM 8.5 8.5 8.7   PHOS 2.4* 2.5 2.8     No results for input(s): AST, ALT, BILIDIR, BILITOT, ALKPHOS in the last 72 hours. No results for input(s): INR in the last 72 hours. No results for input(s): Marvie Forget in the last 72 hours. Urinalysis:      Lab Results   Component Value Date/Time    NITRU Negative 10/01/2022 04:05 AM    WBCUA 15 10/01/2022 04:05 AM    BACTERIA 1+ 10/01/2022 04:05 AM    RBCUA 1 10/01/2022 04:05 AM    BLOODU Negative 10/01/2022 04:05 AM    SPECGRAV 1.037 10/01/2022 04:05 AM    GLUCOSEU Negative 10/01/2022 04:05 AM       Radiology:  CTA HEAD NECK W CONTRAST   Final Result   1. Occlusion of the proximal left A2 segment. 2. Mid basilar trunk aneurysm, ventrally directed with the base measuring 4   mm in width, 2.5 mm base to apex measurement. 3. Tortuous appearance of the bilateral distal cervical ICAs. 4. Mild-to-moderate luminal irregularity involving the PCAs. 5. Correlate with concerns for underlying arteriopathy/vasculopathy. CT HEAD WO CONTRAST   Final Result   1. Subacute infarct in the left frontal lobe in the distribution of the MARCELO   characterized on recent CT and MRI. 2. On the current exam, there is no pattern of significant edema or mass   effect relating to this infarct. No new area of edema is appreciated. 3. No evidence of hemorrhagic transformation. Findings were discussed with Thompson Edwards RN, at 12:41 pm on 10/6/2022. MRI BRAIN WO CONTRAST   Final Result   Left MARCELO distribution infarct without hemorrhagic transformation. Mild   corresponding T2 FLAIR hyperintensity is identified. Mild to moderate chronic microvascular disease and involutional changes. Scattered areas of chronic lacunar type infarcts are identified both basal   ganglia regions and thalamic regions and left greater than right side of the   thanh. The findings were sent to the Radiology Results [address] at 5:43   pm on 10/3/2022 to be communicated to a licensed caregiver. MRA HEAD WO CONTRAST   Final Result   No definite large vessel occlusion to account for the left MARCELO infarct on the   MRI. MRA NECK WO CONTRAST   Final Result   Negative for hemodynamic stenosis. XR ABDOMEN (KUB) (SINGLE AP VIEW)   Final Result   No radiopaque foreign body is evident. CT HEAD WO CONTRAST   Final Result   1. No acute intracranial bleed. 2. Multifocal age indeterminate lacunar strokes involve right centrum   semiovale, bilateral basal ganglia and left caudate lobe. 3.  White matter hypoattenuation described is typical of microvascular   ischemic disease or as sequela of dysmyelinating/demyelinating processes. 4.  Vascular calcifications are noted reflecting calcific atherosclerosis. CT CHEST PULMONARY EMBOLISM W CONTRAST   Final Result   No evidence of pulmonary embolism or acute pulmonary abnormality. Mild left   lower lobe atelectatic changes. XR CHEST PORTABLE   Final Result   No radiographic evidence of acute cardiopulmonary disease.          XR CHEST PORTABLE    (Results Pending)           Assessment/Plan:    BRANDT/Alan Nuñez 0461 Problems    Diagnosis     Severe malnutrition (HCC) [E43]      Priority: Medium    Altered mental status [R41.82]      Priority: Medium    Hypernatremia [E87.0]      Priority: Medium    Hypokalemia [E87.6]      Priority: Medium    GARRETT (acute kidney injury) ([facility] [address] [N17.9]      Priority: Medium         Acute metabolic encephalopathy - unclear etiology, didn't improve significantly with electrolyte correction. Repeat U/A showed pyuria.  -continue D5W  -nephrology consulted, recs appreciated  -BMP q6h  -neurology consulted, recs appreciated  -check procalcitonin  -check molecular PCR panel including COVID  -MRI Brain without contrast with left MARCELO stroke  -MRA Head and Neck with contrast reviewed  -EEG reviewed, abnormal - cont keppra  -TSH WNL  - discussed with aunt, wants to have PEG tube placed, GI consulted     Acute CVA - MRI reviewed, neurology following. On ASA/plavix/statin.  Check MARLO and consult hem/onc for suspected hypercoag work-up per neuro recs     Pyuria - UTI ruled out after further study  -stop ceftriaxone  -urine culture showed mixed jovon only     Hypernatremia - suspect due to severe dehydration, resolved  -off  -nephrology consulted, recs appreciated  -BMP q6h     Hypokalemia  -replace PRN  -trend and monitor  -nephrology following     GARRETT - resolved  - likely prerenal due to severe dehydration, improved  -continue IVF  -nephrology managing fluids  -avoid nephrotoxic medications  -hold home ACEi  -trend BMP  -checked urine studies     Hx CVA  -continue home meds including DAPT and high intensity statin     Hx seizure disorder  -change to IV Keppra and continue  - EEG reviewed     Developmental delay  Sickle cell disease           DVT Prophylaxis: Lovenox  Diet: Diet NPO  Code Status: Full Code  PT/OT Eval Status: ordered    Naima Rod MD

## 2022-10-06 NOTE — PROGRESS NOTES
While waiting for result of xray to confirm NG placement, pt pulled tube snf out. Reinserted and f/u xray ordered. Call placed to telesitter to obtain camera- will call when camera is available.    Electronically signed by Zena Singh RN on 10/6/22 at 6:56 PM EDT

## 2022-10-06 NOTE — PROGRESS NOTES
Pt transferred from 26 to 5276 after SLP and Neuro felt that pt neuro status declined and code stroke called. Pt is alert but remains non verbal/non communicative at this time. NIH completed as allowed with a score of 13. VS and assessment completed. IV's infiltrated upon transfer- removed and replaced. Telemetry confirmed with [facility].    Electronically signed by Ana Flores RN on 10/6/22 at 2:29 PM EDT

## 2022-10-06 NOTE — PROGRESS NOTES
symptoms occur is emphasized . Medication information will be reviewed at discharge. The notebook also provides education on Stroke community resources and stroke advocacy.     Electronically signed by Electronically signed by Natalie Ballard RN on 10/6/2022 at 2:29 PM

## 2022-10-07 ENCOUNTER — APPOINTMENT (OUTPATIENT)
Dept: GENERAL RADIOLOGY | Age: 57
DRG: 426 | End: 2022-10-07
Payer: MEDICAID

## 2022-10-07 ENCOUNTER — HOSPITAL ENCOUNTER (OUTPATIENT)
Dept: CARDIAC CATH/INVASIVE PROCEDURES | Age: 57
Discharge: HOME | End: 2022-10-07

## 2022-10-07 LAB
ALBUMIN SERPL-MCNC: 2.8 G/DL (ref 3.4–5)
ANION GAP SERPL CALCULATED.3IONS-SCNC: 15 MMOL/L (ref 3–16)
BASOPHILS ABSOLUTE: 0 K/UL (ref 0–0.2)
BASOPHILS RELATIVE PERCENT: 0.7 %
BUN BLDV-MCNC: 4 MG/DL (ref 7–20)
CALCIUM SERPL-MCNC: 8.8 MG/DL (ref 8.3–10.6)
CHLORIDE BLD-SCNC: 105 MMOL/L (ref 99–110)
CHOLESTEROL, TOTAL: 139 MG/DL (ref 0–199)
CO2: 19 MMOL/L (ref 21–32)
CREAT SERPL-MCNC: <0.5 MG/DL (ref 0.6–1.1)
EOSINOPHILS ABSOLUTE: 0.2 K/UL (ref 0–0.6)
EOSINOPHILS RELATIVE PERCENT: 2.7 %
ESTIMATED AVERAGE GLUCOSE: 108.3 MG/DL
GFR AFRICAN AMERICAN: >60
GFR NON-AFRICAN AMERICAN: >60
GLUCOSE BLD-MCNC: 73 MG/DL (ref 70–99)
GLUCOSE BLD-MCNC: 78 MG/DL (ref 70–99)
GLUCOSE BLD-MCNC: 78 MG/DL (ref 70–99)
GLUCOSE BLD-MCNC: 79 MG/DL (ref 70–99)
GLUCOSE BLD-MCNC: 81 MG/DL (ref 70–99)
GLUCOSE BLD-MCNC: 83 MG/DL (ref 70–99)
GLUCOSE BLD-MCNC: 85 MG/DL (ref 70–99)
HBA1C MFR BLD: 5.4 %
HCT VFR BLD CALC: 33.3 % (ref 36–48)
HDLC SERPL-MCNC: 22 MG/DL (ref 40–60)
HEMOGLOBIN: 10.7 G/DL (ref 12–16)
LDL CHOLESTEROL CALCULATED: 97 MG/DL
LYMPHOCYTES ABSOLUTE: 1.1 K/UL (ref 1–5.1)
LYMPHOCYTES RELATIVE PERCENT: 19.6 %
MAGNESIUM: 1.7 MG/DL (ref 1.8–2.4)
MCH RBC QN AUTO: 23.6 PG (ref 26–34)
MCHC RBC AUTO-ENTMCNC: 32.1 G/DL (ref 31–36)
MCV RBC AUTO: 73.5 FL (ref 80–100)
MONOCYTES ABSOLUTE: 0.5 K/UL (ref 0–1.3)
MONOCYTES RELATIVE PERCENT: 9.1 %
NEUTROPHILS ABSOLUTE: 3.9 K/UL (ref 1.7–7.7)
NEUTROPHILS RELATIVE PERCENT: 67.9 %
PDW BLD-RTO: 18.8 % (ref 12.4–15.4)
PERFORMED ON: NORMAL
PHOSPHORUS: 2.7 MG/DL (ref 2.5–4.9)
PLATELET # BLD: 285 K/UL (ref 135–450)
PMV BLD AUTO: 9.2 FL (ref 5–10.5)
POTASSIUM SERPL-SCNC: 3.5 MMOL/L (ref 3.5–5.1)
PRO-BNP: 8480 PG/ML (ref 0–124)
RBC # BLD: 4.53 M/UL (ref 4–5.2)
SODIUM BLD-SCNC: 139 MMOL/L (ref 136–145)
TRIGL SERPL-MCNC: 99 MG/DL (ref 0–150)
VLDLC SERPL CALC-MCNC: 20 MG/DL
WBC # BLD: 5.8 K/UL (ref 4–11)

## 2022-10-07 PROCEDURE — 99211 OFF/OP EST MAY X REQ PHY/QHP: CPT | Performed by: INTERNAL MEDICINE

## 2022-10-07 PROCEDURE — 97166 OT EVAL MOD COMPLEX 45 MIN: CPT

## 2022-10-07 PROCEDURE — 6360000002 HC RX W HCPCS: Performed by: INTERNAL MEDICINE

## 2022-10-07 PROCEDURE — 2580000003 HC RX 258: Performed by: INTERNAL MEDICINE

## 2022-10-07 PROCEDURE — 36415 COLL VENOUS BLD VENIPUNCTURE: CPT

## 2022-10-07 PROCEDURE — 83735 ASSAY OF MAGNESIUM: CPT

## 2022-10-07 PROCEDURE — 80061 LIPID PANEL: CPT

## 2022-10-07 PROCEDURE — 92526 ORAL FUNCTION THERAPY: CPT

## 2022-10-07 PROCEDURE — 6370000000 HC RX 637 (ALT 250 FOR IP): Performed by: INTERNAL MEDICINE

## 2022-10-07 PROCEDURE — 94760 N-INVAS EAR/PLS OXIMETRY 1: CPT

## 2022-10-07 PROCEDURE — 97162 PT EVAL MOD COMPLEX 30 MIN: CPT

## 2022-10-07 PROCEDURE — 99211 OFF/OP EST MAY X REQ PHY/QHP: CPT

## 2022-10-07 PROCEDURE — 6370000000 HC RX 637 (ALT 250 FOR IP)

## 2022-10-07 PROCEDURE — 9990000010 HC NO CHARGE VISIT

## 2022-10-07 PROCEDURE — 97530 THERAPEUTIC ACTIVITIES: CPT

## 2022-10-07 PROCEDURE — 2580000003 HC RX 258: Performed by: NURSE PRACTITIONER

## 2022-10-07 PROCEDURE — 6360000002 HC RX W HCPCS: Performed by: NURSE PRACTITIONER

## 2022-10-07 PROCEDURE — 83036 HEMOGLOBIN GLYCOSYLATED A1C: CPT

## 2022-10-07 PROCEDURE — 71045 X-RAY EXAM CHEST 1 VIEW: CPT

## 2022-10-07 PROCEDURE — 80069 RENAL FUNCTION PANEL: CPT

## 2022-10-07 PROCEDURE — 1200000000 HC SEMI PRIVATE

## 2022-10-07 PROCEDURE — 83880 ASSAY OF NATRIURETIC PEPTIDE: CPT

## 2022-10-07 PROCEDURE — 2060000000 HC ICU INTERMEDIATE R&B

## 2022-10-07 PROCEDURE — 85025 COMPLETE CBC W/AUTO DIFF WBC: CPT

## 2022-10-07 RX ORDER — POTASSIUM CHLORIDE 7.45 MG/ML
10 INJECTION INTRAVENOUS
Status: ACTIVE | OUTPATIENT
Start: 2022-10-07 | End: 2022-10-07

## 2022-10-07 RX ORDER — DIPHENHYDRAMINE HYDROCHLORIDE 50 MG/ML
12.5 INJECTION INTRAMUSCULAR; INTRAVENOUS EVERY 6 HOURS PRN
Status: DISCONTINUED | OUTPATIENT
Start: 2022-10-07 | End: 2022-10-07

## 2022-10-07 RX ORDER — MAGNESIUM SULFATE IN WATER 40 MG/ML
2000 INJECTION, SOLUTION INTRAVENOUS ONCE
Status: COMPLETED | OUTPATIENT
Start: 2022-10-07 | End: 2022-10-07

## 2022-10-07 RX ORDER — TRAMADOL HYDROCHLORIDE 50 MG/1
50 TABLET ORAL EVERY 6 HOURS PRN
Status: DISCONTINUED | OUTPATIENT
Start: 2022-10-07 | End: 2022-10-07

## 2022-10-07 RX ADMIN — Medication 10 MEQ: at 01:44

## 2022-10-07 RX ADMIN — LEVETIRACETAM 750 MG: 100 INJECTION, SOLUTION INTRAVENOUS at 14:00

## 2022-10-07 RX ADMIN — ATORVASTATIN CALCIUM 80 MG: 80 TABLET, FILM COATED ORAL at 21:53

## 2022-10-07 RX ADMIN — SODIUM CHLORIDE: 4.5 INJECTION, SOLUTION INTRAVENOUS at 05:14

## 2022-10-07 RX ADMIN — VERAPAMIL HYDROCHLORIDE 120 MG: 120 TABLET ORAL at 21:53

## 2022-10-07 RX ADMIN — MAGNESIUM SULFATE HEPTAHYDRATE 2000 MG: 40 INJECTION, SOLUTION INTRAVENOUS at 13:20

## 2022-10-07 RX ADMIN — ENOXAPARIN SODIUM 30 MG: 100 INJECTION SUBCUTANEOUS at 11:38

## 2022-10-07 RX ADMIN — Medication 6 MG: at 21:53

## 2022-10-07 RX ADMIN — LEVETIRACETAM 750 MG: 100 INJECTION, SOLUTION INTRAVENOUS at 02:44

## 2022-10-07 ASSESSMENT — PAIN SCALES - WONG BAKER
WONGBAKER_NUMERICALRESPONSE: 0

## 2022-10-07 NOTE — PROGRESS NOTES
Hospitalist Progress Note      PCP: Adelina Espinoza MD    Date of Admission: 9/29/2022    Subjective: pulled out her NG tube yesterday, unable to do MARLO since unable to get a hold of the aunt for consent    Medications:  Reviewed    Infusion Medications    sodium chloride 5 mL/hr at 10/05/22 9586     Scheduled Medications    carvedilol  6.25 mg Oral BID WC    levETIRAcetam  750 mg IntraVENous Q12H    sodium chloride flush  5-40 mL IntraVENous 2 times per day    enoxaparin  30 mg SubCUTAneous Daily    mirtazapine  7.5 mg Oral Daily    verapamil  120 mg Oral Nightly    vitamin B-1  100 mg Oral Daily    vitamin B-12  1,000 mcg Oral Daily    Vitamin D  1,000 Units Oral Daily    [Held by provider] clopidogrel  75 mg Oral Daily    atorvastatin  80 mg Oral Nightly    aspirin  81 mg Oral Daily    melatonin  6 mg Oral Nightly    multivitamin  1 tablet Oral Daily     PRN Meds: polyethylene glycol, sodium chloride flush, sodium chloride, ondansetron **OR** ondansetron, acetaminophen **OR** acetaminophen, potassium chloride **OR** potassium alternative oral replacement **OR** potassium chloride      Intake/Output Summary (Last 24 hours) at 10/7/2022 1757  Last data filed at 10/7/2022 8957  Gross per 24 hour   Intake 100 ml   Output 550 ml   Net -450 ml       Physical Exam Performed:    BP (!) 145/103   Pulse (!) 116   Temp 98.4 °F (36.9 °C) (Axillary)   Resp 17   Ht 5' (1.524 m)   Wt 137 lb 12.6 oz (62.5 kg)   SpO2 97%   BMI 26.91 kg/m²     General appearance: Lethargic, chronically ill appearing. No apparent distress, appears stated age and cooperative. HEENT: Pupils equal, round, and reactive to light. Conjunctivae/corneas clear. Neck: Supple, with full range of motion. No jugular venous distention. Trachea midline. Respiratory:  Normal respiratory effort. Clear to auscultation, bilaterally without Rales/Wheezes/Rhonchi.   Cardiovascular: Regular rate and rhythm with normal S1/S2 without murmurs, rubs or gallops. Abdomen: Soft, non-tender, non-distended with normal bowel sounds. Musculoskeletal: No clubbing, cyanosis or edema bilaterally. Full range of motion without deformity. Skin: Skin color, texture, turgor normal.  No rashes or lesions. Neurologic:  unable to exam  Psychiatric: Lethargic. Capillary Refill: Brisk,< 3 seconds   Peripheral Pulses: +2 palpable, equal bilaterally    Labs:   Recent Labs     10/05/22  0531 10/06/22  0441 10/07/22  0555   WBC 5.6 5.0 5.8   HGB 10.5* 10.7* 10.7*   HCT 32.7* 33.7* 33.3*    227 285     Recent Labs     10/05/22  0530 10/06/22  0441 10/07/22  0555    140 139   K 3.2* 3.5 3.5    106 105   CO2 22 22 19*   BUN 3* 3* 4*   CREATININE <0.5* <0.5* <0.5*   CALCIUM 8.5 8.7 8.8   PHOS 2.5 2.8 2.7     No results for input(s): AST, ALT, BILIDIR, BILITOT, ALKPHOS in the last 72 hours. No results for input(s): INR in the last 72 hours. No results for input(s): Estle Carrow in the last 72 hours. Urinalysis:      Lab Results   Component Value Date/Time    NITRU Negative 10/01/2022 04:05 AM    WBCUA 15 10/01/2022 04:05 AM    BACTERIA 1+ 10/01/2022 04:05 AM    RBCUA 1 10/01/2022 04:05 AM    BLOODU Negative 10/01/2022 04:05 AM    SPECGRAV 1.037 10/01/2022 04:05 AM    GLUCOSEU Negative 10/01/2022 04:05 AM       Radiology:  XR CHEST PORTABLE   Final Result   Nasogastric tube in good position with a non-specific gas pattern. XR CHEST PORTABLE   Final Result   Enteric tube in appropriate position. CTA HEAD NECK W CONTRAST   Final Result   1. Occlusion of the proximal left A2 segment. 2. Mid basilar trunk aneurysm, ventrally directed with the base measuring 4   mm in width, 2.5 mm base to apex measurement. 3. Tortuous appearance of the bilateral distal cervical ICAs. 4. Mild-to-moderate luminal irregularity involving the PCAs. 5. Correlate with concerns for underlying arteriopathy/vasculopathy.          CT HEAD WO CONTRAST   Final Result 1. Subacute infarct in the left frontal lobe in the distribution of the MARCELO   characterized on recent CT and MRI. 2. On the current exam, there is no pattern of significant edema or mass   effect relating to this infarct. No new area of edema is appreciated. 3. No evidence of hemorrhagic transformation. Findings were discussed with Rosalba Patel RN, at 12:41 pm on 10/6/2022. MRI BRAIN WO CONTRAST   Final Result   Left MARCELO distribution infarct without hemorrhagic transformation. Mild   corresponding T2 FLAIR hyperintensity is identified. Mild to moderate chronic microvascular disease and involutional changes. Scattered areas of chronic lacunar type infarcts are identified both basal   ganglia regions and thalamic regions and left greater than right side of the   thanh. The findings were sent to the Radiology Results [address] at 5:43   pm on 10/3/2022 to be communicated to a licensed caregiver. MRA HEAD WO CONTRAST   Final Result   No definite large vessel occlusion to account for the left MARCELO infarct on the   MRI. MRA NECK WO CONTRAST   Final Result   Negative for hemodynamic stenosis. XR ABDOMEN (KUB) (SINGLE AP VIEW)   Final Result   No radiopaque foreign body is evident. CT HEAD WO CONTRAST   Final Result   1. No acute intracranial bleed. 2. Multifocal age indeterminate lacunar strokes involve right centrum   semiovale, bilateral basal ganglia and left caudate lobe. 3.  White matter hypoattenuation described is typical of microvascular   ischemic disease or as sequela of dysmyelinating/demyelinating processes. 4.  Vascular calcifications are noted reflecting calcific atherosclerosis. CT CHEST PULMONARY EMBOLISM W CONTRAST   Final Result   No evidence of pulmonary embolism or acute pulmonary abnormality. Mild left   lower lobe atelectatic changes.          XR CHEST PORTABLE   Final Result   No radiographic evidence of acute cardiopulmonary disease. Assessment/Plan:    Active Hospital Problems    Diagnosis     Severe malnutrition ([facility] [address] [E43]      Priority: Medium    Altered mental status [R41.82]      Priority: Medium    Hypernatremia [E87.0]      Priority: Medium    Hypokalemia [E87.6]      Priority: Medium    GARRETT (acute kidney injury) ([facility] [address] [N17.9]      Priority: Medium          Acute metabolic encephalopathy - unclear etiology, didn't improve significantly with electrolyte correction. Repeat U/A showed pyuria.  -continue D5W  -nephrology consulted, recs appreciated  -BMP q6h  -neurology consulted, recs appreciated  -check procalcitonin  -check molecular PCR panel including COVID  -MRI Brain without contrast with left MARCELO stroke  -MRA Head and Neck with contrast reviewed  -EEG reviewed, abnormal - cont keppra  -TSH WNL  - discussed with aunt, wants to have PEG tube placed, GI consulted  - hold plavix  - NG tube to be placed with restraints since pt pulling out NG, start TF     Acute CVA - MRI reviewed, neurology following. On ASA/plavix/statin.    Ordered MARLO and unable to be done today  consulted hem/onc for suspected hypercoag work-up per neuro recs - no further work-up per hem/onc     Pyuria - UTI ruled out after further study  -stop ceftriaxone  -urine culture showed mixed jovon only     Hypernatremia - suspect due to severe dehydration, resolved  -off IVF  -nephrology consulted, recs appreciated  -BMP q6h     Hypokalemia  -replace PRN  -trend and monitor  -nephrology following     GARRETT - resolved  - likely prerenal due to severe dehydration, improved  -continue IVF  -nephrology managing fluids  -avoid nephrotoxic medications  -hold home ACEi  -trend BMP  -checked urine studies     Hx CVA  -continue home meds including DAPT and high intensity statin     Hx seizure disorder  -change to IV Keppra and continue  - EEG reviewed     Developmental delay  Sickle cell disease           DVT Prophylaxis: Lovenox  Diet: Diet NPO  ADULT TUBE FEEDING; Nasogastric; Standard with Fiber; Continuous; 25; Yes; 10; Q 6 hours; 45; 100; Q 4 hours  Code Status: Full Code  PT/OT Eval Status: ordered    Dispo - cont care, palliative care consulted to address goals of care        Richar Veras MD

## 2022-10-07 NOTE — CONSULTS
Nutrition Note    Consult \"TF ordering and management\". NG placed and starting TF. See previous RD note from 10/6 for full assessment. RD to recommend Jevity 1.5 @ 45ml/hr (Calculated x 20 hours to account for routine nursing care). Maintenance flush while IVF running. Provides: 900ml TV, 1350 kcals, 57 grams protein, and 684ml free water.       Estimated Daily Nutrient Needs:  Energy Requirements Based On: Kcal/kg  Weight Used for Energy Requirements: Current  Energy (kcal/day): 8579-8204 (20-25kcal/62kg)  Weight Used for Protein Requirements: Ideal  Protein (g/day): 54-63 (1.2-1.4g/45kg)  Method Used for Fluid Requirements: 1 ml/kcal  Fluid (ml/day): 1 ml/kcal    Electronically signed by Simin Tello RD, LD on 10/7/22 at 7:42 AM EDT    Contact: [phone]

## 2022-10-07 NOTE — CONSULTS
[facility]  Palliative Care   Consult Note    NAME:  Isaias Archer  MEDICAL RECORD NUMBER:  [id number]  AGE: 62 y.o. GENDER: female  : 1965  TODAY'S DATE:  10/7/2022    Subjective     Reason for Consult:  goals of care  Visit Type: Initial Consult      Isaias Archer is a 62 y.o. female referred by:   [x] Physician     PAST MEDICAL HISTORY  History reviewed. No pertinent past medical history. PAST SURGICAL HISTORY  History reviewed. No pertinent surgical history. FAMILY HISTORY  History reviewed. No pertinent family history. SOCIAL HISTORY       ALLERGIES  No Known Allergies    MEDICATIONS  No current facility-administered medications on file prior to encounter.      Current Outpatient Medications on File Prior to Encounter   Medication Sig Dispense Refill    aspirin 81 MG chewable tablet Take 81 mg by mouth daily      atorvastatin (LIPITOR) 80 MG tablet Take 80 mg by mouth nightly      vitamin D (CHOLECALCIFEROL) 25 MCG (1000 UT) TABS tablet Take 1,000 Units by mouth daily      clopidogrel (PLAVIX) 75 MG tablet Take 75 mg by mouth daily      vitamin B-12 (CYANOCOBALAMIN) 1000 MCG tablet Take 1,000 mcg by mouth daily      ferrous sulfate (IRON 325) 325 (65 Fe) MG tablet Take 325 mg by mouth every other day      levETIRAcetam (KEPPRA) 500 MG tablet Take 500 mg by mouth 2 times daily      lisinopril (PRINIVIL;ZESTRIL) 40 MG tablet Take 40 mg by mouth daily      melatonin 3 MG TABS tablet Take 6 mg by mouth at bedtime      Multiple Vitamin (MULTIVITAMIN ADULT) TABS Take 1 tablet by mouth daily      mirtazapine (REMERON) 7.5 MG tablet Take 7.5 mg by mouth daily For appetite      vitamin B-1 (THIAMINE) 100 MG tablet Take 100 mg by mouth daily      verapamil (CALAN) 120 MG tablet Take 120 mg by mouth at bedtime         Objective         BP (!) 141/95   Pulse 100   Temp 98.6 °F (37 °C) (Axillary)   Resp 17   Ht 5' (1.524 m)   Wt 137 lb 12.6 oz (62.5 kg)   SpO2 98%   BMI 26.91 kg/m²     Code Status: Full Code    Advanced Directives: nothing on file      Assessment        Management and Education    Persons available for education:        [] Self     [] Caregiver       [] Spouse       [] Other Family Member   [x]  Other unable to get in touch with family     Spiritual History:  notified: No    Does the patient have a Primary Care Physician? No    Palliative Performance Scale:  60% [] Ambulation reduced; Significant disease; Can't do hobbies/housework; intake normal or reduced; occasional assist; LOC full/confusion  50% [] Mainly sit/lie; Extensive disease; Can't do any work; Considerable assist; intake normal or reduced; LOC full/confusion  40% [] Mainly in bed; Extensive disease; Mainly assist; intake normal or reduced; occasional assist; LOC full/confusion  30% [] Bed Bound; Extensive disease; Total care; intake reduced; LOC full/confusion  20% [] Bed Bound; Extensive disease; Total care; intake minimal; Drowsy/coma  10% [] Bed Bound; Extensive disease; Total care; Mouth care only; Drowsy/coma  0 [] Death    Palliative Performance Scale 2 weeks prior to hospitalization: %      Level of patient/caregiver understanding able to:        [] Verbalize Understanding   [] Demonstrate Understanding       [] Teach Back       [] Needs Reinforcement     []  Other:      Teaching Time:  0hours  20 minutes     Plan        Social Service Consult Made:  Yes  Assistance filling out Living Will/HPOA:  No      Discharge Plan:  Palliative care orders introduced    Discharge Environment:  [] Hospice Consult Agency:  [] Inpatient Hospice    [] Home with Hospice Care   [] ECF with Hospice  [] ECF skilled care with Hospice to follow   [] Other:    Discussion: Patient admitted from [facility] for AMS. She has history of developmental delay, seizure disorder, stroke and sickle cell anemia. Per chart review she was previously verbally interactive but now she is not answering any questions.   Currently she is not following any commands, attention is poor when speaking to her, remains nonverbal. I have attempted to call her aunt to discuss goals of care no answer, I was able to leave a message, I have called several times. I will continue to follow Ms. Neville's care as needed. Thank you for allowing me to participate in the care of Ms. Neville .      Electronically signed by Rani Latham RN, BSN, [facility] on 10/7/2022 at 2:38 PM  [address] [place] [address]  Office: [phone]

## 2022-10-07 NOTE — PROGRESS NOTES
Occupational Therapy  Facility/Department: [address] PROGRESSIVE CARE  Occupational Therapy Initial Assessment and Tentative D/C      Name: Gabriela Don  : 1965  MRN: [id number]  Date of Service: 10/7/2022    Discharge Recommendations: Gabriela Don scored a 6/24 on the AM-PAC ADL Inpatient form. Current research shows that an AM-PAC score of 17 or less is typically not associated with a discharge to the patient's home setting. If patient discharges prior to next session this note will serve as a discharge summary. Please see below for the latest assessment towards goals. Long Term Care without OT, [facility] [address] with OT (pending improved ability to participate)  OT Equipment Recommendations  Equipment Needed: No       Patient Diagnosis(es): The primary encounter diagnosis was Acute alteration in mental status. Diagnoses of Acute kidney injury ([facility] [address] Hypokalemia, Developmental delay, History of ischemic stroke, and History of seizure disorder were also pertinent to this visit. Past Medical History:  has no past medical history on file. Past Surgical History:  has no past surgical history on file. Assessment   Performance deficits / Impairments: Decreased functional mobility ; Decreased strength;Decreased endurance;Decreased ADL status; Decreased fine motor control;Decreased cognition;Decreased balance;Decreased ROM; Decreased safe awareness;Decreased high-level IADLs;Decreased coordination  Assessment: Gabriela Don is a 62 y.o. female who presents with history given as above with patient coming in from the nursing facility. Nursing here in the ER also tells me patient has been unresponsive for at least 9 hours today with no medicines given. However on prompting, patient does open her eyes, responds to pain, and otherwise very much with altered mental status. Reportedly patient typically is conversive and responsive.   I see in the patient's electronic medical record that she has a history of sickle cell disease, ischemic stroke that appears may be to have happened during the summer 2022, seizure disorder, developmental delay, possible lupus, previous admission for altered mental status, no anticoagulation. On Plavix and aspirin but no other anticoagulation. I do not see history of DVT or PE. Pt currently minimally interactive. Pt lethargic but with eyes open and inconsistently tracking with gaze. Pt unable to follow commands although some inconsistent hand grasping with L hand. R UE appears to be flaccid. Pt requires Total Ax2 for bed mobility and sitting EOB with Total A. Anticipate pt needing up to Total A for all ADLs. Will trial therapy with pt in hopes of increased participation with time. Prognosis: Guarded;Poor  Decision Making: Medium Complexity  Exam: see above  REQUIRES OT FOLLOW-UP: Yes  Activity Tolerance  Activity Tolerance: Treatment limited secondary to decreased cognition;Patient limited by fatigue        Plan   Occupational Therapy Plan  Times Per Week: 1-2x  Current Treatment Recommendations: Endurance training, Functional mobility training, Balance training, Strengthening, ROM, Neuromuscular re-education, Cognitive reorientation, Safety education & training, Self-Care / ADL, Patient/Caregiver education & training     Restrictions  Restrictions/Precautions  Restrictions/Precautions: Fall Risk    Subjective   General  Chart Reviewed: Yes  Patient assessed for rehabilitation services?: Yes  Additional Pertinent Hx: per ED note, Jesse Rodrigues is a 62 y.o. female who presents with history given as above with patient coming in from the nursing facility. Nursing here in the ER also tells me patient has been unresponsive for at least 9 hours today with no medicines given. However on prompting, patient does open her eyes, responds to pain, and otherwise very much with altered mental status. Reportedly patient typically is conversive and responsive.   I see in the patient's electronic medical record that she has a history of sickle cell disease, ischemic stroke that appears may be to have happened during the summer 2022, seizure disorder, developmental delay, possible lupus, previous admission for altered mental status, no anticoagulation. On Plavix and aspirin but no other anticoagulation. I do not see history of DVT or PE. Family / Caregiver Present: No  Referring Practitioner: Paolo Jaffe MD  Subjective  Subjective: Pt non verbal throughout. No signs of pain. General Comment  Comments: okay for therapy per RN. Social/Functional History          Objective   Heart Rate: (!) 111  Heart Rate Source: Monitor  BP: (!) 164/111  BP Location: Right Arm  BP Method: Automatic  Patient Position: Supine  MAP (Calculated): 128.67  Resp: 18  SpO2: 96 %  O2 Device: None (Room air)             Safety Devices  Type of Devices: Patient at risk for falls; Left in bed;Bed alarm in place;Call light within reach;Nurse notified (Simultaneous filing. User may not have seen previous data.)  Restraints  Restraints Initially in Place: No  Bed Mobility Training  Bed Mobility Training: Yes  Overall Level of Assistance: Assist X2;Total assistance  Supine to Sit: Assist X2;Total assistance  Sit to Supine: Total assistance;Assist X2  Scooting:  Total assistance;Assist X2  Balance  Sitting: Impaired (total A)  Transfer Training  Transfer Training:  (use of maxi move for all transfers)     AROM:  (minimal noted AROM in L UE, unable to move to command; R UE appears flaccid)  Strength: Grossly decreased, non-functional  Coordination: Grossly decreased, non-functional  ADL  Grooming: Dependent/Total (assist to wash face)  LE Dressing: Dependent/Total (assist to don bilateral socks)  Additional Comments: Anticipate pt needing Total A for ADLs based on ROM, strength, balance, and cognition     Activity Tolerance  Activity Tolerance: Treatment limited secondary to decreased cognition  Activity Tolerance Comments: Pt with increasing alertness throughout session. Shows intermittent ability to follow commands on L hand with squeezing. Improved abiliity to track therapist moving side to side. 1 instance of pt trying to speak, with minimal mouth movement but small sound occurred. Hearing  Hearing:  (unable to assess; appears adeqaute for assessment needs)  Cognition  Overall Cognitive Status: Exceptions  Arousal/Alertness: Unresponsive to stimuli  Following Commands: Does not follow commands; Inconsistently follows commands  Orientation  Overall Orientation Status:  (WERNER)                  Education Given To: Patient  Education Provided: Role of Therapy;Plan of Care;ADL Adaptive Strategies;Transfer Training  Education Method: Verbal;Demonstration  Barriers to Learning: Cognition  Education Outcome: Continued education needed          AM-PAC Score        AM-PAC Inpatient Daily Activity Raw Score: 6 (10/07/22 0851)  AM-PAC Inpatient ADL T-Scale Score : 17.07 (10/07/22 0851)  ADL Inpatient CMS 0-100% Score: 100 (10/07/22 0851)  ADL Inpatient CMS G-Code Modifier : CN (10/07/22 0851)    Tinneti Score       Goals  Short Term Goals  Time Frame for Short Term Goals: prior to D/C  Short Term Goal 1: complete bed mobility with Max A  Short Term Goal 2: complete grooming with Max A  Short Term Goal 3: complete UE exercises x5 reps  Short Term Goal 4: pt will sit EOB with Max A for increased ability to complete mobility  Long Term Goals  Time Frame for Long Term Goals : STG=LTG  Patient Goals   Patient goals : no stated goals       Therapy Time   Individual Concurrent Group Co-treatment   Time In 0805         Time Out 0845         Minutes 40         Timed Code Treatment Minutes: 25 Minutes (15 minute eval)       Dann Eden OTR/L

## 2022-10-07 NOTE — CONSULTS
[facility]    Consult Note  PT seen at 845 am 10/6     Requesting Physician: Asuncion Menjivar to offer       HISTORY OF PRESENT ILLNESS:      Ms. Vance Ojeda  is a 62 y.o. female we are seeing in consultation for review of cause of stroke while on aspirin and plavix  The pt is in bed gazing in one direction and does not follow commands   SHe was non verbal wt no purposeful interaction   Thus all hx is via chart review  She was brought in for mental status changes less talking   She had electrolyte anomalies   MRI showed astroke  [facility] [place]   Pt had a full hypercoag workup at [facility] In July that was essentially unremarkabl\e  She did have a high factor 8 level   She had a mildly elevated cardiolipin igm at 25 but this is non specific   Protein sc, at III leiden all neg         Past Medical History:    Per chart review  Seizure   Dev delay     History reviewed. No pertinent past medical history. Past Surgical History:    History reviewed. No pertinent surgical history.   Unknown   Current Medications:    Current Facility-Administered Medications: carvedilol (COREG) tablet 6.25 mg, 6.25 mg, Oral, BID WC  ondansetron (ZOFRAN-ODT) disintegrating tablet 4 mg, 4 mg, Oral, Q8H PRN **OR** ondansetron (ZOFRAN) injection 4 mg, 4 mg, IntraVENous, Q6H PRN  polyethylene glycol (GLYCOLAX) packet 17 g, 17 g, Oral, Daily PRN  0.45 % sodium chloride infusion, , IntraVENous, Continuous  levETIRAcetam (KEPPRA) 750 mg in sodium chloride 0.9 % 100 mL IVPB, 750 mg, IntraVENous, Q12H  sodium chloride flush 0.9 % injection 5-40 mL, 5-40 mL, IntraVENous, 2 times per day  sodium chloride flush 0.9 % injection 5-40 mL, 5-40 mL, IntraVENous, PRN  0.9 % sodium chloride infusion, , IntraVENous, PRN  ondansetron (ZOFRAN-ODT) disintegrating tablet 4 mg, 4 mg, Oral, Q8H PRN **OR** ondansetron (ZOFRAN) injection 4 mg, 4 mg, IntraVENous, Q6H PRN  acetaminophen (TYLENOL) tablet 650 mg, 650 mg, Oral, Q6H PRN **OR** acetaminophen (TYLENOL) suppository 650 mg, 650 mg, Rectal, Q6H PRN  enoxaparin Sodium (LOVENOX) injection 30 mg, 30 mg, SubCUTAneous, Daily  mirtazapine (REMERON) tablet 7.5 mg, 7.5 mg, Oral, Daily  verapamil (CALAN) tablet 120 mg, 120 mg, Oral, Nightly  thiamine mononitrate tablet 100 mg, 100 mg, Oral, Daily  vitamin B-12 (CYANOCOBALAMIN) tablet 1,000 mcg, 1,000 mcg, Oral, Daily  vitamin D (CHOLECALCIFEROL) tablet 1,000 Units, 1,000 Units, Oral, Daily  clopidogrel (PLAVIX) tablet 75 mg, 75 mg, Oral, Daily  atorvastatin (LIPITOR) tablet 80 mg, 80 mg, Oral, Nightly  aspirin chewable tablet 81 mg, 81 mg, Oral, Daily  melatonin tablet 6 mg, 6 mg, Oral, Nightly  multivitamin 1 tablet, 1 tablet, Oral, Daily  potassium chloride (KLOR-CON M) extended release tablet 40 mEq, 40 mEq, Oral, PRN **OR** potassium bicarb-citric acid (EFFER-K) effervescent tablet 40 mEq, 40 mEq, Oral, PRN **OR** potassium chloride 10 mEq/100 mL IVPB (Peripheral Line), 10 mEq, IntraVENous, PRN  Allergies:  Patient has no known allergies. Social History: unobtainable      Social History     Socioeconomic History    Marital status: Single     Spouse name: Not on file    Number of children: Not on file    Years of education: Not on file    Highest education level: Not on file   Occupational History    Not on file   Tobacco Use    Smoking status: Not on file    Smokeless tobacco: Not on file   Substance and Sexual Activity    Alcohol use: Not on file    Drug use: Not on file    Sexual activity: Not on file   Other Topics Concern    Not on file   Social History Narrative    Not on file     Social Determinants of Health     Financial Resource Strain: Not on file   Food Insecurity: Not on file   Transportation Needs: Not on file   Physical Activity: Not on file   Stress: Not on file   Social Connections: Not on file   Intimate Partner Violence: Not on file   Housing Stability: Not on file          Family History:   not obtainable   History reviewed. No pertinent family history. REVIEW OF SYSTEMS:    Rnot obtainable e     PHYSICAL EXAM:      Vitals:  BP (!) 149/89   Pulse (!) 110   Temp 98.4 °F (36.9 °C) (Axillary)   Resp 22   Ht 5' (1.524 m)   Wt 136 lb 0.4 oz (61.7 kg)   SpO2 96%   BMI 26.57 kg/m²     CONSTITUTIONAL:  awake staring t one side no command following   EYES:  pupils equal, round and reactive to light, extra ocular muscles intact, sclera clear, conjunctiva normal  NECK:  Supple, symmetrical, trachea midline, no adenopathy,  HEMATOLOGIC/LYMPHATICS:  no cervical lymphadenopathy, no supraclavicular lymphadenopathy,   LUNGS:  No increased work of breathing, good air exchange, clear to auscultation bilaterally, no crackles or wheezing  CARDIOVASCULAR:  , regular rate and rhythm, normal S1 and S2, no S3 or S4, and no murmur noted  ABDOMEN: soft non tender     NEUROLOGIC: not cooperating to perform   Gaze in one direction   Aphasic   NO command following   Reflexes defered       DATA:    PT/INR:  No results for input(s): PROT, INR in the last 72 hours. PTT:  No results for input(s): APTT in the last 72 hours.   CMP:    Lab Results   Component Value Date/Time     10/06/2022 04:41 AM    K 3.5 10/06/2022 04:41 AM     10/06/2022 04:41 AM    CO2 22 10/06/2022 04:41 AM    BUN 3 10/06/2022 04:41 AM    PROT 6.4 09/30/2022 11:34 AM     Magnesium:    Lab Results   Component Value Date/Time    MG 1.70 10/06/2022 04:41 AM     Phosphorus:  No components found for: PO4  Calcium:  No results found for: CA  CBC:    Lab Results   Component Value Date/Time    WBC 5.0 10/06/2022 04:41 AM    RBC 4.58 10/06/2022 04:41 AM    HGB 10.7 10/06/2022 04:41 AM    HCT 33.7 10/06/2022 04:41 AM    MCV 73.7 10/06/2022 04:41 AM    RDW 18.9 10/06/2022 04:41 AM     10/06/2022 04:41 AM     DIFF:    Lab Results   Component Value Date/Time    MCV 73.7 10/06/2022 04:41 AM    RDW 18.9 10/06/2022 04:41 AM      LDH:  @labcrnt(LDH)@  Uric Acid: @labcrnt(URIC)@    Radiology Review: XR ABDOMEN (KUB) (SINGLE AP VIEW)    Result Date: 10/3/2022  EXAMINATION: ONE SUPINE XRAY VIEW(S) OF THE ABDOMEN 10/3/2022 10:00 am COMPARISON: None. HISTORY: ORDERING SYSTEM PROVIDED HISTORY: needs MRI, scan for metal TECHNOLOGIST PROVIDED HISTORY: Reason for exam:->needs MRI, scan for metal Reason for Exam: needs MRI, scan for metal FINDINGS: EKG leads are present. No radiopaque foreign body is identified. Bowel gas pattern is normal.  No acute osseous abnormality. No radiopaque foreign body is evident. CT HEAD WO CONTRAST    Result Date: 10/6/2022  EXAMINATION: CT OF THE HEAD WITHOUT CONTRAST  10/6/2022 12:30 pm TECHNIQUE: CT of the head was performed without the administration of intravenous contrast. Automated exposure control, iterative reconstruction, and/or weight based adjustment of the mA/kV was utilized to reduce the radiation dose to as low as reasonably achievable. COMPARISON: 09/29/2022 CT and 10/03/2022 MRI HISTORY: ORDERING SYSTEM PROVIDED HISTORY: Rule out stoke TECHNOLOGIST PROVIDED HISTORY: Reason for exam:->Rule out stoke Has a \"code stroke\" or \"stroke alert\" been called? ->No Reason for Exam: ro stroke FINDINGS: BRAIN/VENTRICLES: Based upon recent MRI, there is a known acute infarct in the left parasagittal frontal lobe in the distribution of the MARCELO. On current CT, there is no evidence of hemorrhagic transformation. Relatively similar pattern of edema of the frontal lobe with no sulcal effacement or significant progression. Otherwise, there is chronic generalized atrophy with prominence of the sulci and ventricles. Cavum septum flu syndrome noted incidentally. No new area of edema. No mass lesion on this noncontrast exam. ORBITS: The visualized portion of the orbits demonstrate no acute abnormality. SINUSES: The visualized paranasal sinuses and mastoid air cells demonstrate no acute abnormality.  SOFT TISSUES/SKULL:  No acute abnormality of the visualized skull or soft tissues. 1. Subacute infarct in the left frontal lobe in the distribution of the MARCELO characterized on recent CT and MRI. 2. On the current exam, there is no pattern of significant edema or mass effect relating to this infarct. No new area of edema is appreciated. 3. No evidence of hemorrhagic transformation. Findings were discussed with Shiv Garcia RN, at 12:41 pm on 10/6/2022. CT HEAD WO CONTRAST    Result Date: 9/29/2022  EXAMINATION: CT OF THE HEAD WITHOUT CONTRAST  9/29/2022 8:06 pm TECHNIQUE: CT of the head was performed without the administration of intravenous contrast. Automated exposure control, iterative reconstruction, and/or weight based adjustment of the mA/kV was utilized to reduce the radiation dose to as low as reasonably achievable. COMPARISON: None. HISTORY: ORDERING SYSTEM PROVIDED HISTORY: altered mental status Has a \"code stroke\" or \"stroke alert\" been called? ->No Decision Support Exception - unselect if not a suspected or confirmed emergency medical condition->Emergency Medical Condition (MA) FINDINGS: BRAIN/VENTRICLES: There is no acute intracranial hemorrhage, mass effect or midline shift. No abnormal extra-axial fluid collection. The gray-white differentiation is maintained without evidence of an acute infarct. There is prominence of the ventricles and sulci due to global parenchymal volume loss. There are nonspecific areas of hypoattenuation within the periventricular and subcortical white matter, which likely represent chronic microvascular ischemic change. Multifocal age indeterminate lacunar strokes involve right centrum semiovale, bilateral basal ganglia and left caudate lobe. ORBITS: The visualized portion of the orbits demonstrate no acute abnormality. SINUSES: The visualized paranasal sinuses and mastoid air cells demonstrate no acute abnormality. SOFT TISSUES/SKULL: No acute abnormality of the visualized skull or soft tissues.  Vascular unchanged. The pulmonary vessels are normal.  There is a nasogastric tube in place with the tip in the proximal to mid stomach and the side port seen in the proximal stomach. The gas pattern is unremarkable. Nasogastric tube in good position with a non-specific gas pattern. XR CHEST PORTABLE    Result Date: 10/6/2022  EXAMINATION: ONE XRAY VIEW OF THE CHEST 10/6/2022 5:17 pm COMPARISON: 29 September 2022 HISTORY: ORDERING SYSTEM PROVIDED HISTORY: NG placement TECHNOLOGIST PROVIDED HISTORY: Reason for exam:->NG placement Reason for Exam: NG placement FINDINGS: Enteric tube has been placed with proximal side-port at the gastric cardia and tip in the gastric body. No dilated loops of gas-filled small or large bowel to suggest bowel obstruction. Enteric tube in appropriate position. XR CHEST PORTABLE    Result Date: 9/29/2022  EXAMINATION: ONE XRAY VIEW OF THE CHEST 9/29/2022 6:48 pm COMPARISON: None. HISTORY: ORDERING SYSTEM PROVIDED HISTORY: altered mental status TECHNOLOGIST PROVIDED HISTORY: Reason for exam:->altered mental status Reason for Exam: ams FINDINGS: The cardiomediastinal and hilar silhouettes appear unremarkable. The lungs appear clear. No pleural effusion evident. No pneumothorax is seen. No acute osseous abnormality is identified. Sequela from ORIF bilateral clavicular fractures with fixation plates and screws. No radiographic evidence of acute cardiopulmonary disease. CT CHEST PULMONARY EMBOLISM W CONTRAST    Result Date: 9/30/2022  EXAMINATION: CTA OF THE CHEST, 9/29/2022 8:06 pm TECHNIQUE: CTA of the chest was performed after the administration of intravenous contrast.  Multiplanar reformatted images are provided for review. MIP images are provided for review. Automated exposure control, iterative reconstruction, and/or weight based adjustment of the mA/kV was utilized to reduce the radiation dose to as low as reasonably achievable.  COMPARISON: None HISTORY: ORDERING SYSTEM PROVIDED HISTORY:  Tachycardia, altered mental status, history of autoimmune disease. TECHNOLOGIST PROVIDED HISTORY: Reason for Exam:  Tachycardia, altered mental status, history of autoimmune disease. Decision Support Exception - unselect if not a suspected or confirmed emergency medical condition->Emergency Medical Condition (MA) Reason for Exam:  Tachycardia, altered mental status, history of autoimmune disease. FINDINGS: Pulmonary Arteries: Pulmonary arteries are adequately opacified for evaluation. No evidence of intraluminal filling defect to suggest pulmonary embolism. Main pulmonary artery is normal in caliber. Mediastinum: No evidence of mediastinal lymphadenopathy. The heart is prominent but no pericardial disease. There is no acute abnormality of the thoracic aorta. Lungs/pleura: The lungs are without acute process. No focal consolidation or pulmonary edema. Left lower lobe atelectatic changes and mild loss of volume. No evidence of pleural effusion or pneumothorax. Upper Abdomen: Limited images of the upper abdomen are unremarkable. Soft Tissues/Bones: No acute bone or soft tissue abnormality. No evidence of pulmonary embolism or acute pulmonary abnormality. Mild left lower lobe atelectatic changes. CTA HEAD NECK W CONTRAST    Result Date: 10/6/2022  EXAMINATION: CTA OF THE HEAD AND NECK WITH CONTRAST 10/6/2022 12:32 pm: TECHNIQUE: CTA of the head and neck was performed with the administration of intravenous contrast. Multiplanar reformatted images are provided for review. MIP images are provided for review. Stenosis of the internal carotid arteries measured using NASCET criteria. Automated exposure control, iterative reconstruction, and/or weight based adjustment of the mA/kV was utilized to reduce the radiation dose to as low as reasonably achievable. This scan was analyzed using Viz. ai contact LVO.  Identification of suspected findings is not for diagnostic use beyond notification. Viz LVO is limited to analysis of imaging data and should not be used in-lieu of full patient evaluation or relied upon to make or confirm diagnosis. COMPARISON: Concurrent head CT, MR brain 10/03/2022 HISTORY: ORDERING SYSTEM PROVIDED HISTORY: stroke FINDINGS: CTA NECK: AORTIC ARCH/ARCH VESSELS: No dissection or arterial injury. No significant stenosis of the brachiocephalic or subclavian arteries. CAROTID ARTERIES: No dissection, arterial injury, or hemodynamically significant stenosis by NASCET criteria. Tortuous appearance of the distal cervical ICAs. VERTEBRAL ARTERIES: No dissection, arterial injury, or significant stenosis. SOFT TISSUES: The lung apices are clear. No cervical or superior mediastinal lymphadenopathy. The larynx and pharynx are unremarkable. No acute abnormality of the salivary and thyroid glands. BONES: No acute osseous abnormality. CTA HEAD: ANTERIOR CIRCULATION: Minimal atherosclerotic calcification throughout the carotid siphons without high-grade stenosis or occlusion. Proximal MCAs are patent without high-grade stenosis or occlusion. There is occlusion of the proximal left A2 segment (series 2, image 198). POSTERIOR CIRCULATION: Mild-to-moderate luminal narrowing involving the proximal to mid right P2 segment. Moderate luminal narrowing involving the mid to distal left P2 segment. The intracranial vertebral arteries and the basilar artery are patent without high-grade stenosis or occlusion. 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 OTHER: No dural venous sinus thrombosis on this non-dedicated study. BRAIN: See concurrent head CT. 1. Occlusion of the proximal left A2 segment. 2. Mid basilar trunk aneurysm, ventrally directed with the base measuring 4 mm in width, 2.5 mm base to apex measurement. 3. Tortuous appearance of the bilateral distal cervical ICAs.  4. Mild-to-moderate luminal irregularity involving the PCAs. 5. Correlate with concerns for underlying arteriopathy/vasculopathy. MRA NECK WO CONTRAST    Result Date: 10/3/2022  EXAMINATION: MRA OF THE NECK WITHOUT CONTRAST 10/3/2022 2:32 pm TECHNIQUE: Multiplanar multisequence MRA of the neck was performed without the administration of intravenous contrast. Stenosis of the internal carotid arteries measured using NASCET criteria. COMPARISON: None. HISTORY: ORDERING SYSTEM PROVIDED HISTORY: stroke sickle cell TECHNOLOGIST PROVIDED HISTORY: Reason for exam:->stroke sickle cell Reason for Exam: Stroke, sickle cell FINDINGS: AORTIC ARCH/ARCH VESSELS: No high-grade stenosis of the brachiocephalic or subclavian arteries. CAROTID ARTERIES: No hemodynamically significant stenosis by NASCET criteria. VERTEBRAL ARTERIES: No significant stenosis. Negative for hemodynamic stenosis. MRI BRAIN WO CONTRAST    Result Date: 10/4/2022  EXAMINATION: MRI OF THE BRAIN WITHOUT CONTRAST,  10/3/2022 2:32 pm TECHNIQUE: Multiplanar multisequence MRI of the brain was performed without the administration of intravenous contrast. COMPARISON: CT head 09/29/2022 HISTORY: ORDERING SYSTEM PROVIDED HISTORY:  Stroke, AMS TECHNOLOGIST PROVIDED HISTORY: Reason for Exam:  Stroke, AMS Reason for Exam:  Stroke FINDINGS: INTRACRANIAL STRUCTURES/VENTRICLES: There is a left MARCELO distribution infarct. Motion degradation challenges evaluation. There is mild corresponding T2 FLAIR hyperintensity corresponding to the left MARCELO distribution infarct. No evidence of petechial blood products to suggest hemorrhagic transformation. Major flow voids preserved. Involutional changes. There are bilateral chronic lacunar type infarcts identified both basal ganglia and thalamic regions. Left greater than right pontine areas remote stroke is identified as well. No shift of midline structure. Basal cisterns are patent.  ORBITS: The visualized portion of the orbits demonstrate no acute abnormality. SINUSES: Mild-to-moderate paranasal sinus disease. BONES/SOFT TISSUES: The bone marrow signal intensity appears normal. The soft tissues demonstrate no acute abnormality. Left MARCELO distribution infarct without hemorrhagic transformation. Mild corresponding T2 FLAIR hyperintensity is identified. Mild to moderate chronic microvascular disease and involutional changes. Scattered areas of chronic lacunar type infarcts are identified both basal ganglia regions and thalamic regions and left greater than right side of the thanh. The findings were sent to the Radiology Results [address] at 5:43 pm on 10/3/2022 to be communicated to a licensed caregiver.          Problem List  Patient Active Problem List   Diagnosis    Altered mental status    Hypernatremia    Hypokalemia    GARRETT (acute kidney injury) ([facility] [address]    Severe malnutrition (HCC)       IMPRESSION/RECOMMENDATIONS:  CVA on MRI while on aspirin/iplavix  PT also being worked up for seizures etc  HEr overall situation currently appears quite debilitated   I did review the labs from  done in July   THe only abnormal test was elevvated factor 8 level which can be an acute phase reactant and thus hard to interpret the meaning-as it can be consdiered a hypercoag state but science behind managing is unclear  SHe does not have antiphos ab syndrome based on lab review-she had a mildly elevated cardiolipin igm but this I non specific   I do no recommend repeating these studies  Management of anticoagulation choice is deferred to neurology if they choose to switch to noac I feel this Is more in neurology realm rec over hematology   I have no other recs Will sign off

## 2022-10-07 NOTE — PROGRESS NOTES
Physical Therapy  Facility/Department: [facility] 5N PROGRESSIVE CARE  Physical Therapy Initial Assessment    Name: Isa Ngo  : 1965  MRN: [id number]  Date of Service: 10/7/2022    Discharge Recommendations:  Continue to assess pending progress, Long Term Care with PT, Long Term Care without PT      Yoselyn Neville scored a 6/24 on the AM-PAC short mobility form. Please see assessment section for further patient specific details. If patient discharges prior to next session this note will serve as a discharge summary. Please see below for the latest assessment towards goals. Patient Diagnosis(es): The primary encounter diagnosis was Acute alteration in mental status. Diagnoses of Acute kidney injury ([facility] [address] Hypokalemia, Developmental delay, History of ischemic stroke, and History of seizure disorder were also pertinent to this visit. Past Medical History:  has no past medical history on file. Past Surgical History:  has no past surgical history on file. Assessment   Body Structures, Functions, Activity Limitations Requiring Skilled Therapeutic Intervention: Decreased functional mobility ; Decreased strength;Decreased endurance;Decreased balance  Assessment: Ms. Alice Burnham  is a 62 y.o. female who was initially admitted due to mental status changes and less talking. Pt with hx of stroke in  and found to have another was admitted. Unsure of past functional mobility status. Today, 10/7, pt with initially decreased alertness but eventually opened her eyes and was able to track therapist slightly. Pt primarily looks to R. Pt without signs of R UE or LE purposeful movement, but able to squeeze L hand on command intermittently. Pt with one instance of attempting to speak, with an inaudible sound occurring. Pt totalA for bed mobility, requiring maxA x2 and maxA for sitting balance on EOB. At this time, anticipate pt would benefit from LTC with/without PT services based on pt's PLOF.  Will cont to assess and see while in acute care. Treatment Diagnosis: decreased strength  Therapy Prognosis: Guarded; Fair  Decision Making: Medium Complexity  History: See below  Exam: See below  Clinical Presentation: Evolving  Barriers to Learning: Cog  Activity Tolerance  Activity Tolerance: Treatment limited secondary to decreased cognition  Activity Tolerance Comments: Pt with increasing alertness throughout session. Shows intermittent ability to follow commands on L hand with squeezing. Improved abiliity to track therapist moving side to side. 1 instance of pt trying to speak, with minimal mouth movement but small sound occurred. Plan   Physcial Therapy Plan  General Plan: 2 times a day 7 days a week  Current Treatment Recommendations: Transfer training, Functional mobility training, Balance training  Safety Devices  Type of Devices: Patient at risk for falls, Left in bed, Bed alarm in place, Call light within reach, Nurse notified (Simultaneous filing. User may not have seen previous data.)  Restraints  Restraints Initially in Place: No     Restrictions  Restrictions/Precautions  Restrictions/Precautions: Fall Risk     Subjective   General  Patient assessed for rehabilitation services?: Yes  Response To Previous Treatment: Not applicable  Family / Caregiver Present: No  Referring Practitioner: Richard Mena MD  Referral Date : 10/06/22  Diagnosis: AMS, CVA  Follows Commands: Within Functional Limits  Subjective  Subjective: Pt asleep in bed upon arrival. Pt with her eyes shut nitially despite RN getting vitals and tactile and verbal commands. When flattening bed to reposition pt opened her eyes, gazing to R primarily.          Social/Functional History  Unable to obtain      Vision/Hearing  Hearing  Hearing:  (unable to assess; appears adeqaute for assessment needs)      Cognition   Orientation  Overall Orientation Status:  (WERNER)  Cognition  Overall Cognitive Status: Exceptions  Arousal/Alertness: Unresponsive to stimuli;Inconsistent responses to stimuli  Following Commands: Does not follow commands; Inconsistently follows commands     Objective   Gross Assessment  Tone:  (mild tone in R UE; none noted in LEs)   Bed Mobility Training  Bed Mobility Training: Yes  Overall Level of Assistance: Assist X2;Total assistance  Supine to Sit: Assist X2;Total assistance  Sit to Supine: Total assistance;Assist X2  Scooting:  Total assistance;Assist X2  Balance  Sitting: Impaired (total A)  Transfer Training  Transfer Training:  (use of maxi move for all transfers)   AM-PAC Score  AM-PAC Inpatient Mobility Raw Score : 6 (10/07/22 0900)  AM-PAC Inpatient T-Scale Score : 23.55 (10/07/22 0900)  Mobility Inpatient CMS 0-100% Score: 100 (10/07/22 0900)  Mobility Inpatient CMS G-Code Modifier : CN (10/07/22 0900)     Goals  Short Term Goals  Short Term Goal 1: bed mobility with maxA x1  Short Term Goal 2: Sitting balance for 1 min with Otoniel       Education  Patient Education  Education Given To: Patient  Education Provided: Plan of Care;Role of Therapy  Education Provided Comments: provided call light to L hand and told pt to use if needed anything (pt grasped call light)  Education Method: Demonstration;Verbal  Barriers to Learning: Cognition  Education Outcome: Continued education needed      Therapy Time   Individual Concurrent Group Co-treatment   Time In 0805         Time Out 0845         Minutes 40               Electronically signed by Ros Frausto  on 10/7/2022 at 9:03 AM

## 2022-10-07 NOTE — PROGRESS NOTES
Received in cath lab for MARLO. No consent on chart, patient unable to give consent. Message left with Aunt to return call for consent. Procedure cancelled.

## 2022-10-07 NOTE — PROGRESS NOTES
PRN    Exam  Blood pressure (!) 164/111, pulse (!) 111, temperature 98.5 °F (36.9 °C), temperature source Axillary, resp. rate 18, height 5' (1.524 m), weight 137 lb 12.6 oz (62.5 kg), SpO2 96 %. Constitutional    Vital signs: BP, HR, and RR reviewed   General no distress. Eyes: unable to visualize the fundi  Cardiovascular: no peripheral edema. Psychiatric: no psychotic behavior noted. Neurologic  Mental status:   orientation non verbal.     Attention poor   Language non verbal.     Comprehension she is not following any commands at this time. Cranial nerves:   CN2: corneal reflexes are present. CN 3,4,6: no persistent R gaze deviation today. Tends to favor R gaze though in general.  Pupils are equal round reactive bilaterally. CN7: face symmetric   CN8: hearing fortino. CN12: fortino. Strength: less movement is noted in her RUE/RLE though not following commands. Sensory: she grimaces to trapezius pressure bilaterally. She reacts to pain stimulus in all 4 limbs. Cerebellar/coordination: finger nose finger fortino. Tone: decreased RUE>RLE. Gait: deferred at this time given clinical condition. ROS - chart reviewed. Afebrile. Labs  Na 139  Mg 1.70  CO2 - 19  Anion Gap 15    Folate 5.10  B12 - 1724    WBC 5.8K  Hg 10.7  Platelets 448    LDL 97  HgA1c 5.4    UA small LE, 15 WBC, 1+ bacteria    Studies  CT head w/o 10/6/22  Impression   1. Subacute infarct in the left frontal lobe in the distribution of the MARCELO   characterized on recent CT and MRI. 2. On the current exam, there is no pattern of significant edema or mass   effect relating to this infarct. No new area of edema is appreciated. 3. No evidence of hemorrhagic transformation. CTA head/neck 10/6/22  Impression   1. Occlusion of the proximal left A2 segment. 2. Mid basilar trunk aneurysm, ventrally directed with the base measuring 4   mm in width, 2.5 mm base to apex measurement.    3. Tortuous appearance of the bilateral distal cervical ICAs. 4. Mild-to-moderate luminal irregularity involving the PCAs. 5. Correlate with concerns for underlying arteriopathy/vasculopathy. MRI brain w/o 10/3/22  Impression   Left MARCELO distribution infarct without hemorrhagic transformation. Mild   corresponding T2 FLAIR hyperintensity is identified. Mild to moderate chronic microvascular disease and involutional changes. Scattered areas of chronic lacunar type infarcts are identified both basal   ganglia regions and thalamic regions and left greater than right side of the   thanh. MRA head/neck 10/3/22  No hemodynamically significant stenosis. EEG 10/3/22  Occasional sharp waves in the left temporal region, which appear most likely  consistent with focal epileptiform discharges as well as mild background  slowing and disorganization. No recorded seizures. Repeat EEG 10/5/22  Bilateral, often independent sharp waves in the left greater than right temporal regions. Bitemporal and bifrontal slowing, right somewhat greater than left. Generalized background slowing and disorganization. No recorded seizures. Impression/Recommendations  Embolic appearing stroke (L MARCELO, L cerebellar). She did just have a recent stroke in June (L subcortical, L thanh). Baseline developmental delay. Her neurologic exam remains poor. She does have a hx of seizures. EEGs have been w/out any recorded seizures. She is on  mg BID. MARLO is being arranged today per RN. Continue on telemetry while inpatient. Continue DAPT, statin.      Galindo Porras NP  [address] [place] [address] [address] Neurology    A copy of this note was provided for Dr Yg Anderson MD

## 2022-10-07 NOTE — PLAN OF CARE
Problem: Discharge Planning  Goal: Discharge to home or other facility with appropriate resources  Outcome: Not Progressing     Problem: Neurosensory - Adult  Goal: Achieves stable or improved neurological status  10/7/2022 0859 by Molly Kang RN  Outcome: Not Progressing  Note: NAME:  Alice Copeland  YOB: 1965  MEDICAL RECORD NUMBER:  [id number]  TODAYS DATE:  10/7/2022    Discussed personal risk factors for Stroke/TIA with patient/family, and ways to reduce the risk for a recurrent stroke. Patient's personal risk factors which were identified are:     [] Alcohol Abuse: check with your physician before any alcohol consumption. [] Atrial fibrillation: may cause blood clots. [] Drug Abuse: Seek help, talk with your doctor  [] Clotting Disorder  [] Diabetes  [] Family history of stroke or heart disease  [] High Blood Pressure/Hypertension: work with your physician.  [] High cholesterol: monitor cholesterol levels with your physician.   [] Overweight/Obesity: work with your physician for your ideal body weight.  [] Physical Inactivity: get regular exercise as directed by your physician. [] Personal history of previous TIA or stroke  [] Poor Diet; decrease salt (sodium) in your diet, follow diet directed by physician. [] Smoking: Cigarette/Cigar: stop smoking. Advised pt. that you can reduce your risk for stroke/TIA by modifying/controlling the risk factors that you have. Pt.advised to take the medications as prescribed, which will be detailed in the discharge instructions, and to not stop taking them without consulting their physician. In addition, pt. advised to maintain a healthy diet, exercise regularly and to not smoke. [facility]'s Stroke treatment and prevention, Managing your recovery  notebook  provided and/or reviewed  with patient/family.   The notebook includes, but not limited to, sections addressing warning signs & symptoms of a stroke, which are: sudden numbness or weakness especially on one side of the body, sudden confusion, difficulty speaking or understanding, sudden changes in vision, sudden dizziness or loss of balance/ coordination, sudden severe headache, syncope and seizure. The need to call EMS (911) immediately if signs & symptoms occur is emphasized . The notebook also provides education on Stroke community resources and stroke advocacy. The need for follow-up after discharge was highlighted with patient/family with them being able to repeat understanding of the importance of this. Pt nonverbal and minimal interactive. RN unable to obtain accurate information regarding pt's past medical history. Pt shows no evidence of learning.        Electronically signed by Martell Renee RN on 10/7/2022 at 8:59 AM   Goal: Achieves maximal functionality and self care  Outcome: Not Progressing     Problem: Musculoskeletal - Adult  Goal: Return mobility to safest level of function  Outcome: Not Progressing  Goal: Maintain proper alignment of affected body part  Outcome: Not Progressing  Goal: Return ADL status to a safe level of function  Outcome: Not Progressing     Problem: Gastrointestinal - Adult  Goal: Maintains or returns to baseline bowel function  Outcome: Not Progressing  Goal: Maintains adequate nutritional intake  Outcome: Not Progressing     Problem: Nutrition Deficit:  Goal: Optimize nutritional status  Outcome: Not Progressing

## 2022-10-07 NOTE — PROGRESS NOTES
Nephrology ([facility]) Progress Note    CC: hypernatremia and hypokalemia    Subjective:    HPI:  Breathing comfortably. MRDD. Labs noted . ROS:  opens her eyes , does not follow commands . [address]:  medications reviewed. Objective:  Blood pressure (!) 141/95, pulse 100, temperature 98.6 °F (37 °C), temperature source Axillary, resp. rate 17, height 5' (1.524 m), weight 137 lb 12.6 oz (62.5 kg), SpO2 98 %. Intake/Output Summary (Last 24 hours) at 10/7/2022 1309  Last data filed at 10/7/2022 2245  Gross per 24 hour   Intake 498.5 ml   Output 750 ml   Net -251.5 ml       General:  resting   Chest:  CTAB  CVS:  RRR  Abdominal:  NTND, soft, +BS  Extremities:  no edema  Skin:  no rash    Labs:  Renal panel:  Lab Results   Component Value Date/Time     10/07/2022 05:55 AM    K 3.5 10/07/2022 05:55 AM    CO2 19 (L) 10/07/2022 05:55 AM    BUN 4 (L) 10/07/2022 05:55 AM    CREATININE <0.5 (L) 10/07/2022 05:55 AM    CALCIUM 8.8 10/07/2022 05:55 AM    PHOS 2.7 10/07/2022 05:55 AM    MG 1.70 (L) 10/07/2022 05:55 AM     CBC:  Lab Results   Component Value Date/Time    WBC 5.8 10/07/2022 05:55 AM    HGB 10.7 (L) 10/07/2022 05:55 AM    HCT 33.3 (L) 10/07/2022 05:55 AM     10/07/2022 05:55 AM       Assessment/Plan:  Reviewed old records and labs. 1) hypernatremia   -Na better        - dc IVF     2) HTN meds adjusted     3) AMS   - possibly due to hypernatremia, but it should have improved given improvement in Na, and she should not be symptomatic at this level   - neurology consulted   - MRI and MRA pending (patient can receive gadolinium)    4) FEN   - will supp K/Mg . Phos noted. 5) MRDD   Will sign off . Please call with  questions or concerns  .        Dulce Piper MD,FACP

## 2022-10-07 NOTE — PROGRESS NOTES
Pt had an NG placed during day shift and pulled it out before the X ray was read. NG advanced again and repeat done dayshift. Telecam placed at bedside but malfunctioning. RN and PCA at bedside trouble shooting when noticed that pt had pulled out her NG again. VICTORINO Armendariz NP about plan. RN made NP aware that pt was to start on TF and had liptor and melatonin ordered for HS. Puthoff NP remove tube and have day team discuss placing anther one with a bridle. RN pulled NG.

## 2022-10-07 NOTE — PROGRESS NOTES
[facility]   Speech Therapy  Daily Dysphagia Treatment Note    Yoselyn Neville  AGE: 62 y.o. GENDER: female  : 1965  [id number]  EPISODE DATE:  2022    Patient Active Problem List   Diagnosis    Altered mental status    Hypernatremia    Hypokalemia    GARRETT (acute kidney injury) ([facility] [address]    Severe malnutrition ([facility] [address]     No Known Allergies    Treatment Diagnosis: Dysphagia     CHART REVIEW:  2022 admitted with c/o AMS  MD ADMISSION H&P HPI:  Eulogio Pittman is 62 y.o. female with history of developmental delay, seizure disorder, stroke, sickle cell disease. Per report, at baseline patient is verbally interactive but since yesterday patient has not been talking. She is brought to the ED from her nursing home for altered mental status. On interview, she does not say a word although she is awake. She is unable to answer any questions to me. She can make eye contact and withdraws from. Work-up reveals severe hypernatremia sodium 159, hypokalemia potassium 2.8 and GARRETT which suggest severe dehydration     NPO ordered 10/1/2022 d/t AMS     10/4/2022 Neuro notes concern for new embolic CVA (L MARCELO, L cerebellar) with additional note for recent stroke in  (L subcortical, L thanh)     IMAGING:  CXR: 2022  Impression   No radiographic evidence of acute cardiopulmonary disease. CT CHEST: 2022  Impression   No evidence of pulmonary embolism or acute pulmonary abnormality. Mild left   lower lobe atelectatic changes. BRAIN MRI: 10/2/2022  Impression   Left MARCELO distribution infarct without hemorrhagic transformation. Mild   corresponding T2 FLAIR hyperintensity is identified. Mild to moderate chronic microvascular disease and involutional changes. Scattered areas of chronic lacunar type infarcts are identified both basal   ganglia regions and thalamic regions and left greater than right side of the   thanh.          DYSPHAGIA HISTORY  Mech soft/thin at [facility] prior to admission  7/7/2022  Health SLP notes: Assessment: Patient presents with increased risk of oropharyngeal dysphagia secondary to cognitive status and comorbidities. No overt s/s aspiration observed with thin liquids or hard solids. Pt with prolonged and impaired mastication of hard solids, requiring multiple liquid washes. Based on today's assessment, recommend dysphagia 2 diet with thin liquids, meds whole or crushed with puree when awake/alert. Will follow. Subjective:     Current diet: NPO    Comments regarding tolerating Current Diet:   Persistent poor level of responsiveness      Objective:     Pain: did not state; does not appear to present with pain    Cognitive/Behavior   Behavior/Cognition: Alert, Cooperative, Doesn't follow directions, Requires cueing    Positioning: Fully upright in bed    PO Trials:   Thin Liquids  Nectar thick liquids x3: prolonged oral holding (at least 1 min per trial with increasing duration as trial progress); concern for disorganized oral prep and reduced bolus control; suspect piecemeal swallow; delayed swallow; decreased laryngeal elevation  Honey Thick liquids x3: prolonged oral holding (at least 1 min per trial); concern for disorganized oral prep and reduced bolus control; suspect piecemeal swallow; delayed swallow; decreased laryngeal elevation  Puree x1: prolonged oral holding; concern for disorganized oral prep and reduced bolus control; suspect piecemeal swallow; delayed swallow; decreased laryngeal elevation   Soft food  Regular food    Dysphagia Tx:   Direct Dysphagia tx: PO trials as noted above; slightly improved PO tolerance from prior visits, but increasing oral holding as trials progress with concern for poor endurance for PO; trials terminated after patient with oral holding and shutting eyes; poor endurance places patient at increased risk for penetration/aspiration/reduced clearance - rec consideration for MBS prior to initiating PO diet if patient with continued improvement for PO endurance  Dysphagia ex: unable to follow dx; limited responsiveness to stim  Training in compensatory strategies: NA  Pt response to ex/training: no evidence of patient learning    Goals: The patient will tolerate ongoing dysphagia evaluation (clinical vs instrumental) with additional goals to be determined as appropriate. Continue - rec NPO; potential MBS candidate if endurance for PO trials continues to improve    Assessment:   Impressions:   Patient alert/awake, noted with head turned right in bed. Patient does not follow dx and does not respond via verbalizations or gestures. Oral care administered with disorganized lingual response, no swallow response, and weak/reduced gag noted. Peristent oropharygneal dysphagia. PO trials limited d/t reduced endurance for PO trials but noted with improving endurance: 7 total PO trials tolerated prior to extinction of PO tolerance. Severe oral dysphagia characterized by poor volitional oral prep and poor oral motor strength for bolus formation and movement. Severe oral holding with all presented trials; disorganized oral prep and effortful prep with grimace noted at times. Repeated max cues for swallow. Delayed swallow and decreased laryngeal elevation with persistent concern for increased silent aspiration risk. Severity of oral phase impairments is a barrier to meaningful assessment. Recommend to continue NPO at this time. Concern for charted persistent limited level of responsiveness. Poor prognosis for PO intake and/or safety suspected d/t severity of current imps. It may be beneficial to consider long-term alternate nutrition needs vs review of goals of care d/t persistent poor PO appropriateness and poor prognosis for PO appropriateness.     Diet Recommendations:  NPO  Recommended Form of Meds:  (via alterante means (IV/rectal))    Strategies:    Oral care    Education:  Consulted and agree with results and recommendations: Patient, RN  Patient Education: completed on resutls/recs/plan  Patient Education Response: No evidence of learning    Prognosis:   Poor for PO safety d/t persistent severity of oral phase impairments    Plan:     Continue Dysphagia Therapy: YES    Interventions: Therapeutic Interventions: Therapeutic PO trials with SLP, Patient/Family education, Oral care  Duration/Frequency of therapy while on unit: Duration/Frequency of Treatment  Duration of Treatment: ST to tx 3-5 times per week during acute admission    Discharge Instructions:   Anticipate NEED for further skilled Speech Therapy for Dysphagia at discharge pending goals of care    This note serves as a D/C Summary in the event that this patient is discharged prior to the next therapy session.     Coded treatment time: 0  Total treatment time: 15    Electronically signed by OBINNA Lemon on 10/7/2022 at 3:20 PM

## 2022-10-08 LAB
GLUCOSE BLD-MCNC: 114 MG/DL (ref 70–99)
GLUCOSE BLD-MCNC: 122 MG/DL (ref 70–99)
GLUCOSE BLD-MCNC: 122 MG/DL (ref 70–99)
GLUCOSE BLD-MCNC: 123 MG/DL (ref 70–99)
GLUCOSE BLD-MCNC: 84 MG/DL (ref 70–99)
GLUCOSE BLD-MCNC: 87 MG/DL (ref 70–99)
PERFORMED ON: ABNORMAL
PERFORMED ON: NORMAL
PERFORMED ON: NORMAL
PHOSPHORUS: 3.2 MG/DL (ref 2.5–4.9)
VITAMIN B1, PLASMA: 8 NMOL/L (ref 4–15)

## 2022-10-08 PROCEDURE — 6370000000 HC RX 637 (ALT 250 FOR IP): Performed by: INTERNAL MEDICINE

## 2022-10-08 PROCEDURE — 84100 ASSAY OF PHOSPHORUS: CPT

## 2022-10-08 PROCEDURE — 6360000002 HC RX W HCPCS: Performed by: NURSE PRACTITIONER

## 2022-10-08 PROCEDURE — 36415 COLL VENOUS BLD VENIPUNCTURE: CPT

## 2022-10-08 PROCEDURE — 94760 N-INVAS EAR/PLS OXIMETRY 1: CPT

## 2022-10-08 PROCEDURE — 1200000000 HC SEMI PRIVATE

## 2022-10-08 PROCEDURE — 2580000003 HC RX 258: Performed by: INTERNAL MEDICINE

## 2022-10-08 PROCEDURE — 6360000002 HC RX W HCPCS: Performed by: INTERNAL MEDICINE

## 2022-10-08 PROCEDURE — 2060000000 HC ICU INTERMEDIATE R&B

## 2022-10-08 PROCEDURE — 2580000003 HC RX 258: Performed by: NURSE PRACTITIONER

## 2022-10-08 RX ADMIN — THERA TABS 1 TABLET: TAB at 10:29

## 2022-10-08 RX ADMIN — Medication 6 MG: at 20:48

## 2022-10-08 RX ADMIN — ATORVASTATIN CALCIUM 80 MG: 80 TABLET, FILM COATED ORAL at 20:48

## 2022-10-08 RX ADMIN — CARVEDILOL 6.25 MG: 6.25 TABLET, FILM COATED ORAL at 10:29

## 2022-10-08 RX ADMIN — ENOXAPARIN SODIUM 30 MG: 100 INJECTION SUBCUTANEOUS at 10:29

## 2022-10-08 RX ADMIN — LEVETIRACETAM 750 MG: 100 INJECTION, SOLUTION INTRAVENOUS at 14:26

## 2022-10-08 RX ADMIN — LEVETIRACETAM 750 MG: 100 INJECTION, SOLUTION INTRAVENOUS at 01:59

## 2022-10-08 RX ADMIN — VERAPAMIL HYDROCHLORIDE 120 MG: 120 TABLET ORAL at 20:48

## 2022-10-08 RX ADMIN — CARVEDILOL 6.25 MG: 6.25 TABLET, FILM COATED ORAL at 17:50

## 2022-10-08 RX ADMIN — Medication 1000 UNITS: at 10:30

## 2022-10-08 RX ADMIN — SODIUM CHLORIDE, PRESERVATIVE FREE 10 ML: 5 INJECTION INTRAVENOUS at 20:48

## 2022-10-08 RX ADMIN — Medication 100 MG: at 10:29

## 2022-10-08 RX ADMIN — CYANOCOBALAMIN TAB 1000 MCG 1000 MCG: 1000 TAB at 10:29

## 2022-10-08 RX ADMIN — SODIUM CHLORIDE, PRESERVATIVE FREE 10 ML: 5 INJECTION INTRAVENOUS at 10:30

## 2022-10-08 RX ADMIN — ASPIRIN 81 MG 81 MG: 81 TABLET ORAL at 10:29

## 2022-10-08 RX ADMIN — MIRTAZAPINE 7.5 MG: 15 TABLET, FILM COATED ORAL at 10:29

## 2022-10-08 ASSESSMENT — PAIN SCALES - WONG BAKER
WONGBAKER_NUMERICALRESPONSE: 0

## 2022-10-08 NOTE — PLAN OF CARE
Problem: Pain  Goal: Verbalizes/displays adequate comfort level or baseline comfort level  Outcome: Progressing     Problem: Skin/Tissue Integrity  Goal: Absence of new skin breakdown  Description: 1. Monitor for areas of redness and/or skin breakdown  2. Assess vascular access sites hourly  3. Every 4-6 hours minimum:  Change oxygen saturation probe site  4. Every 4-6 hours:  If on nasal continuous positive airway pressure, respiratory therapy assess nares and determine need for appliance change or resting period.   Outcome: Progressing     Problem: Safety - Adult  Goal: Free from fall injury  Outcome: Progressing     Problem: ABCDS Injury Assessment  Goal: Absence of physical injury  Outcome: Progressing     Problem: Neurosensory - Adult  Goal: Achieves stable or improved neurological status  Outcome: Progressing     Problem: Musculoskeletal - Adult  Goal: Return mobility to safest level of function  Outcome: Progressing

## 2022-10-08 NOTE — PROGRESS NOTES
Hospitalist Progress Note      PCP: Erica Nix MD    Date of Admission: 9/29/2022    Subjective: NG placed yesterday and TF started, holding plavix for now, cont to be minimally responsive. Unable to do MARLO yesterday since aunt unable to be reached for consent    Medications:  Reviewed    Infusion Medications    sodium chloride 5 mL/hr at 10/05/22 2486     Scheduled Medications    carvedilol  6.25 mg Oral BID WC    levETIRAcetam  750 mg IntraVENous Q12H    sodium chloride flush  5-40 mL IntraVENous 2 times per day    enoxaparin  30 mg SubCUTAneous Daily    mirtazapine  7.5 mg Oral Daily    verapamil  120 mg Oral Nightly    vitamin B-1  100 mg Oral Daily    vitamin B-12  1,000 mcg Oral Daily    Vitamin D  1,000 Units Oral Daily    [Held by provider] clopidogrel  75 mg Oral Daily    atorvastatin  80 mg Oral Nightly    aspirin  81 mg Oral Daily    melatonin  6 mg Oral Nightly    multivitamin  1 tablet Oral Daily     PRN Meds: polyethylene glycol, sodium chloride flush, sodium chloride, ondansetron **OR** ondansetron, acetaminophen **OR** acetaminophen, potassium chloride **OR** potassium alternative oral replacement **OR** potassium chloride      Intake/Output Summary (Last 24 hours) at 10/8/2022 1353  Last data filed at 10/8/2022 1030  Gross per 24 hour   Intake 2093 ml   Output 750 ml   Net 1343 ml       Physical Exam Performed:    /71   Pulse 91   Temp 98.2 °F (36.8 °C) (Axillary)   Resp 16   Ht 5' (1.524 m)   Wt 139 lb 8.8 oz (63.3 kg)   SpO2 98%   BMI 27.25 kg/m²     General appearance: Lethargic, chronically ill appearing  HEENT: Pupils equal, round, and reactive to light. Conjunctivae/corneas clear. Neck: Supple, with full range of motion. No jugular venous distention. Trachea midline. Respiratory:  diminished b/l  Cardiovascular: Regular rate and rhythm with normal S1/S2   Abdomen: Soft, non-tender, non-distended with normal bowel sounds.   Musculoskeletal: No clubbing, cyanosis or edema bilaterally. Full range of motion without deformity. Skin: Skin color, texture, turgor normal.  No rashes or lesions. Neurologic:  unable to exam, pt not responsive  Psychiatric: Lethargic. Capillary Refill: Brisk,< 3 seconds   Peripheral Pulses: +2 palpable, equal bilaterally    Labs:   Recent Labs     10/06/22  0441 10/07/22  0555   WBC 5.0 5.8   HGB 10.7* 10.7*   HCT 33.7* 33.3*    285     Recent Labs     10/06/22  0441 10/07/22  0555    139   K 3.5 3.5    105   CO2 22 19*   BUN 3* 4*   CREATININE <0.5* <0.5*   CALCIUM 8.7 8.8   PHOS 2.8 2.7     No results for input(s): AST, ALT, BILIDIR, BILITOT, ALKPHOS in the last 72 hours. No results for input(s): INR in the last 72 hours. No results for input(s): Shelly Fuse in the last 72 hours. Urinalysis:      Lab Results   Component Value Date/Time    NITRU Negative 10/01/2022 04:05 AM    WBCUA 15 10/01/2022 04:05 AM    BACTERIA 1+ 10/01/2022 04:05 AM    RBCUA 1 10/01/2022 04:05 AM    BLOODU Negative 10/01/2022 04:05 AM    SPECGRAV 1.037 10/01/2022 04:05 AM    GLUCOSEU Negative 10/01/2022 04:05 AM       Radiology:  XR CHEST PORTABLE   Final Result   Status post feeding tube placement which is looped in the distal stomach with   the tip in the fundus. Recommend readjusting the tip into the distal stomach   for more physiologic positioning. Stable mild cardiomegaly with resolving central pulmonary congestion and   slowly resolving left basilar opacity. XR CHEST PORTABLE   Final Result   Nasogastric tube in good position with a non-specific gas pattern. XR CHEST PORTABLE   Final Result   Enteric tube in appropriate position. CTA HEAD NECK W CONTRAST   Final Result   1. Occlusion of the proximal left A2 segment. 2. Mid basilar trunk aneurysm, ventrally directed with the base measuring 4   mm in width, 2.5 mm base to apex measurement. 3. Tortuous appearance of the bilateral distal cervical ICAs.    4. Mild-to-moderate luminal irregularity involving the PCAs. 5. Correlate with concerns for underlying arteriopathy/vasculopathy. CT HEAD WO CONTRAST   Final Result   1. Subacute infarct in the left frontal lobe in the distribution of the MARCELO   characterized on recent CT and MRI. 2. On the current exam, there is no pattern of significant edema or mass   effect relating to this infarct. No new area of edema is appreciated. 3. No evidence of hemorrhagic transformation. Findings were discussed with Ron Lerma RN, at 12:41 pm on 10/6/2022. MRI BRAIN WO CONTRAST   Final Result   Left MARCELO distribution infarct without hemorrhagic transformation. Mild   corresponding T2 FLAIR hyperintensity is identified. Mild to moderate chronic microvascular disease and involutional changes. Scattered areas of chronic lacunar type infarcts are identified both basal   ganglia regions and thalamic regions and left greater than right side of the   thanh. The findings were sent to the Radiology Results [id number] at 5:43   pm on 10/3/2022 to be communicated to a licensed caregiver. MRA HEAD WO CONTRAST   Final Result   No definite large vessel occlusion to account for the left MARCELO infarct on the   MRI. MRA NECK WO CONTRAST   Final Result   Negative for hemodynamic stenosis. XR ABDOMEN (KUB) (SINGLE AP VIEW)   Final Result   No radiopaque foreign body is evident. CT HEAD WO CONTRAST   Final Result   1. No acute intracranial bleed. 2. Multifocal age indeterminate lacunar strokes involve right centrum   semiovale, bilateral basal ganglia and left caudate lobe. 3.  White matter hypoattenuation described is typical of microvascular   ischemic disease or as sequela of dysmyelinating/demyelinating processes. 4.  Vascular calcifications are noted reflecting calcific atherosclerosis.          CT CHEST PULMONARY EMBOLISM W CONTRAST   Final Result   No evidence of pulmonary embolism or acute pulmonary abnormality. Mild left   lower lobe atelectatic changes. XR CHEST PORTABLE   Final Result   No radiographic evidence of acute cardiopulmonary disease. Assessment/Plan:    Active Hospital Problems    Diagnosis     Severe malnutrition ([facility] [address] [E43]      Priority: Medium    Altered mental status [R41.82]      Priority: Medium    Hypernatremia [E87.0]      Priority: Medium    Hypokalemia [E87.6]      Priority: Medium    GARRETT (acute kidney injury) ([facility] [address] [N17.9]      Priority: Medium          Acute metabolic encephalopathy - unclear etiology, didn't improve significantly with electrolyte correction. Repeat U/A showed pyuria.  -continue D5W  -nephrology consulted, recs appreciated  -BMP q6h  -neurology consulted, recs appreciated  -check procalcitonin  -check molecular PCR panel including COVID  -MRI Brain without contrast with left MARCELO stroke  -MRA Head and Neck with contrast reviewed  -EEG reviewed, abnormal - cont keppra  -TSH WNL  - discussed with aunt, wants to have PEG tube placed, GI consulted  - hold plavix in anticipation of need for PEG  - NG tube placed, ok to place restraints if pt pulling out NG, started TF     Acute CVA - MRI reviewed, neurology following. On ASA/plavix/statin.    Ordered MARLO and unable to be done 10/7 (friday) since aunt unable to reached on phone for consent  consulted hem/onc for suspected hypercoag work-up per neuro recs - no further work-up per hem/onc     Pyuria - UTI ruled out after further study  -stop ceftriaxone  -urine culture showed mixed jovon only     Hypernatremia - suspect due to severe dehydration, resolved  -off IVF  -nephrology consulted, recs appreciated  -BMP q6h     Hypokalemia  -replace PRN  -trend and monitor  -nephrology following     GARRETT - resolved  - likely prerenal due to severe dehydration, improved  -continue IVF  -nephrology managing fluids  -avoid nephrotoxic medications  -hold home ACEi  -trend BMP  -checked urine studies     Hx CVA  -continue home meds including DAPT and high intensity statin     Hx seizure disorder  -change to IV Keppra and continue  - EEG reviewed     Developmental delay  Sickle cell disease           DVT Prophylaxis: Lovenox  Diet: Diet NPO  ADULT TUBE FEEDING; Nasogastric; Standard with Fiber; Continuous; 25; Yes; 10; Q 6 hours; 45; 100; Q 4 hours  Code Status: Full Code  PT/OT Eval Status: ordered    Dispo - cont care. Unable to reach aunt yesterday, will try to talk to aunt today and revisit goals of care. Given recent stroke, very cautious to hold plavix for potential placement of PEG tube per aunt wishes, but prognosis is guarded regardless.  Will see if aunt can come by and visit pt in hospital to discuss goals of care on Monday along with specialist and palliative care        Sera Hatch MD

## 2022-10-08 NOTE — PROGRESS NOTES
[facility]   Speech Therapy  Daily Dysphagia Treatment Note    Yoselyn Neville  AGE: 62 y.o. GENDER: female  : 1965  [id number]  EPISODE DATE:  2022    Patient Active Problem List   Diagnosis    Altered mental status    Hypernatremia    Hypokalemia    GARRETT (acute kidney injury) ([facility] [address]    Severe malnutrition ([facility] [address]     No Known Allergies    Treatment Diagnosis: Dysphagia     CHART REVIEW:  2022 admitted with c/o AMS  MD ADMISSION H&P HPI:  Lilia Friedman is 62 y.o. female with history of developmental delay, seizure disorder, stroke, sickle cell disease. Per report, at baseline patient is verbally interactive but since yesterday patient has not been talking. She is brought to the ED from her nursing home for altered mental status. On interview, she does not say a word although she is awake. She is unable to answer any questions to me. She can make eye contact and withdraws from. Work-up reveals severe hypernatremia sodium 159, hypokalemia potassium 2.8 and GARRETT which suggest severe dehydration     NPO ordered 10/1/2022 d/t AMS     10/4/2022 Neuro notes concern for new embolic CVA (L MARCELO, L cerebellar) with additional note for recent stroke in  (L subcortical, L thanh)     IMAGING:  CXR: 2022  Impression   No radiographic evidence of acute cardiopulmonary disease. CT CHEST: 2022  Impression   No evidence of pulmonary embolism or acute pulmonary abnormality. Mild left   lower lobe atelectatic changes. BRAIN MRI: 10/2/2022  Impression   Left MARCELO distribution infarct without hemorrhagic transformation. Mild   corresponding T2 FLAIR hyperintensity is identified. Mild to moderate chronic microvascular disease and involutional changes. Scattered areas of chronic lacunar type infarcts are identified both basal   ganglia regions and thalamic regions and left greater than right side of the   thanh.          DYSPHAGIA HISTORY  Mech soft/thin at [facility] prior to admission  7/7/2022  Health SLP notes: Assessment: Patient presents with increased risk of oropharyngeal dysphagia secondary to cognitive status and comorbidities. No overt s/s aspiration observed with thin liquids or hard solids. Pt with prolonged and impaired mastication of hard solids, requiring multiple liquid washes. Based on today's assessment, recommend dysphagia 2 diet with thin liquids, meds whole or crushed with puree when awake/alert. Will follow. Subjective:     Current diet: NG tube feeding    Comments regarding tolerating Current Diet:   Persistent poor level of responsiveness; no improvement per nsg. Objective:     Pain: unable to awaken    Cognitive/Behavior   Behavior/Cognition: unable to awaken with verbal cues, elevating head, gentle sternal rub and wet wash cloth. Positioning: Fully upright in bed    PO Trials:  No trials this date due to pt unable to be awakened. Dysphagia Tx:   Direct Dysphagia tx: unable this date  Dysphagia ex: N/A  Training in compensatory strategies: NA  Pt response to ex/training: N/A    Goals: The patient will tolerate ongoing dysphagia evaluation (clinical vs instrumental) with additional goals to be determined as appropriate. Continue - rec NPO; potential MBS candidate if endurance for PO trials continues to improve    Assessment:   Impressions:   Pt unable to be awakened this date with verbal and tactile stimulation. Pt now with NG tube  Recommend continue NPO and ST to continue to follow.     Diet Recommendations:  NPO with NG  Recommended Form of Meds:  (via alterante means (IV/rectal))    Strategies:    Oral care    Education:  Consulted and agree with results and recommendations: Patient, RN  Patient Education: completed on resutls/recs/plan  Patient Education Response: No evidence of learning    Prognosis:   Poor for PO safety d/t persistent severity of oral phase impairments    Plan:     Continue Dysphagia Therapy: YES    Interventions: Therapeutic Interventions: Therapeutic PO trials with SLP, Patient/Family education, Oral care  Duration/Frequency of therapy while on unit: Duration/Frequency of Treatment  Duration of Treatment: ST to tx 3-5 times per week during acute admission    Discharge Instructions:   Anticipate NEED for further skilled Speech Therapy for Dysphagia at discharge pending goals of care    This note serves as a D/C Summary in the event that this patient is discharged prior to the next therapy session.     Coded treatment time: 0  Total treatment time: 5 (no charge)    Electronically signed by Brooke Almonte MS CCC/SLP [id number]  Speech Language Pathologist   on 10/8/2022 at 1:25 PM

## 2022-10-09 LAB
GLUCOSE BLD-MCNC: 104 MG/DL (ref 70–99)
GLUCOSE BLD-MCNC: 111 MG/DL (ref 70–99)
GLUCOSE BLD-MCNC: 112 MG/DL (ref 70–99)
GLUCOSE BLD-MCNC: 113 MG/DL (ref 70–99)
GLUCOSE BLD-MCNC: 99 MG/DL (ref 70–99)
GLUCOSE BLD-MCNC: 99 MG/DL (ref 70–99)
PERFORMED ON: ABNORMAL
PERFORMED ON: NORMAL
PERFORMED ON: NORMAL
PHOSPHORUS: 3.2 MG/DL (ref 2.5–4.9)

## 2022-10-09 PROCEDURE — 2580000003 HC RX 258: Performed by: NURSE PRACTITIONER

## 2022-10-09 PROCEDURE — 84100 ASSAY OF PHOSPHORUS: CPT

## 2022-10-09 PROCEDURE — 36415 COLL VENOUS BLD VENIPUNCTURE: CPT

## 2022-10-09 PROCEDURE — 6360000002 HC RX W HCPCS: Performed by: NURSE PRACTITIONER

## 2022-10-09 PROCEDURE — 6370000000 HC RX 637 (ALT 250 FOR IP): Performed by: INTERNAL MEDICINE

## 2022-10-09 PROCEDURE — 94760 N-INVAS EAR/PLS OXIMETRY 1: CPT

## 2022-10-09 PROCEDURE — 6360000002 HC RX W HCPCS: Performed by: INTERNAL MEDICINE

## 2022-10-09 PROCEDURE — 2060000000 HC ICU INTERMEDIATE R&B

## 2022-10-09 RX ADMIN — ASPIRIN 81 MG 81 MG: 81 TABLET ORAL at 08:25

## 2022-10-09 RX ADMIN — Medication 1000 UNITS: at 08:24

## 2022-10-09 RX ADMIN — ENOXAPARIN SODIUM 30 MG: 100 INJECTION SUBCUTANEOUS at 08:24

## 2022-10-09 RX ADMIN — VERAPAMIL HYDROCHLORIDE 120 MG: 120 TABLET ORAL at 20:49

## 2022-10-09 RX ADMIN — CYANOCOBALAMIN TAB 1000 MCG 1000 MCG: 1000 TAB at 08:24

## 2022-10-09 RX ADMIN — LEVETIRACETAM 750 MG: 100 INJECTION, SOLUTION INTRAVENOUS at 01:41

## 2022-10-09 RX ADMIN — ATORVASTATIN CALCIUM 80 MG: 80 TABLET, FILM COATED ORAL at 20:46

## 2022-10-09 RX ADMIN — LEVETIRACETAM 750 MG: 100 INJECTION, SOLUTION INTRAVENOUS at 13:49

## 2022-10-09 RX ADMIN — Medication 100 MG: at 08:25

## 2022-10-09 RX ADMIN — CARVEDILOL 6.25 MG: 6.25 TABLET, FILM COATED ORAL at 08:25

## 2022-10-09 RX ADMIN — Medication 6 MG: at 20:46

## 2022-10-09 RX ADMIN — CARVEDILOL 6.25 MG: 6.25 TABLET, FILM COATED ORAL at 17:04

## 2022-10-09 RX ADMIN — THERA TABS 1 TABLET: TAB at 08:24

## 2022-10-09 RX ADMIN — MIRTAZAPINE 7.5 MG: 15 TABLET, FILM COATED ORAL at 08:24

## 2022-10-09 ASSESSMENT — PAIN SCALES - WONG BAKER
WONGBAKER_NUMERICALRESPONSE: 0

## 2022-10-09 NOTE — PLAN OF CARE
Problem: Pain  Goal: Verbalizes/displays adequate comfort level or baseline comfort level  10/9/2022 0042 by Kaylene Luciano RN  Outcome: Progressing  10/8/2022 1623 by Primo Segura RN  Outcome: Progressing     Problem: Skin/Tissue Integrity  Goal: Absence of new skin breakdown  Description: 1. Monitor for areas of redness and/or skin breakdown  2. Assess vascular access sites hourly  3. Every 4-6 hours minimum:  Change oxygen saturation probe site  4. Every 4-6 hours:  If on nasal continuous positive airway pressure, respiratory therapy assess nares and determine need for appliance change or resting period.   10/9/2022 0042 by Kaylene Luciano RN  Outcome: Progressing  10/8/2022 1623 by Primo Segura RN  Outcome: Progressing     Problem: Safety - Adult  Goal: Free from fall injury  Outcome: Progressing     Problem: ABCDS Injury Assessment  Goal: Absence of physical injury  Outcome: Progressing     Problem: Neurosensory - Adult  Goal: Achieves stable or improved neurological status  Outcome: Progressing

## 2022-10-10 LAB
A/G RATIO: 0.8 (ref 1.1–2.2)
ALBUMIN SERPL-MCNC: 2.6 G/DL (ref 3.4–5)
ALP BLD-CCNC: 65 U/L (ref 40–129)
ALT SERPL-CCNC: 10 U/L (ref 10–40)
ANION GAP SERPL CALCULATED.3IONS-SCNC: 10 MMOL/L (ref 3–16)
AST SERPL-CCNC: 26 U/L (ref 15–37)
BASOPHILS ABSOLUTE: 0.1 K/UL (ref 0–0.2)
BASOPHILS RELATIVE PERCENT: 0.6 %
BILIRUB SERPL-MCNC: <0.2 MG/DL (ref 0–1)
BUN BLDV-MCNC: 7 MG/DL (ref 7–20)
CALCIUM SERPL-MCNC: 8.8 MG/DL (ref 8.3–10.6)
CHLORIDE BLD-SCNC: 108 MMOL/L (ref 99–110)
CO2: 25 MMOL/L (ref 21–32)
CREAT SERPL-MCNC: <0.5 MG/DL (ref 0.6–1.1)
EOSINOPHILS ABSOLUTE: 0.2 K/UL (ref 0–0.6)
EOSINOPHILS RELATIVE PERCENT: 2.2 %
GFR AFRICAN AMERICAN: >60
GFR NON-AFRICAN AMERICAN: >60
GLUCOSE BLD-MCNC: 100 MG/DL (ref 70–99)
GLUCOSE BLD-MCNC: 103 MG/DL (ref 70–99)
GLUCOSE BLD-MCNC: 107 MG/DL (ref 70–99)
GLUCOSE BLD-MCNC: 108 MG/DL (ref 70–99)
GLUCOSE BLD-MCNC: 112 MG/DL (ref 70–99)
GLUCOSE BLD-MCNC: 98 MG/DL (ref 70–99)
HCT VFR BLD CALC: 35.3 % (ref 36–48)
HEMOGLOBIN: 11.2 G/DL (ref 12–16)
LYMPHOCYTES ABSOLUTE: 1 K/UL (ref 1–5.1)
LYMPHOCYTES RELATIVE PERCENT: 10.7 %
MAGNESIUM: 1.8 MG/DL (ref 1.8–2.4)
MCH RBC QN AUTO: 23.5 PG (ref 26–34)
MCHC RBC AUTO-ENTMCNC: 31.7 G/DL (ref 31–36)
MCV RBC AUTO: 74.3 FL (ref 80–100)
MONOCYTES ABSOLUTE: 0.4 K/UL (ref 0–1.3)
MONOCYTES RELATIVE PERCENT: 4.9 %
NEUTROPHILS ABSOLUTE: 7.3 K/UL (ref 1.7–7.7)
NEUTROPHILS RELATIVE PERCENT: 81.6 %
PDW BLD-RTO: 20.2 % (ref 12.4–15.4)
PERFORMED ON: ABNORMAL
PHOSPHORUS: 2.8 MG/DL (ref 2.5–4.9)
PLATELET # BLD: 359 K/UL (ref 135–450)
PMV BLD AUTO: 8 FL (ref 5–10.5)
POTASSIUM SERPL-SCNC: 4.7 MMOL/L (ref 3.5–5.1)
RBC # BLD: 4.76 M/UL (ref 4–5.2)
SODIUM BLD-SCNC: 143 MMOL/L (ref 136–145)
TOTAL PROTEIN: 5.7 G/DL (ref 6.4–8.2)
WBC # BLD: 8.9 K/UL (ref 4–11)

## 2022-10-10 PROCEDURE — 80053 COMPREHEN METABOLIC PANEL: CPT

## 2022-10-10 PROCEDURE — 6360000002 HC RX W HCPCS: Performed by: NURSE PRACTITIONER

## 2022-10-10 PROCEDURE — 6370000000 HC RX 637 (ALT 250 FOR IP): Performed by: INTERNAL MEDICINE

## 2022-10-10 PROCEDURE — 2580000003 HC RX 258: Performed by: INTERNAL MEDICINE

## 2022-10-10 PROCEDURE — 36415 COLL VENOUS BLD VENIPUNCTURE: CPT

## 2022-10-10 PROCEDURE — 2580000003 HC RX 258: Performed by: NURSE PRACTITIONER

## 2022-10-10 PROCEDURE — 83735 ASSAY OF MAGNESIUM: CPT

## 2022-10-10 PROCEDURE — 94760 N-INVAS EAR/PLS OXIMETRY 1: CPT

## 2022-10-10 PROCEDURE — 6360000002 HC RX W HCPCS: Performed by: INTERNAL MEDICINE

## 2022-10-10 PROCEDURE — 85025 COMPLETE CBC W/AUTO DIFF WBC: CPT

## 2022-10-10 PROCEDURE — 9990000010 HC NO CHARGE VISIT

## 2022-10-10 PROCEDURE — 2060000000 HC ICU INTERMEDIATE R&B

## 2022-10-10 PROCEDURE — 84100 ASSAY OF PHOSPHORUS: CPT

## 2022-10-10 RX ADMIN — LEVETIRACETAM 750 MG: 100 INJECTION, SOLUTION INTRAVENOUS at 01:46

## 2022-10-10 RX ADMIN — CARVEDILOL 6.25 MG: 6.25 TABLET, FILM COATED ORAL at 17:01

## 2022-10-10 RX ADMIN — ATORVASTATIN CALCIUM 80 MG: 80 TABLET, FILM COATED ORAL at 22:43

## 2022-10-10 RX ADMIN — Medication 100 MG: at 09:44

## 2022-10-10 RX ADMIN — ENOXAPARIN SODIUM 30 MG: 100 INJECTION SUBCUTANEOUS at 09:43

## 2022-10-10 RX ADMIN — SODIUM CHLORIDE, PRESERVATIVE FREE 10 ML: 5 INJECTION INTRAVENOUS at 23:19

## 2022-10-10 RX ADMIN — VERAPAMIL HYDROCHLORIDE 120 MG: 120 TABLET ORAL at 22:43

## 2022-10-10 RX ADMIN — MIRTAZAPINE 7.5 MG: 15 TABLET, FILM COATED ORAL at 09:43

## 2022-10-10 RX ADMIN — LEVETIRACETAM 750 MG: 100 INJECTION, SOLUTION INTRAVENOUS at 15:08

## 2022-10-10 RX ADMIN — THERA TABS 1 TABLET: TAB at 09:44

## 2022-10-10 RX ADMIN — CYANOCOBALAMIN TAB 1000 MCG 1000 MCG: 1000 TAB at 09:44

## 2022-10-10 RX ADMIN — ASPIRIN 81 MG 81 MG: 81 TABLET ORAL at 09:44

## 2022-10-10 RX ADMIN — Medication 1000 UNITS: at 09:44

## 2022-10-10 RX ADMIN — CARVEDILOL 6.25 MG: 6.25 TABLET, FILM COATED ORAL at 09:44

## 2022-10-10 ASSESSMENT — PAIN SCALES - WONG BAKER

## 2022-10-10 NOTE — PROGRESS NOTES
Progress Note    Updates  No significant events overnight. No improvement clinically. Current Facility-Administered Medications:   carvedilol (COREG) tablet 6.25 mg, 6.25 mg, Oral, BID WC  polyethylene glycol (GLYCOLAX) packet 17 g, 17 g, Oral, Daily PRN  levETIRAcetam (KEPPRA) 750 mg in sodium chloride 0.9 % 100 mL IVPB, 750 mg, IntraVENous, Q12H  sodium chloride flush 0.9 % injection 5-40 mL, 5-40 mL, IntraVENous, 2 times per day  sodium chloride flush 0.9 % injection 5-40 mL, 5-40 mL, IntraVENous, PRN  0.9 % sodium chloride infusion, , IntraVENous, PRN  ondansetron (ZOFRAN-ODT) disintegrating tablet 4 mg, 4 mg, Oral, Q8H PRN **OR** ondansetron (ZOFRAN) injection 4 mg, 4 mg, IntraVENous, Q6H PRN  acetaminophen (TYLENOL) tablet 650 mg, 650 mg, Oral, Q6H PRN **OR** acetaminophen (TYLENOL) suppository 650 mg, 650 mg, Rectal, Q6H PRN  enoxaparin Sodium (LOVENOX) injection 30 mg, 30 mg, SubCUTAneous, Daily  mirtazapine (REMERON) tablet 7.5 mg, 7.5 mg, Oral, Daily  verapamil (CALAN) tablet 120 mg, 120 mg, Oral, Nightly  thiamine mononitrate tablet 100 mg, 100 mg, Oral, Daily  vitamin B-12 (CYANOCOBALAMIN) tablet 1,000 mcg, 1,000 mcg, Oral, Daily  vitamin D (CHOLECALCIFEROL) tablet 1,000 Units, 1,000 Units, Oral, Daily  [Held by provider] clopidogrel (PLAVIX) tablet 75 mg, 75 mg, Oral, Daily  atorvastatin (LIPITOR) tablet 80 mg, 80 mg, Oral, Nightly  aspirin chewable tablet 81 mg, 81 mg, Oral, Daily  melatonin tablet 6 mg, 6 mg, Oral, Nightly  multivitamin 1 tablet, 1 tablet, Oral, Daily  potassium chloride (KLOR-CON M) extended release tablet 40 mEq, 40 mEq, Oral, PRN **OR** potassium bicarb-citric acid (EFFER-K) effervescent tablet 40 mEq, 40 mEq, Oral, PRN **OR** potassium chloride 10 mEq/100 mL IVPB (Peripheral Line), 10 mEq, IntraVENous, PRN    Exam  Blood pressure (!) 154/116, pulse 100, temperature 98.4 °F (36.9 °C), temperature source Axillary, resp.  rate 16, height 5' (1.524 m), weight 147 lb 0.8 oz (66.7 kg), SpO2 100 %. Constitutional    Vital signs: BP, HR, and RR reviewed   General no distress. Eyes: unable to visualize the fundi  Cardiovascular: no peripheral edema. Psychiatric: no psychotic behavior noted. Neurologic  Mental status:   orientation non verbal.     Attention poor   Language non verbal.     Comprehension she is not following any commands at this time. Cranial nerves:   CN2: corneal reflexes are present. CN 3,4,6: no gaze deviation today. Pupils are equal round reactive bilaterally. CN7: face symmetric   CN8: hearing fortino. CN12: fortino. Strength: less movement is noted in her RUE/RLE though not following commands. Sensory: she grimaces to trapezius pressure bilaterally and pain stimulus in all 4 limbs though L>R. Cerebellar/coordination: finger nose finger fortino. Tone: decreased RUE/RLE. Gait: deferred at this time given clinical condition. ROS - chart reviewed. Afebrile. Labs  Folate 5.10  B12 - 1724    LDL 97  HgA1c 5.4    UA small LE, 15 WBC, 1+ bacteria    Studies  CT head w/o 10/6/22  Impression   1. Subacute infarct in the left frontal lobe in the distribution of the MARCELO   characterized on recent CT and MRI. 2. On the current exam, there is no pattern of significant edema or mass   effect relating to this infarct. No new area of edema is appreciated. 3. No evidence of hemorrhagic transformation. CTA head/neck 10/6/22  Impression   1. Occlusion of the proximal left A2 segment. 2. Mid basilar trunk aneurysm, ventrally directed with the base measuring 4   mm in width, 2.5 mm base to apex measurement. 3. Tortuous appearance of the bilateral distal cervical ICAs. 4. Mild-to-moderate luminal irregularity involving the PCAs. 5. Correlate with concerns for underlying arteriopathy/vasculopathy. MRI brain w/o 10/3/22  Impression   Left MARCELO distribution infarct without hemorrhagic transformation.   Mild   corresponding T2 FLAIR hyperintensity is identified. Mild to moderate chronic microvascular disease and involutional changes. Scattered areas of chronic lacunar type infarcts are identified both basal   ganglia regions and thalamic regions and left greater than right side of the   thanh. MRA head/neck 10/3/22  No hemodynamically significant stenosis. EEG 10/3/22  Occasional sharp waves in the left temporal region, which appear most likely  consistent with focal epileptiform discharges as well as mild background  slowing and disorganization. No recorded seizures. Repeat EEG 10/5/22  Bilateral, often independent sharp waves in the left greater than right temporal regions. Bitemporal and bifrontal slowing, right somewhat greater than left. Generalized background slowing and disorganization. No recorded seizures. Impression/Recommendations  Embolic appearing stroke (L MARCELO, L cerebellar). She did just have a recent stroke in June (L subcortical, L thanh). She is encephalopathic, superimposed on baseline developmental delay. She does have a hx of seizures. EEGs have been w/out any recorded seizures. She is on  mg BID. She needs an ECHO. A MARLO has been ordered though apparently this hasn't been able to be completed due to issues w/ consent. Family is at the bedside now, RN notified. If for some reason this isn't done please do TTE. Continue on telemetry while inpatient    Continue DAPT, statin. Basilar aneurysm is noted. This was seen on CTA in July. Should be followed up on outpatient w/ neurosurgery.       Eddi Abarca [address] Neurology    A copy of this note was provided for Dr Sami De Los Santos MD

## 2022-10-10 NOTE — PROGRESS NOTES
Speech Language Pathology    12:42 PM  Dysphagia intervention attempted. Patient unable to be adequately awakened for tx this date. Chart reviewed and patient discussed with RN. Dysphagia status appears relatively unchanged with continued concern for high penetration/aspiration risk. ST to continue to follow as able with plan for re-attempt as schedule permits unless otherwise notified to see patient. 4:35 PM  Treatment re-attempt. Patient continues to sleep soundly. ST to re-attempt as schedule permits at a later date unless otherwise notified. Thank you. Madelaine HernandezCass Lake Hospitalte, [address] [facility] [id number]  Speech-Language Pathologist  Portable phone: [phone]

## 2022-10-10 NOTE — PROGRESS NOTES
Hospitalist Progress Note      PCP: Luciana Miranda MD    Date of Admission: 9/29/2022    Subjective: no acute events overnight    Medications:  Reviewed    Infusion Medications    sodium chloride 5 mL/hr at 10/05/22 2256     Scheduled Medications    carvedilol  6.25 mg Oral BID WC    levETIRAcetam  750 mg IntraVENous Q12H    sodium chloride flush  5-40 mL IntraVENous 2 times per day    enoxaparin  30 mg SubCUTAneous Daily    mirtazapine  7.5 mg Oral Daily    verapamil  120 mg Oral Nightly    vitamin B-1  100 mg Oral Daily    vitamin B-12  1,000 mcg Oral Daily    Vitamin D  1,000 Units Oral Daily    [Held by provider] clopidogrel  75 mg Oral Daily    atorvastatin  80 mg Oral Nightly    aspirin  81 mg Oral Daily    melatonin  6 mg Oral Nightly    multivitamin  1 tablet Oral Daily     PRN Meds: polyethylene glycol, sodium chloride flush, sodium chloride, ondansetron **OR** ondansetron, acetaminophen **OR** acetaminophen, potassium chloride **OR** potassium alternative oral replacement **OR** potassium chloride      Intake/Output Summary (Last 24 hours) at 10/10/2022 1535  Last data filed at 10/10/2022 1524  Gross per 24 hour   Intake 2044 ml   Output 2650 ml   Net -606 ml       Physical Exam Performed:    BP (!) 140/90   Pulse 100   Temp 97.6 °F (36.4 °C) (Axillary)   Resp 16   Ht 5' (1.524 m)   Wt 147 lb 0.8 oz (66.7 kg)   SpO2 100%   BMI 28.72 kg/m²        General appearance: Lethargic, chronically ill appearing  HEENT: Pupils equal, round, and reactive to light. Conjunctivae/corneas clear. Neck: Supple, with full range of motion. No jugular venous distention. Trachea midline. Respiratory:  diminished b/l  Cardiovascular: Regular rate and rhythm with normal S1/S2   Abdomen: Soft, non-tender, non-distended with normal bowel sounds. Musculoskeletal: No clubbing, cyanosis or edema bilaterally. Full range of motion without deformity.   Skin: Skin color, texture, turgor normal.  No rashes or lesions. Neurologic:  unable to exam, pt not responsive  Psychiatric: Lethargic. Capillary Refill: Brisk,< 3 seconds   Peripheral Pulses: +2 palpable, equal bilaterally       Labs:   Recent Labs     10/10/22  1320   WBC 8.9   HGB 11.2*   HCT 35.3*        Recent Labs     10/08/22  0556 10/09/22  0910 10/10/22  0604 10/10/22  1320   NA  --   --   --  143   K  --   --   --  4.7   CL  --   --   --  108   CO2  --   --   --  25   BUN  --   --   --  7   CREATININE  --   --   --  <0.5*   CALCIUM  --   --   --  8.8   PHOS 3.2 3.2 2.8  --      Recent Labs     10/10/22  1320   AST 26   ALT 10   BILITOT <0.2   ALKPHOS 65     No results for input(s): INR in the last 72 hours. No results for input(s): [place] in the last 72 hours. Urinalysis:      Lab Results   Component Value Date/Time    NITRU Negative 10/01/2022 04:05 AM    WBCUA 15 10/01/2022 04:05 AM    BACTERIA 1+ 10/01/2022 04:05 AM    RBCUA 1 10/01/2022 04:05 AM    BLOODU Negative 10/01/2022 04:05 AM    SPECGRAV 1.037 10/01/2022 04:05 AM    GLUCOSEU Negative 10/01/2022 04:05 AM       Radiology:  XR CHEST PORTABLE   Final Result   Status post feeding tube placement which is looped in the distal stomach with   the tip in the fundus. Recommend readjusting the tip into the distal stomach   for more physiologic positioning. Stable mild cardiomegaly with resolving central pulmonary congestion and   slowly resolving left basilar opacity. XR CHEST PORTABLE   Final Result   Nasogastric tube in good position with a non-specific gas pattern. XR CHEST PORTABLE   Final Result   Enteric tube in appropriate position. CTA HEAD NECK W CONTRAST   Final Result   1. Occlusion of the proximal left A2 segment. 2. Mid basilar trunk aneurysm, ventrally directed with the base measuring 4   mm in width, 2.5 mm base to apex measurement. 3. Tortuous appearance of the bilateral distal cervical ICAs.    4. Mild-to-moderate luminal irregularity involving the PCAs. 5. Correlate with concerns for underlying arteriopathy/vasculopathy. CT HEAD WO CONTRAST   Final Result   1. Subacute infarct in the left frontal lobe in the distribution of the MARCELO   characterized on recent CT and MRI. 2. On the current exam, there is no pattern of significant edema or mass   effect relating to this infarct. No new area of edema is appreciated. 3. No evidence of hemorrhagic transformation. Findings were discussed with Maynor Carmona RN, at 12:41 pm on 10/6/2022. MRI BRAIN WO CONTRAST   Final Result   Left MARCELO distribution infarct without hemorrhagic transformation. Mild   corresponding T2 FLAIR hyperintensity is identified. Mild to moderate chronic microvascular disease and involutional changes. Scattered areas of chronic lacunar type infarcts are identified both basal   ganglia regions and thalamic regions and left greater than right side of the   thanh. The findings were sent to the Radiology Results [address] at 5:43   pm on 10/3/2022 to be communicated to a licensed caregiver. MRA HEAD WO CONTRAST   Final Result   No definite large vessel occlusion to account for the left MARCELO infarct on the   MRI. MRA NECK WO CONTRAST   Final Result   Negative for hemodynamic stenosis. XR ABDOMEN (KUB) (SINGLE AP VIEW)   Final Result   No radiopaque foreign body is evident. CT HEAD WO CONTRAST   Final Result   1. No acute intracranial bleed. 2. Multifocal age indeterminate lacunar strokes involve right centrum   semiovale, bilateral basal ganglia and left caudate lobe. 3.  White matter hypoattenuation described is typical of microvascular   ischemic disease or as sequela of dysmyelinating/demyelinating processes. 4.  Vascular calcifications are noted reflecting calcific atherosclerosis.          CT CHEST PULMONARY EMBOLISM W CONTRAST   Final Result   No evidence of pulmonary embolism or acute pulmonary abnormality. Mild left   lower lobe atelectatic changes. XR CHEST PORTABLE   Final Result   No radiographic evidence of acute cardiopulmonary disease. Assessment/Plan:    Active Hospital Problems    Diagnosis     Severe malnutrition ([facility] [address] [E43]      Priority: Medium    Altered mental status [R41.82]      Priority: Medium    Hypernatremia [E87.0]      Priority: Medium    Hypokalemia [E87.6]      Priority: Medium    GARRETT (acute kidney injury) ([facility] [address] [N17.9]      Priority: Medium            Acute metabolic encephalopathy - unclear etiology, didn't improve significantly with electrolyte correction. Repeat U/A showed pyuria.  -continue D5W  -nephrology consulted, recs appreciated  -BMP q6h  -neurology consulted, recs appreciated  -check procalcitonin  -check molecular PCR panel including COVID  -MRI Brain without contrast with left MARCELO stroke  -MRA Head and Neck with contrast reviewed  -EEG reviewed, abnormal - cont keppra  -TSH WNL  - discussed with aunt, wants to have PEG tube placed, GI consulted  - hold plavix in anticipation of need for PEG  - NG tube placed, ok to place restraints if pt pulling out NG, started TF     Acute CVA - MRI reviewed, neurology following. On ASA/plavix/statin.    Ordered MARLO and unable to be done 10/7 (friday) since aunt unable to reached on phone for consent  consulted hem/onc for suspected hypercoag work-up per neuro recs - no further work-up per hem/onc     Pyuria - UTI ruled out after further study  -stop ceftriaxone  -urine culture showed mixed jovon only     Hypernatremia - suspect due to severe dehydration, resolved  -off IVF  -nephrology consulted, recs appreciated  -BMP q6h     Hypokalemia  -replace PRN  -trend and monitor  -nephrology following     GARRETT - resolved  - likely prerenal due to severe dehydration, improved  -continue IVF  -nephrology managing fluids  -avoid nephrotoxic medications  -hold home ACEi  -trend BMP  -checked urine studies     Hx CVA  -continue home meds including DAPT and high intensity statin     Hx seizure disorder  -change to IV Keppra and continue  - EEG reviewed     Developmental delay  Sickle cell disease           DVT Prophylaxis: Lovenox  Diet: Diet NPO  ADULT TUBE FEEDING; Nasogastric; Standard with Fiber; Continuous; 25; Yes; 10; Q 6 hours; 45; 100; Q 4 hours  Code Status: Full Code  PT/OT Eval Status: ordered    Cheryle Keep care      Vikki Champagne MD

## 2022-10-10 NOTE — PLAN OF CARE
Pain/discomfort being managed with PRN analgesics per MD orders. Pt able to express presence and absence of pain and rate pain appropriately using numerical scale. Skin assessment completed every shift. Pt assessed for incontinence, appropriate barrier cream applied prn. Pt encouraged to turn/rotate every 2 hours. Assistance provided if pt unable to do so themselves. Fall risk assessment completed every shift. All precautions in place. Pt has call light within reach at all times. Room clear of clutter. Pt aware to call for assistance when getting up. Intake/Output Summary (Last 24 hours) at 10/10/2022 0743  Last data filed at 10/10/2022 6750  Gross per 24 hour   Intake 2044 ml   Output 1140 ml   Net 904 ml     Vitals:    10/10/22 0722   BP: (!) 154/116   Pulse:    Resp:    Temp: 98.4 °F (36.9 °C)   SpO2:      Patient assessed for fall risk; fall precautions initiated. Patient and family instructed about safety devices. Environment kept free of clutter and adequate lighting provided. Bed locked and in lowest position. Call light within reach. Will continue to monitor.

## 2022-10-10 NOTE — PROGRESS NOTES
Hospitalist Progress Note      PCP: Tyrone Gray MD    Date of Admission: 9/29/2022    Subjective: aunt and mother at bedside, pt with no clinical improvement    Medications:  Reviewed    Infusion Medications    sodium chloride 5 mL/hr at 10/05/22 2256     Scheduled Medications    carvedilol  6.25 mg Oral BID WC    levETIRAcetam  750 mg IntraVENous Q12H    sodium chloride flush  5-40 mL IntraVENous 2 times per day    enoxaparin  30 mg SubCUTAneous Daily    mirtazapine  7.5 mg Oral Daily    verapamil  120 mg Oral Nightly    vitamin B-1  100 mg Oral Daily    vitamin B-12  1,000 mcg Oral Daily    Vitamin D  1,000 Units Oral Daily    [Held by provider] clopidogrel  75 mg Oral Daily    atorvastatin  80 mg Oral Nightly    aspirin  81 mg Oral Daily    melatonin  6 mg Oral Nightly    multivitamin  1 tablet Oral Daily     PRN Meds: polyethylene glycol, sodium chloride flush, sodium chloride, ondansetron **OR** ondansetron, acetaminophen **OR** acetaminophen, potassium chloride **OR** potassium alternative oral replacement **OR** potassium chloride      Intake/Output Summary (Last 24 hours) at 10/9/2022 2336  Last data filed at 10/9/2022 1711  Gross per 24 hour   Intake 2813 ml   Output 1490 ml   Net 1323 ml       Physical Exam Performed:    /88   Pulse 88   Temp 99.1 °F (37.3 °C) (Oral)   Resp 20   Ht 5' (1.524 m)   Wt 145 lb 15.1 oz (66.2 kg)   SpO2 99%   BMI 28.50 kg/m²        General appearance: Lethargic, chronically ill appearing  HEENT: Pupils equal, round, and reactive to light. Conjunctivae/corneas clear. Neck: Supple, with full range of motion. No jugular venous distention. Trachea midline. Respiratory:  diminished b/l  Cardiovascular: Regular rate and rhythm with normal S1/S2   Abdomen: Soft, non-tender, non-distended with normal bowel sounds. Musculoskeletal: No clubbing, cyanosis or edema bilaterally. Full range of motion without deformity.   Skin: Skin color, texture, turgor normal. No rashes or lesions. Neurologic:  unable to exam, pt not responsive  Psychiatric: Lethargic. Capillary Refill: Brisk,< 3 seconds   Peripheral Pulses: +2 palpable, equal bilaterally       Labs:   Recent Labs     10/07/22  0555   WBC 5.8   HGB 10.7*   HCT 33.3*        Recent Labs     10/07/22  0555 10/08/22  0556 10/09/22  0910     --   --    K 3.5  --   --      --   --    CO2 19*  --   --    BUN 4*  --   --    CREATININE <0.5*  --   --    CALCIUM 8.8  --   --    PHOS 2.7 3.2 3.2     No results for input(s): AST, ALT, BILIDIR, BILITOT, ALKPHOS in the last 72 hours. No results for input(s): INR in the last 72 hours. No results for input(s): [place] in the last 72 hours. Urinalysis:      Lab Results   Component Value Date/Time    NITRU Negative 10/01/2022 04:05 AM    WBCUA 15 10/01/2022 04:05 AM    BACTERIA 1+ 10/01/2022 04:05 AM    RBCUA 1 10/01/2022 04:05 AM    BLOODU Negative 10/01/2022 04:05 AM    SPECGRAV 1.037 10/01/2022 04:05 AM    GLUCOSEU Negative 10/01/2022 04:05 AM       Radiology:  XR CHEST PORTABLE   Final Result   Status post feeding tube placement which is looped in the distal stomach with   the tip in the fundus. Recommend readjusting the tip into the distal stomach   for more physiologic positioning. Stable mild cardiomegaly with resolving central pulmonary congestion and   slowly resolving left basilar opacity. XR CHEST PORTABLE   Final Result   Nasogastric tube in good position with a non-specific gas pattern. XR CHEST PORTABLE   Final Result   Enteric tube in appropriate position. CTA HEAD NECK W CONTRAST   Final Result   1. Occlusion of the proximal left A2 segment. 2. Mid basilar trunk aneurysm, ventrally directed with the base measuring 4   mm in width, 2.5 mm base to apex measurement. 3. Tortuous appearance of the bilateral distal cervical ICAs. 4. Mild-to-moderate luminal irregularity involving the PCAs.    5. Correlate with concerns for underlying arteriopathy/vasculopathy. CT HEAD WO CONTRAST   Final Result   1. Subacute infarct in the left frontal lobe in the distribution of the MARCELO   characterized on recent CT and MRI. 2. On the current exam, there is no pattern of significant edema or mass   effect relating to this infarct. No new area of edema is appreciated. 3. No evidence of hemorrhagic transformation. Findings were discussed with Tiffany Cain RN, at 12:41 pm on 10/6/2022. MRI BRAIN WO CONTRAST   Final Result   Left MARCELO distribution infarct without hemorrhagic transformation. Mild   corresponding T2 FLAIR hyperintensity is identified. Mild to moderate chronic microvascular disease and involutional changes. Scattered areas of chronic lacunar type infarcts are identified both basal   ganglia regions and thalamic regions and left greater than right side of the   thanh. The findings were sent to the Radiology Results [address] at 5:43   pm on 10/3/2022 to be communicated to a licensed caregiver. MRA HEAD WO CONTRAST   Final Result   No definite large vessel occlusion to account for the left MARCELO infarct on the   MRI. MRA NECK WO CONTRAST   Final Result   Negative for hemodynamic stenosis. XR ABDOMEN (KUB) (SINGLE AP VIEW)   Final Result   No radiopaque foreign body is evident. CT HEAD WO CONTRAST   Final Result   1. No acute intracranial bleed. 2. Multifocal age indeterminate lacunar strokes involve right centrum   semiovale, bilateral basal ganglia and left caudate lobe. 3.  White matter hypoattenuation described is typical of microvascular   ischemic disease or as sequela of dysmyelinating/demyelinating processes. 4.  Vascular calcifications are noted reflecting calcific atherosclerosis. CT CHEST PULMONARY EMBOLISM W CONTRAST   Final Result   No evidence of pulmonary embolism or acute pulmonary abnormality.   Mild left   lower lobe atelectatic changes. XR CHEST PORTABLE   Final Result   No radiographic evidence of acute cardiopulmonary disease. Assessment/Plan:    Active Hospital Problems    Diagnosis     Severe malnutrition ([facility] [address] [E43]      Priority: Medium    Altered mental status [R41.82]      Priority: Medium    Hypernatremia [E87.0]      Priority: Medium    Hypokalemia [E87.6]      Priority: Medium    GARRETT (acute kidney injury) ([facility] [address] [N17.9]      Priority: Medium         Acute metabolic encephalopathy - unclear etiology, didn't improve significantly with electrolyte correction. Repeat U/A showed pyuria.  -continue D5W  -nephrology consulted, recs appreciated  -BMP q6h  -neurology consulted, recs appreciated  -check procalcitonin  -check molecular PCR panel including COVID  -MRI Brain without contrast with left MARCELO stroke  -MRA Head and Neck with contrast reviewed  -EEG reviewed, abnormal - cont keppra  -TSH WNL  - discussed with aunt, wants to have PEG tube placed, GI consulted  - hold plavix in anticipation of need for PEG  - NG tube placed, ok to place restraints if pt pulling out NG, started TF     Acute CVA - MRI reviewed, neurology following. On ASA/plavix/statin.    Ordered MARLO and unable to be done 10/7 (friday) since aunt unable to reached on phone for consent  consulted hem/onc for suspected hypercoag work-up per neuro recs - no further work-up per hem/onc     Pyuria - UTI ruled out after further study  -stop ceftriaxone  -urine culture showed mixed jovon only     Hypernatremia - suspect due to severe dehydration, resolved  -off IVF  -nephrology consulted, recs appreciated  -BMP q6h     Hypokalemia  -replace PRN  -trend and monitor  -nephrology following     GARRETT - resolved  - likely prerenal due to severe dehydration, improved  -continue IVF  -nephrology managing fluids  -avoid nephrotoxic medications  -hold home ACEi  -trend BMP  -checked urine studies     Hx CVA  -continue home meds including DAPT and high intensity statin     Hx seizure disorder  -change to IV Keppra and continue  - EEG reviewed     Developmental delay  Sickle cell disease           DVT Prophylaxis: Lovenox  Diet: Diet NPO  ADULT TUBE FEEDING; Nasogastric; Standard with Fiber; Continuous; 25; Yes; 10; Q 6 hours; 45; 100; Q 4 hours  Code Status: Full Code  PT/OT Eval Status: ordered    Dispo - cont care, aunt and mother at bedside.  Recommended to come to hospital tomorrow by 10am to discuss prognosis with specialists        Kristy Mauro MD

## 2022-10-10 NOTE — CARE COORDINATION
Discharge Planning:    Per chart review, patient to receive PEG tube during this admission; GI consulted and anticoagulation is on hold in anticipation of this procedure. At this time, plans remain for patient to return to [facility] once medically stable for discharge. Palliative Care following; will continue to monitor for updates.     Electronically signed by Olga Ortiz RN on 10/10/2022 at 4:40 PM

## 2022-10-10 NOTE — PROGRESS NOTES
Physical Therapy  Attempt Note  Yoselyn Neville  [id number]  Attempted to see pt this afternoon for PT session. Pt in very deep sleep and unable to be awaken with verbal and tactile stimulation. Will follow up as the schedule permits.    Electronically signed by Loren Gabriel PT on 10/10/2022 at 2:06 PM'

## 2022-10-10 NOTE — PROGRESS NOTES
Comprehensive Nutrition Assessment    Type and Reason for Visit:  Reassess    Nutrition Recommendations/Plan:   Continue Jevity 1.5 @ 45 mL/hr. Flush 100 mL q 4 hrs. Malnutrition Assessment:  Malnutrition Status:  Severe malnutrition (10/06/22 1228)    Context:  Chronic Illness     Findings of the 6 clinical characteristics of malnutrition:  Energy Intake:  75% or less estimated energy requirements for 1 month or longer  Weight Loss:  Greater than 20% over 1 year     Body Fat Loss:  Unable to assess     Muscle Mass Loss:  Unable to assess    Fluid Accumulation:  Unable to assess     Strength:  Not Performed    Nutrition Assessment:    Follow-up. Pt TF running @ goal via NG and tolerating. Noted pt with no BM over the past 8 days, needs bowel regimen. Family to discuss goals of care today. Possible PEG placement. SLP following and continues to recommend NPO. Will continue current TF regimen. Nutrition Related Findings:    trace BLE edema; BM 10/2; +10 L fluid Wound Type: None       Current Nutrition Intake & Therapies:    Average Meal Intake: NPO  Average Supplements Intake: NPO  Diet NPO  ADULT TUBE FEEDING; Nasogastric; Standard with Fiber; Continuous; 25; Yes; 10; Q 6 hours; 45; 100; Q 4 hours  Current Tube Feeding (TF) Orders:  Feeding Route: Nasogastric  Formula: Standard with Fiber  Schedule: Continuous  Feeding Regimen: @ 45 mL/hr  Additives/Modulars: None  Water Flushes: 100 mL q 4 hrs  Current TF & Flush Orders Provides: 900ml TV, 1350 kcals, 57 grams protein, and 684ml free water  Goal TF & Flush Orders Provides: If EN initiated, RD to recommend Jevity 1.5 @ 45ml/hr to provide 900ml TV, 1350 kcals, 57 grams protein, and 684ml free water. (Calculated x 20 hours to account for routine nursing care)    Anthropometric Measures:  Height: 5' (152.4 cm)  Ideal Body Weight (IBW): 100 lbs (45 kg)    Admission Body Weight: 131 lb (59.4 kg)  Current Body Weight: 147 lb (66.7 kg), 147 % IBW.  Weight Source: Bed Scale  Current BMI (kg/m2): 28.7        Weight Adjustment For: No Adjustment                 BMI Categories: Overweight (BMI 25.0-29. 9)    Estimated Daily Nutrient Needs:  Energy Requirements Based On: Kcal/kg  Weight Used for Energy Requirements: Admission  Energy (kcal/day): 6042-2061 (20-25kcal/62kg)  Weight Used for Protein Requirements: Ideal  Protein (g/day): 54-63 (1.2-1.4g/45kg)  Method Used for Fluid Requirements: 1 ml/kcal  Fluid (ml/day): 1 ml/kcal    Nutrition Diagnosis:   Severe malnutrition related to inadequate protein-energy intake as evidenced by Criteria as identified in malnutrition assessment, NPO or clear liquid status due to medical condition    Nutrition Interventions:   Food and/or Nutrient Delivery: Continue Current Tube Feeding  Nutrition Education/Counseling: Education not indicated  Coordination of Nutrition Care: Continue to monitor while inpatient       Goals:  Previous Goal Met: Goal(s) Achieved  Goals: Tolerate nutrition support at goal rate       Nutrition Monitoring and Evaluation:   Behavioral-Environmental Outcomes: None Identified  Food/Nutrient Intake Outcomes: Enteral Nutrition Intake/Tolerance  Physical Signs/Symptoms Outcomes: Meal Time Behavior, Skin, Weight, Fluid Status or Edema, Constipation    Discharge Planning:     Too soon to determine     Wanda Stanford RD, LD  Contact: [phone]

## 2022-10-11 ENCOUNTER — APPOINTMENT (OUTPATIENT)
Dept: CT IMAGING | Age: 57
DRG: 426 | End: 2022-10-11
Payer: MEDICAID

## 2022-10-11 LAB
A/G RATIO: 0.7 (ref 1.1–2.2)
ALBUMIN SERPL-MCNC: 2.6 G/DL (ref 3.4–5)
ALP BLD-CCNC: 63 U/L (ref 40–129)
ALT SERPL-CCNC: 9 U/L (ref 10–40)
ANION GAP SERPL CALCULATED.3IONS-SCNC: 10 MMOL/L (ref 3–16)
AST SERPL-CCNC: 25 U/L (ref 15–37)
BASOPHILS ABSOLUTE: 0 K/UL (ref 0–0.2)
BASOPHILS RELATIVE PERCENT: 0.2 %
BILIRUB SERPL-MCNC: <0.2 MG/DL (ref 0–1)
BUN BLDV-MCNC: 7 MG/DL (ref 7–20)
CALCIUM SERPL-MCNC: 8.8 MG/DL (ref 8.3–10.6)
CHLORIDE BLD-SCNC: 105 MMOL/L (ref 99–110)
CO2: 25 MMOL/L (ref 21–32)
CREAT SERPL-MCNC: <0.5 MG/DL (ref 0.6–1.1)
EOSINOPHILS ABSOLUTE: 0.2 K/UL (ref 0–0.6)
EOSINOPHILS RELATIVE PERCENT: 1.6 %
GFR AFRICAN AMERICAN: >60
GFR NON-AFRICAN AMERICAN: >60
GLUCOSE BLD-MCNC: 100 MG/DL (ref 70–99)
GLUCOSE BLD-MCNC: 103 MG/DL (ref 70–99)
GLUCOSE BLD-MCNC: 106 MG/DL (ref 70–99)
GLUCOSE BLD-MCNC: 107 MG/DL (ref 70–99)
GLUCOSE BLD-MCNC: 108 MG/DL (ref 70–99)
GLUCOSE BLD-MCNC: 110 MG/DL (ref 70–99)
GLUCOSE BLD-MCNC: 98 MG/DL (ref 70–99)
HCT VFR BLD CALC: 31 % (ref 36–48)
HEMOGLOBIN: 10.3 G/DL (ref 12–16)
LYMPHOCYTES ABSOLUTE: 0.8 K/UL (ref 1–5.1)
LYMPHOCYTES RELATIVE PERCENT: 9 %
MAGNESIUM: 1.8 MG/DL (ref 1.8–2.4)
MCH RBC QN AUTO: 24.3 PG (ref 26–34)
MCHC RBC AUTO-ENTMCNC: 33.1 G/DL (ref 31–36)
MCV RBC AUTO: 73.4 FL (ref 80–100)
MONOCYTES ABSOLUTE: 0.5 K/UL (ref 0–1.3)
MONOCYTES RELATIVE PERCENT: 5.2 %
NEUTROPHILS ABSOLUTE: 7.7 K/UL (ref 1.7–7.7)
NEUTROPHILS RELATIVE PERCENT: 84 %
PDW BLD-RTO: 19.8 % (ref 12.4–15.4)
PERFORMED ON: ABNORMAL
PERFORMED ON: NORMAL
PHOSPHORUS: 3.2 MG/DL (ref 2.5–4.9)
PLATELET # BLD: 415 K/UL (ref 135–450)
PMV BLD AUTO: 8.3 FL (ref 5–10.5)
POTASSIUM SERPL-SCNC: 4 MMOL/L (ref 3.5–5.1)
RBC # BLD: 4.23 M/UL (ref 4–5.2)
SODIUM BLD-SCNC: 140 MMOL/L (ref 136–145)
TOTAL PROTEIN: 6.2 G/DL (ref 6.4–8.2)
WBC # BLD: 9.1 K/UL (ref 4–11)

## 2022-10-11 PROCEDURE — 36415 COLL VENOUS BLD VENIPUNCTURE: CPT

## 2022-10-11 PROCEDURE — 80053 COMPREHEN METABOLIC PANEL: CPT

## 2022-10-11 PROCEDURE — 6370000000 HC RX 637 (ALT 250 FOR IP): Performed by: NURSE PRACTITIONER

## 2022-10-11 PROCEDURE — 6360000002 HC RX W HCPCS: Performed by: NURSE PRACTITIONER

## 2022-10-11 PROCEDURE — 84100 ASSAY OF PHOSPHORUS: CPT

## 2022-10-11 PROCEDURE — 6360000002 HC RX W HCPCS: Performed by: INTERNAL MEDICINE

## 2022-10-11 PROCEDURE — 6370000000 HC RX 637 (ALT 250 FOR IP): Performed by: INTERNAL MEDICINE

## 2022-10-11 PROCEDURE — 2580000003 HC RX 258: Performed by: NURSE PRACTITIONER

## 2022-10-11 PROCEDURE — 2060000000 HC ICU INTERMEDIATE R&B

## 2022-10-11 PROCEDURE — 2580000003 HC RX 258: Performed by: INTERNAL MEDICINE

## 2022-10-11 PROCEDURE — 70450 CT HEAD/BRAIN W/O DYE: CPT

## 2022-10-11 PROCEDURE — 85025 COMPLETE CBC W/AUTO DIFF WBC: CPT

## 2022-10-11 PROCEDURE — 83735 ASSAY OF MAGNESIUM: CPT

## 2022-10-11 RX ORDER — SODIUM CHLORIDE 9 MG/ML
INJECTION, SOLUTION INTRAVENOUS PRN
Status: CANCELLED | OUTPATIENT
Start: 2022-10-11

## 2022-10-11 RX ORDER — SODIUM CHLORIDE 0.9 % (FLUSH) 0.9 %
5-40 SYRINGE (ML) INJECTION PRN
Status: CANCELLED | OUTPATIENT
Start: 2022-10-12

## 2022-10-11 RX ORDER — LEVETIRACETAM 500 MG/1
750 TABLET ORAL 2 TIMES DAILY
Status: DISCONTINUED | OUTPATIENT
Start: 2022-10-11 | End: 2022-10-11

## 2022-10-11 RX ORDER — LEVETIRACETAM 100 MG/ML
750 SOLUTION ORAL 2 TIMES DAILY
Status: DISCONTINUED | OUTPATIENT
Start: 2022-10-11 | End: 2022-10-13

## 2022-10-11 RX ORDER — SODIUM CHLORIDE 0.9 % (FLUSH) 0.9 %
5-40 SYRINGE (ML) INJECTION EVERY 12 HOURS SCHEDULED
Status: CANCELLED | OUTPATIENT
Start: 2022-10-12

## 2022-10-11 RX ADMIN — THERA TABS 1 TABLET: TAB at 08:55

## 2022-10-11 RX ADMIN — ASPIRIN 81 MG 81 MG: 81 TABLET ORAL at 08:54

## 2022-10-11 RX ADMIN — Medication 1000 UNITS: at 08:54

## 2022-10-11 RX ADMIN — Medication 100 MG: at 08:55

## 2022-10-11 RX ADMIN — SODIUM CHLORIDE, PRESERVATIVE FREE 10 ML: 5 INJECTION INTRAVENOUS at 21:16

## 2022-10-11 RX ADMIN — MIRTAZAPINE 7.5 MG: 15 TABLET, FILM COATED ORAL at 08:54

## 2022-10-11 RX ADMIN — CARVEDILOL 6.25 MG: 6.25 TABLET, FILM COATED ORAL at 18:43

## 2022-10-11 RX ADMIN — SODIUM CHLORIDE, PRESERVATIVE FREE 10 ML: 5 INJECTION INTRAVENOUS at 12:55

## 2022-10-11 RX ADMIN — ENOXAPARIN SODIUM 30 MG: 100 INJECTION SUBCUTANEOUS at 12:55

## 2022-10-11 RX ADMIN — CARVEDILOL 6.25 MG: 6.25 TABLET, FILM COATED ORAL at 08:54

## 2022-10-11 RX ADMIN — LEVETIRACETAM 750 MG: 100 INJECTION, SOLUTION INTRAVENOUS at 02:30

## 2022-10-11 RX ADMIN — LEVETIRACETAM 750 MG: 100 SOLUTION ORAL at 21:15

## 2022-10-11 RX ADMIN — LEVETIRACETAM 750 MG: 100 SOLUTION ORAL at 14:30

## 2022-10-11 RX ADMIN — ATORVASTATIN CALCIUM 80 MG: 80 TABLET, FILM COATED ORAL at 21:15

## 2022-10-11 RX ADMIN — CYANOCOBALAMIN TAB 1000 MCG 1000 MCG: 1000 TAB at 08:54

## 2022-10-11 RX ADMIN — VERAPAMIL HYDROCHLORIDE 120 MG: 120 TABLET ORAL at 21:15

## 2022-10-11 ASSESSMENT — PAIN SCALES - WONG BAKER
WONGBAKER_NUMERICALRESPONSE: 0

## 2022-10-11 NOTE — PLAN OF CARE
Problem: Discharge Planning  Goal: Discharge to home or other facility with appropriate resources  Outcome: Progressing     Problem: Pain  Goal: Verbalizes/displays adequate comfort level or baseline comfort level  Outcome: Progressing     Problem: Skin/Tissue Integrity  Goal: Absence of new skin breakdown  Description: 1. Monitor for areas of redness and/or skin breakdown  2. Assess vascular access sites hourly  3. Every 4-6 hours minimum:  Change oxygen saturation probe site  4. Every 4-6 hours:  If on nasal continuous positive airway pressure, respiratory therapy assess nares and determine need for appliance change or resting period.   Outcome: Progressing     Problem: Safety - Adult  Goal: Free from fall injury  Outcome: Progressing     Problem: ABCDS Injury Assessment  Goal: Absence of physical injury  Outcome: Progressing     Problem: Neurosensory - Adult  Goal: Achieves stable or improved neurological status  Outcome: Progressing  Goal: Absence of seizures  Outcome: Progressing  Goal: Remains free of injury related to seizures activity  Outcome: Progressing  Goal: Achieves maximal functionality and self care  Outcome: Progressing     Problem: Musculoskeletal - Adult  Goal: Return mobility to safest level of function  Outcome: Progressing  Goal: Maintain proper alignment of affected body part  Outcome: Progressing  Goal: Return ADL status to a safe level of function  Outcome: Progressing     Problem: Genitourinary - Adult  Goal: Absence of urinary retention  Outcome: Progressing     Problem: Metabolic/Fluid and Electrolytes - Adult  Goal: Electrolytes maintained within normal limits  Outcome: Progressing  Goal: Hemodynamic stability and optimal renal function maintained  Outcome: Progressing  Goal: Glucose maintained within prescribed range  Outcome: Progressing     Problem: Respiratory - Adult  Goal: Achieves optimal ventilation and oxygenation  Outcome: Progressing     Problem: Cardiovascular - Adult  Goal: Maintains optimal cardiac output and hemodynamic stability  Outcome: Progressing  Goal: Absence of cardiac dysrhythmias or at baseline  Outcome: Progressing     Problem: Gastrointestinal - Adult  Goal: Maintains or returns to baseline bowel function  Outcome: Progressing  Goal: Maintains adequate nutritional intake  Outcome: Progressing     Problem: Nutrition Deficit:  Goal: Optimize nutritional status  Outcome: Progressing

## 2022-10-11 NOTE — PROGRESS NOTES
Speech Language Pathology    10:45 AM:  Dysphagia tx attempted. Patient unable to be awakened even with use of sternal rub. ST to re-attempt as schedule permits unless otherwise notified. 1:23 PM:  Dysphagia tx re-attempted. Patient remains unable ot be awakened even with use of sternal rub. ST to re-attempt later date unless otherwise notified. Concern for persistent reduced level of alertness from prior visits last week and potentially additionally declined status. Concern for limited skilled ST benefit if status remains unchanged. Thank you. Madelaine Zelaya [facility], [address] [facility], [id number]  Speech-Language Pathologist  Portable phone: [phone]

## 2022-10-11 NOTE — PROGRESS NOTES
Progress Note    Updates  No significant events overnight. Mental status remains poor. Current Facility-Administered Medications:   carvedilol (COREG) tablet 6.25 mg, 6.25 mg, Oral, BID WC  polyethylene glycol (GLYCOLAX) packet 17 g, 17 g, Oral, Daily PRN  levETIRAcetam (KEPPRA) 750 mg in sodium chloride 0.9 % 100 mL IVPB, 750 mg, IntraVENous, Q12H  sodium chloride flush 0.9 % injection 5-40 mL, 5-40 mL, IntraVENous, 2 times per day  sodium chloride flush 0.9 % injection 5-40 mL, 5-40 mL, IntraVENous, PRN  0.9 % sodium chloride infusion, , IntraVENous, PRN  ondansetron (ZOFRAN-ODT) disintegrating tablet 4 mg, 4 mg, Oral, Q8H PRN **OR** ondansetron (ZOFRAN) injection 4 mg, 4 mg, IntraVENous, Q6H PRN  acetaminophen (TYLENOL) tablet 650 mg, 650 mg, Oral, Q6H PRN **OR** acetaminophen (TYLENOL) suppository 650 mg, 650 mg, Rectal, Q6H PRN  enoxaparin Sodium (LOVENOX) injection 30 mg, 30 mg, SubCUTAneous, Daily  mirtazapine (REMERON) tablet 7.5 mg, 7.5 mg, Oral, Daily  verapamil (CALAN) tablet 120 mg, 120 mg, Oral, Nightly  thiamine mononitrate tablet 100 mg, 100 mg, Oral, Daily  vitamin B-12 (CYANOCOBALAMIN) tablet 1,000 mcg, 1,000 mcg, Oral, Daily  vitamin D (CHOLECALCIFEROL) tablet 1,000 Units, 1,000 Units, Oral, Daily  [Held by provider] clopidogrel (PLAVIX) tablet 75 mg, 75 mg, Oral, Daily  atorvastatin (LIPITOR) tablet 80 mg, 80 mg, Oral, Nightly  aspirin chewable tablet 81 mg, 81 mg, Oral, Daily  melatonin tablet 6 mg, 6 mg, Oral, Nightly  multivitamin 1 tablet, 1 tablet, Oral, Daily  potassium chloride (KLOR-CON M) extended release tablet 40 mEq, 40 mEq, Oral, PRN **OR** potassium bicarb-citric acid (EFFER-K) effervescent tablet 40 mEq, 40 mEq, Oral, PRN **OR** potassium chloride 10 mEq/100 mL IVPB (Peripheral Line), 10 mEq, IntraVENous, PRN    Exam  Blood pressure (!) 143/102, pulse 100, temperature 99.3 °F (37.4 °C), temperature source Axillary, resp.  rate 18, height 5' (1.524 m), weight 148 lb 2.4 oz (67.2 kg), SpO2 99 %. Constitutional    Vital signs: BP, HR, and RR reviewed   General no distress. Eyes: unable to visualize the fundi  Cardiovascular: no peripheral edema. Psychiatric: no psychotic behavior noted. Neurologic  Mental status:   orientation non verbal.     Attention poor   Language non verbal.     Comprehension she is not following any commands at this time. Cranial nerves:   CN2: corneal reflexes are present. CN 3,4,6: no forced gaze deviation today. Pupils are equal round reactive bilaterally. CN7: face symmetric   CN8: hearing fortino. CN12: fortino. Strength: challenging portion of exam given the patient's inability to interact. Sensory: she grimaces to trapezius pressure bilaterally and pain stimulus in all 4 limbs though L>R. Cerebellar/coordination: finger nose finger fortino. Tone: decreased RUE/RLE. Gait: deferred at this time given clinical condition. ROS - chart reviewed. Afebrile. Labs  Folate 5.10  B12 - 1724    LDL 97  HgA1c 5.4    WBC 9.1K    UA small LE, 15 WBC, 1+ bacteria    Studies  CT head w/o 10/6/22  Impression   1. Subacute infarct in the left frontal lobe in the distribution of the MARCELO   characterized on recent CT and MRI. 2. On the current exam, there is no pattern of significant edema or mass   effect relating to this infarct. No new area of edema is appreciated. 3. No evidence of hemorrhagic transformation. CTA head/neck 10/6/22  Impression   1. Occlusion of the proximal left A2 segment. 2. Mid basilar trunk aneurysm, ventrally directed with the base measuring 4   mm in width, 2.5 mm base to apex measurement. 3. Tortuous appearance of the bilateral distal cervical ICAs. 4. Mild-to-moderate luminal irregularity involving the PCAs. 5. Correlate with concerns for underlying arteriopathy/vasculopathy. MRI brain w/o 10/3/22  Impression   Left MARCELO distribution infarct without hemorrhagic transformation.   Mild   corresponding T2 FLAIR hyperintensity is identified. Mild to moderate chronic microvascular disease and involutional changes. Scattered areas of chronic lacunar type infarcts are identified both basal   ganglia regions and thalamic regions and left greater than right side of the   thanh. MRA head/neck 10/3/22  No hemodynamically significant stenosis. EEG 10/3/22  Occasional sharp waves in the left temporal region, which appear most likely  consistent with focal epileptiform discharges as well as mild background  slowing and disorganization. No recorded seizures. Repeat EEG 10/5/22  Bilateral, often independent sharp waves in the left greater than right temporal regions. Bitemporal and bifrontal slowing, right somewhat greater than left. Generalized background slowing and disorganization. No recorded seizures. Impression/Recommendations  Embolic appearing stroke (L MARCELO, L cerebellar). She did just have a recent stroke in June (L subcortical, L thanh). A MARLO was recommended a week ago. This is still not complete. Her RN says tells me today that this is now going to be done tomorrow. IF THIS IS NOT GOING TO GET DONE AND KEEPS GETTING PUSHED BACK THEN SHE NEEDS A COMPLETE TTE. Continue on telemetry to monitor for atrial fibrillation/flutter. Continue antiplatelets/statin. She does have a hx of seizures. EEGs have been w/out any recorded seizures. She is on  mg BID. She remains encephalopathic, superimposed on baseline developmental delay. We will repeat a CT head w/o. We will continue to follow.       Michelle Vaz NP  [address] [place] [address] [address] Neurology    A copy of this note was provided for Dr Moon Kay MD

## 2022-10-11 NOTE — PROGRESS NOTES
Hospitalist Progress Note      PCP: Ana Stoddard MD    Date of Admission: 9/29/2022    Subjective: tolerating tube feeds, no acute events, no improvement in mental status    Medications:  Reviewed    Infusion Medications    sodium chloride 5 mL/hr at 10/05/22 4856     Scheduled Medications    levETIRAcetam  750 mg Oral BID    carvedilol  6.25 mg Oral BID WC    sodium chloride flush  5-40 mL IntraVENous 2 times per day    enoxaparin  30 mg SubCUTAneous Daily    mirtazapine  7.5 mg Oral Daily    verapamil  120 mg Oral Nightly    vitamin B-1  100 mg Oral Daily    vitamin B-12  1,000 mcg Oral Daily    Vitamin D  1,000 Units Oral Daily    [Held by provider] clopidogrel  75 mg Oral Daily    atorvastatin  80 mg Oral Nightly    aspirin  81 mg Oral Daily    melatonin  6 mg Oral Nightly    multivitamin  1 tablet Oral Daily     PRN Meds: polyethylene glycol, sodium chloride flush, sodium chloride, ondansetron **OR** ondansetron, acetaminophen **OR** acetaminophen, potassium chloride **OR** potassium alternative oral replacement **OR** potassium chloride      Intake/Output Summary (Last 24 hours) at 10/11/2022 1409  Last data filed at 10/11/2022 8147  Gross per 24 hour   Intake 1449 ml   Output 1600 ml   Net -151 ml       Physical Exam Performed:    BP (!) 143/102   Pulse 100   Temp 98.3 °F (36.8 °C) (Axillary)   Resp 18   Ht 5' (1.524 m)   Wt 148 lb 2.4 oz (67.2 kg)   SpO2 100%   BMI 28.93 kg/m²     General appearance: Lethargic, chronically ill appearing  HEENT: Pupils equal, round, and reactive to light. Conjunctivae/corneas clear. Neck: Supple, with full range of motion. No jugular venous distention. Trachea midline. Respiratory:  diminished b/l  Cardiovascular: Regular rate and rhythm with normal S1/S2   Abdomen: Soft, non-tender, non-distended with normal bowel sounds. Musculoskeletal: No clubbing, cyanosis or edema bilaterally. Full range of motion without deformity.   Skin: Skin color, texture, turgor normal.  No rashes or lesions. Neurologic:  unable to exam, pt not responsive  Psychiatric: Lethargic. Capillary Refill: Brisk,< 3 seconds   Peripheral Pulses: +2 palpable, equal bilaterally    Labs:   Recent Labs     10/10/22  1320 10/11/22  0518   WBC 8.9 9.1   HGB 11.2* 10.3*   HCT 35.3* 31.0*    415     Recent Labs     10/09/22  0910 10/10/22  0604 10/10/22  1320 10/11/22  0518   NA  --   --  143 140   K  --   --  4.7 4.0   CL  --   --  108 105   CO2  --   --  25 25   BUN  --   --  7 7   CREATININE  --   --  <0.5* <0.5*   CALCIUM  --   --  8.8 8.8   PHOS 3.2 2.8  --  3.2     Recent Labs     10/10/22  1320 10/11/22  0518   AST 26 25   ALT 10 9*   BILITOT <0.2 <0.2   ALKPHOS 65 63     No results for input(s): INR in the last 72 hours. No results for input(s): [place] in the last 72 hours. Urinalysis:      Lab Results   Component Value Date/Time    NITRU Negative 10/01/2022 04:05 AM    WBCUA 15 10/01/2022 04:05 AM    BACTERIA 1+ 10/01/2022 04:05 AM    RBCUA 1 10/01/2022 04:05 AM    BLOODU Negative 10/01/2022 04:05 AM    SPECGRAV 1.037 10/01/2022 04:05 AM    GLUCOSEU Negative 10/01/2022 04:05 AM       Radiology:  XR CHEST PORTABLE   Final Result   Status post feeding tube placement which is looped in the distal stomach with   the tip in the fundus. Recommend readjusting the tip into the distal stomach   for more physiologic positioning. Stable mild cardiomegaly with resolving central pulmonary congestion and   slowly resolving left basilar opacity. XR CHEST PORTABLE   Final Result   Nasogastric tube in good position with a non-specific gas pattern. XR CHEST PORTABLE   Final Result   Enteric tube in appropriate position. CTA HEAD NECK W CONTRAST   Final Result   1. Occlusion of the proximal left A2 segment. 2. Mid basilar trunk aneurysm, ventrally directed with the base measuring 4   mm in width, 2.5 mm base to apex measurement.    3. Tortuous appearance of the bilateral distal cervical ICAs. 4. Mild-to-moderate luminal irregularity involving the PCAs. 5. Correlate with concerns for underlying arteriopathy/vasculopathy. CT HEAD WO CONTRAST   Final Result   1. Subacute infarct in the left frontal lobe in the distribution of the MARCELO   characterized on recent CT and MRI. 2. On the current exam, there is no pattern of significant edema or mass   effect relating to this infarct. No new area of edema is appreciated. 3. No evidence of hemorrhagic transformation. Findings were discussed with Nando Graham RN, at 12:41 pm on 10/6/2022. MRI BRAIN WO CONTRAST   Final Result   Left MARCELO distribution infarct without hemorrhagic transformation. Mild   corresponding T2 FLAIR hyperintensity is identified. Mild to moderate chronic microvascular disease and involutional changes. Scattered areas of chronic lacunar type infarcts are identified both basal   ganglia regions and thalamic regions and left greater than right side of the   thanh. The findings were sent to the Radiology Results [address] at 5:43   pm on 10/3/2022 to be communicated to a licensed caregiver. MRA HEAD WO CONTRAST   Final Result   No definite large vessel occlusion to account for the left MARCELO infarct on the   MRI. MRA NECK WO CONTRAST   Final Result   Negative for hemodynamic stenosis. XR ABDOMEN (KUB) (SINGLE AP VIEW)   Final Result   No radiopaque foreign body is evident. CT HEAD WO CONTRAST   Final Result   1. No acute intracranial bleed. 2. Multifocal age indeterminate lacunar strokes involve right centrum   semiovale, bilateral basal ganglia and left caudate lobe. 3.  White matter hypoattenuation described is typical of microvascular   ischemic disease or as sequela of dysmyelinating/demyelinating processes. 4.  Vascular calcifications are noted reflecting calcific atherosclerosis.          CT CHEST PULMONARY EMBOLISM W CONTRAST   Final Result   No evidence of pulmonary embolism or acute pulmonary abnormality. Mild left   lower lobe atelectatic changes. XR CHEST PORTABLE   Final Result   No radiographic evidence of acute cardiopulmonary disease. CT HEAD WO CONTRAST    (Results Pending)           Assessment/Plan:    Active Hospital Problems    Diagnosis     Severe malnutrition ([facility] [address] [E43]      Priority: Medium    Altered mental status [R41.82]      Priority: Medium    Hypernatremia [E87.0]      Priority: Medium    Hypokalemia [E87.6]      Priority: Medium    GARRETT (acute kidney injury) ([facility] [address] [N17.9]      Priority: Medium          Acute metabolic encephalopathy - unclear etiology, didn't improve significantly with electrolyte correction. Repeat U/A showed pyuria.  -continue D5W  -nephrology consulted, recs appreciated  -BMP q6h  -neurology consulted, recs appreciated  -check procalcitonin  -check molecular PCR panel including COVID  -MRI Brain without contrast with left MARCELO stroke  -MRA Head and Neck with contrast reviewed  -EEG reviewed, abnormal - cont keppra  -TSH WNL  - discussed with aunt, wants to have PEG tube placed, GI consulted  - hold plavix in anticipation of need for PEG, discussed with aunt the risks of further stroke given holding plavix, she agreed to hold plavix to consider PEG tube  - NG tube placed, ok to place restraints if pt pulling out NG, started TF     Acute CVA - MRI reviewed, neurology following. On ASA/plavix/statin.    Ordered MARLO and unable to be done 10/7 (friday) since aunt unable to reached on phone for consent, plan MARLO tomorrow  consulted hem/onc for suspected hypercoag work-up per neuro recs - no further work-up per hem/onc     Pyuria - UTI ruled out after further study  -stop ceftriaxone  -urine culture showed mixed jovon only     Hypernatremia - suspect due to severe dehydration, resolved  -off IVF  -nephrology consulted, recs appreciated  -BMP q6h     Hypokalemia  -replace PRN  -trend and monitor  -nephrology following     GARRETT - resolved  - likely prerenal due to severe dehydration, improved  -continue IVF  -nephrology managing fluids  -avoid nephrotoxic medications  -hold home ACEi  -trend BMP  -checked urine studies     Hx CVA  -continue home meds including DAPT and high intensity statin     Hx seizure disorder  -change to IV Keppra and continue  - EEG reviewed     Developmental delay  Sickle cell disease           DVT Prophylaxis: Lovenox  Diet: Diet NPO  ADULT TUBE FEEDING; Nasogastric; Standard with Fiber; Continuous; 25; Yes; 10; Q 6 hours; 45; 100; Q 4 hours  Code Status: Full Code  PT/OT Eval Status: ordered    Dispo - cont care, guarded prognosis      Richar Veras MD

## 2022-10-11 NOTE — PROGRESS NOTES
During night patient remained minimally responsive/ nonverbal only opening eyes to sternal rub and not able to maintain eye contact with RN, patient continues to not follow commands.  Tube feed continued during night and set changed in AM. Sats remained WNL

## 2022-10-12 ENCOUNTER — HOSPITAL ENCOUNTER (INPATIENT)
Dept: CARDIAC CATH/INVASIVE PROCEDURES | Age: 57
Discharge: HOME | DRG: 426 | End: 2022-10-12
Payer: MEDICAID

## 2022-10-12 LAB
A/G RATIO: 0.8 (ref 1.1–2.2)
ALBUMIN SERPL-MCNC: 2.8 G/DL (ref 3.4–5)
ALP BLD-CCNC: 56 U/L (ref 40–129)
ALT SERPL-CCNC: 10 U/L (ref 10–40)
ANION GAP SERPL CALCULATED.3IONS-SCNC: 10 MMOL/L (ref 3–16)
AST SERPL-CCNC: 21 U/L (ref 15–37)
BASOPHILS ABSOLUTE: 0 K/UL (ref 0–0.2)
BASOPHILS RELATIVE PERCENT: 0.4 %
BILIRUB SERPL-MCNC: <0.2 MG/DL (ref 0–1)
BUN BLDV-MCNC: 7 MG/DL (ref 7–20)
C-REACTIVE PROTEIN: 21.3 MG/L (ref 0–5.1)
CALCIUM SERPL-MCNC: 8.8 MG/DL (ref 8.3–10.6)
CHLORIDE BLD-SCNC: 104 MMOL/L (ref 99–110)
CO2: 25 MMOL/L (ref 21–32)
CREAT SERPL-MCNC: <0.5 MG/DL (ref 0.6–1.1)
EOSINOPHILS ABSOLUTE: 0.2 K/UL (ref 0–0.6)
EOSINOPHILS RELATIVE PERCENT: 2.8 %
GFR AFRICAN AMERICAN: >60
GFR NON-AFRICAN AMERICAN: >60
GLUCOSE BLD-MCNC: 103 MG/DL (ref 70–99)
GLUCOSE BLD-MCNC: 105 MG/DL (ref 70–99)
GLUCOSE BLD-MCNC: 86 MG/DL (ref 70–99)
GLUCOSE BLD-MCNC: 88 MG/DL (ref 70–99)
GLUCOSE BLD-MCNC: 89 MG/DL (ref 70–99)
GLUCOSE BLD-MCNC: 98 MG/DL (ref 70–99)
HCT VFR BLD CALC: 30.8 % (ref 36–48)
HEMOGLOBIN: 10 G/DL (ref 12–16)
LYMPHOCYTES ABSOLUTE: 0.8 K/UL (ref 1–5.1)
LYMPHOCYTES RELATIVE PERCENT: 11.2 %
MAGNESIUM: 1.7 MG/DL (ref 1.8–2.4)
MCH RBC QN AUTO: 24 PG (ref 26–34)
MCHC RBC AUTO-ENTMCNC: 32.5 G/DL (ref 31–36)
MCV RBC AUTO: 73.9 FL (ref 80–100)
MONOCYTES ABSOLUTE: 0.4 K/UL (ref 0–1.3)
MONOCYTES RELATIVE PERCENT: 5.1 %
NEUTROPHILS ABSOLUTE: 5.7 K/UL (ref 1.7–7.7)
NEUTROPHILS RELATIVE PERCENT: 80.5 %
PDW BLD-RTO: 20.4 % (ref 12.4–15.4)
PERFORMED ON: ABNORMAL
PERFORMED ON: ABNORMAL
PERFORMED ON: NORMAL
PHOSPHORUS: 3.5 MG/DL (ref 2.5–4.9)
PLATELET # BLD: 423 K/UL (ref 135–450)
PMV BLD AUTO: 8.1 FL (ref 5–10.5)
POTASSIUM SERPL-SCNC: 4.3 MMOL/L (ref 3.5–5.1)
RBC # BLD: 4.17 M/UL (ref 4–5.2)
SEDIMENTATION RATE, ERYTHROCYTE: 101 MM/HR (ref 0–30)
SODIUM BLD-SCNC: 139 MMOL/L (ref 136–145)
TOTAL PROTEIN: 6.2 G/DL (ref 6.4–8.2)
WBC # BLD: 7.1 K/UL (ref 4–11)

## 2022-10-12 PROCEDURE — 6370000000 HC RX 637 (ALT 250 FOR IP): Performed by: INTERNAL MEDICINE

## 2022-10-12 PROCEDURE — 84100 ASSAY OF PHOSPHORUS: CPT

## 2022-10-12 PROCEDURE — 86140 C-REACTIVE PROTEIN: CPT

## 2022-10-12 PROCEDURE — 36415 COLL VENOUS BLD VENIPUNCTURE: CPT

## 2022-10-12 PROCEDURE — 93312 ECHO TRANSESOPHAGEAL: CPT

## 2022-10-12 PROCEDURE — 9990000010 HC NO CHARGE VISIT

## 2022-10-12 PROCEDURE — 2580000003 HC RX 258

## 2022-10-12 PROCEDURE — 83735 ASSAY OF MAGNESIUM: CPT

## 2022-10-12 PROCEDURE — 85652 RBC SED RATE AUTOMATED: CPT

## 2022-10-12 PROCEDURE — 6370000000 HC RX 637 (ALT 250 FOR IP)

## 2022-10-12 PROCEDURE — 6370000000 HC RX 637 (ALT 250 FOR IP): Performed by: NURSE PRACTITIONER

## 2022-10-12 PROCEDURE — B24BZZ4 ULTRASONOGRAPHY OF HEART WITH AORTA, TRANSESOPHAGEAL: ICD-10-PCS | Performed by: INTERNAL MEDICINE

## 2022-10-12 PROCEDURE — 2580000003 HC RX 258: Performed by: INTERNAL MEDICINE

## 2022-10-12 PROCEDURE — 99152 MOD SED SAME PHYS/QHP 5/>YRS: CPT

## 2022-10-12 PROCEDURE — 6360000002 HC RX W HCPCS: Performed by: INTERNAL MEDICINE

## 2022-10-12 PROCEDURE — 6360000002 HC RX W HCPCS

## 2022-10-12 PROCEDURE — 85025 COMPLETE CBC W/AUTO DIFF WBC: CPT

## 2022-10-12 PROCEDURE — 2060000000 HC ICU INTERMEDIATE R&B

## 2022-10-12 PROCEDURE — 80053 COMPREHEN METABOLIC PANEL: CPT

## 2022-10-12 PROCEDURE — 93325 DOPPLER ECHO COLOR FLOW MAPG: CPT

## 2022-10-12 RX ADMIN — ATORVASTATIN CALCIUM 80 MG: 80 TABLET, FILM COATED ORAL at 22:15

## 2022-10-12 RX ADMIN — Medication 1000 UNITS: at 11:41

## 2022-10-12 RX ADMIN — THERA TABS 1 TABLET: TAB at 11:41

## 2022-10-12 RX ADMIN — SODIUM CHLORIDE, PRESERVATIVE FREE 10 ML: 5 INJECTION INTRAVENOUS at 11:42

## 2022-10-12 RX ADMIN — Medication 6 MG: at 22:15

## 2022-10-12 RX ADMIN — CARVEDILOL 6.25 MG: 6.25 TABLET, FILM COATED ORAL at 11:41

## 2022-10-12 RX ADMIN — SODIUM CHLORIDE, PRESERVATIVE FREE 10 ML: 5 INJECTION INTRAVENOUS at 22:58

## 2022-10-12 RX ADMIN — LEVETIRACETAM 750 MG: 100 SOLUTION ORAL at 11:41

## 2022-10-12 RX ADMIN — LEVETIRACETAM 750 MG: 100 SOLUTION ORAL at 22:15

## 2022-10-12 RX ADMIN — MIRTAZAPINE 7.5 MG: 15 TABLET, FILM COATED ORAL at 11:41

## 2022-10-12 RX ADMIN — CARVEDILOL 6.25 MG: 6.25 TABLET, FILM COATED ORAL at 17:41

## 2022-10-12 RX ADMIN — VERAPAMIL HYDROCHLORIDE 120 MG: 120 TABLET ORAL at 22:15

## 2022-10-12 RX ADMIN — ENOXAPARIN SODIUM 30 MG: 100 INJECTION SUBCUTANEOUS at 11:40

## 2022-10-12 RX ADMIN — ACETAMINOPHEN 650 MG: 325 TABLET ORAL at 22:34

## 2022-10-12 RX ADMIN — ASPIRIN 81 MG 81 MG: 81 TABLET ORAL at 11:41

## 2022-10-12 RX ADMIN — Medication 100 MG: at 11:41

## 2022-10-12 RX ADMIN — CYANOCOBALAMIN TAB 1000 MCG 1000 MCG: 1000 TAB at 11:41

## 2022-10-12 ASSESSMENT — PAIN SCALES - WONG BAKER
WONGBAKER_NUMERICALRESPONSE: 0

## 2022-10-12 NOTE — PROGRESS NOTES
Physical Therapy    10/12/2022  Yajaira Casiano  [id number]  [id number]    Per discussion with MICHAUD, She attempted to awaken patient for co treat session, and unable. Will attempt Cotx tomorrow 10-13-22 as patient able to participate.   Electronically signed by Edwina Guaman, [address] [place] [address] [phone]  on 10/12/2022 at 3:02 PM

## 2022-10-12 NOTE — PROGRESS NOTES
Progress Note    Updates  No changes  Had MARLO this morning. Current Facility-Administered Medications:   levETIRAcetam (KEPPRA) 100 MG/ML solution 750 mg, 750 mg, Oral, BID  carvedilol (COREG) tablet 6.25 mg, 6.25 mg, Oral, BID WC  polyethylene glycol (GLYCOLAX) packet 17 g, 17 g, Oral, Daily PRN  sodium chloride flush 0.9 % injection 5-40 mL, 5-40 mL, IntraVENous, 2 times per day  sodium chloride flush 0.9 % injection 5-40 mL, 5-40 mL, IntraVENous, PRN  0.9 % sodium chloride infusion, , IntraVENous, PRN  ondansetron (ZOFRAN-ODT) disintegrating tablet 4 mg, 4 mg, Oral, Q8H PRN **OR** ondansetron (ZOFRAN) injection 4 mg, 4 mg, IntraVENous, Q6H PRN  acetaminophen (TYLENOL) tablet 650 mg, 650 mg, Oral, Q6H PRN **OR** acetaminophen (TYLENOL) suppository 650 mg, 650 mg, Rectal, Q6H PRN  enoxaparin Sodium (LOVENOX) injection 30 mg, 30 mg, SubCUTAneous, Daily  mirtazapine (REMERON) tablet 7.5 mg, 7.5 mg, Oral, Daily  verapamil (CALAN) tablet 120 mg, 120 mg, Oral, Nightly  thiamine mononitrate tablet 100 mg, 100 mg, Oral, Daily  vitamin B-12 (CYANOCOBALAMIN) tablet 1,000 mcg, 1,000 mcg, Oral, Daily  vitamin D (CHOLECALCIFEROL) tablet 1,000 Units, 1,000 Units, Oral, Daily  [Held by provider] clopidogrel (PLAVIX) tablet 75 mg, 75 mg, Oral, Daily  atorvastatin (LIPITOR) tablet 80 mg, 80 mg, Oral, Nightly  aspirin chewable tablet 81 mg, 81 mg, Oral, Daily  melatonin tablet 6 mg, 6 mg, Oral, Nightly  multivitamin 1 tablet, 1 tablet, Oral, Daily  potassium chloride (KLOR-CON M) extended release tablet 40 mEq, 40 mEq, Oral, PRN **OR** potassium bicarb-citric acid (EFFER-K) effervescent tablet 40 mEq, 40 mEq, Oral, PRN **OR** potassium chloride 10 mEq/100 mL IVPB (Peripheral Line), 10 mEq, IntraVENous, PRN    Exam  Blood pressure (!) 144/108, pulse 90, temperature 98.6 °F (37 °C), temperature source Axillary, resp. rate 16, height 5' (1.524 m), weight 143 lb 11.8 oz (65.2 kg), SpO2 99 %.   Constitutional    Vital signs: BP, HR, and RR reviewed   General no distress. Eyes: unable to visualize the fundi  Cardiovascular: no peripheral edema. Psychiatric: no psychotic behavior noted. Neurologic  Mental status:   orientation non verbal.     Attention poor   Language non verbal.     Comprehension she is not following any commands at this time. Cranial nerves:   CN2: corneal reflexes difficult to assess. CN 3,4,6: tends to have R gaze preference. With passive motion of head to midline her gaze seem to become conjugate. Pupils are equal, round, reactive bilaterally. CN7: face symmetric   CN8: hearing fortino. CN12: fortino. Strength: fortino. Sensory: she grimaces to trapezius pressure bilaterally and pain stimulus in all 4 limbs though L>R. Cerebellar/coordination: finger nose finger fortino. Tone: decreased RUE/RLE. Gait: deferred at this time given clinical condition. ROS - chart reviewed. Afebrile. Labs  Folate 5.10  B12 - 1724    LDL 97  HgA1c 5.4    WBC 7.1K    UA small LE, 15 WBC, 1+ bacteria    Studies  CT head w/o 10/11/22, independently reviewed  Impression   Subacute infarction in the parasagittal left frontal lobe. Old lacunar infarcts in the central thanh, bilateral basal ganglia and   bilateral thalami. Mild parenchymal volume loss. Moderate chronic microvascular disease. CT head w/o 10/6/22  Impression   1. Subacute infarct in the left frontal lobe in the distribution of the MARCELO   characterized on recent CT and MRI. 2. On the current exam, there is no pattern of significant edema or mass   effect relating to this infarct. No new area of edema is appreciated. 3. No evidence of hemorrhagic transformation. CTA head/neck 10/6/22  Impression   1. Occlusion of the proximal left A2 segment. 2. Mid basilar trunk aneurysm, ventrally directed with the base measuring 4   mm in width, 2.5 mm base to apex measurement. 3. Tortuous appearance of the bilateral distal cervical ICAs.    4. Mild-to-moderate luminal irregularity involving the PCAs. 5. Correlate with concerns for underlying arteriopathy/vasculopathy. MRI brain w/o 10/3/22  Impression   Left MARCELO distribution infarct without hemorrhagic transformation. Mild   corresponding T2 FLAIR hyperintensity is identified. Mild to moderate chronic microvascular disease and involutional changes. Scattered areas of chronic lacunar type infarcts are identified both basal   ganglia regions and thalamic regions and left greater than right side of the   thanh. MRA head/neck 10/3/22  No hemodynamically significant stenosis. EEG 10/3/22  Occasional sharp waves in the left temporal region, which appear most likely  consistent with focal epileptiform discharges as well as mild background  slowing and disorganization. No recorded seizures. Repeat EEG 10/5/22  Bilateral, often independent sharp waves in the left greater than right temporal regions. Bitemporal and bifrontal slowing, right somewhat greater than left. Generalized background slowing and disorganization. No recorded seizures. Impression/Recommendations  Embolic appearing stroke (L MARCELO, L cerebellar). She did just have a recent stroke in June (L subcortical, L thanh). The etiology is unclear. Hematology has reviewed hypercoagulable studies and did not feel there were any relevant abnormalities. She has had a MARLO today which per cardiology note was unremarkable. There has been no documented atrial fibrillation/flutter on telemetry. Per chart, it seems like over the past 6 months or so the patient has been declining, functionally and cognitively. She does have baseline developmental delay and a hx of neurofibromatosis. During her workup for her recent stroke in June at [facility], there was some consideration for vasculitis given her overall clinical picture though it seems ultimately this was felt to be less likely.       Continue DAPT/statin for stroke prophylaxis. She has had abnormal EEGs though no recorded seizures this admission. She has been maintained on LEV here, 750 mg BID. Given no improvement in her mental status, it would be reasonable to transfer this patient for cvEEG to definitively rule out underlying seizure activity.   The plan of care was discussed w/ my attending neurologist and hospitalist.      Kelly Walton NP  [address] [place] [address] [address] Neurology    A copy of this note was provided for Dr Andre Longoria MD

## 2022-10-12 NOTE — PROGRESS NOTES
S/p MARLO  No LAMBERT thrombus   No shunt   Structurally normal       See full report for details    Fabrizio Rodríguez MD [facility] [facility], Interventional Cardiology, and Peripheral Vascular [address] [facility]   (O): [phone]  (F): [phone]

## 2022-10-12 NOTE — PROGRESS NOTES
Speech Language Pathology    Dysphagia tx attempted. Patient unavailable out of room at procedure. ST to re-attempt as schedule permits unless otherwise notified. Thank you. Madelaine Love, [address] [facility], [id number]  Speech-Language Pathologist  Portable phone: [phone]

## 2022-10-12 NOTE — PROGRESS NOTES
INPATIENT PROGRESS NOTE        IDENTIFYING DATA/REASON FOR CONSULTATION   PATIENT:  Yajaira Casiano  MRN:  [id number]  ADMIT DATE: 2022  TIME OF EVALUATION: 10/12/2022 12:38 PM  HOSPITAL STAY:   LOS: 12 days   CONSULTING PHYSICIAN: Pooja Theodore MD   REASON FOR CONSULTATION: PEG tube    Subjective:    Patient seen in follow up. Remains poorly responsive. Does not follow commands. Responds to pain stimuli only. Receiving TFings via NGT and tolerating. Remains full code. Last dose of plavix 10/7. MARLO today neg for LAMBERT thrombus    MEDICATIONS   SCHEDULED:  levETIRAcetam, 750 mg, BID  carvedilol, 6.25 mg, BID WC  sodium chloride flush, 5-40 mL, 2 times per day  enoxaparin, 30 mg, Daily  mirtazapine, 7.5 mg, Daily  verapamil, 120 mg, Nightly  vitamin B-1, 100 mg, Daily  vitamin B-12, 1,000 mcg, Daily  Vitamin D, 1,000 Units, Daily  [Held by provider] clopidogrel, 75 mg, Daily  atorvastatin, 80 mg, Nightly  aspirin, 81 mg, Daily  melatonin, 6 mg, Nightly  multivitamin, 1 tablet, Daily      FLUIDS/DRIPS:     sodium chloride 5 mL/hr at 10/05/22 6626     PRNs: polyethylene glycol, 17 g, Daily PRN  sodium chloride flush, 5-40 mL, PRN  sodium chloride, , PRN  ondansetron, 4 mg, Q8H PRN   Or  ondansetron, 4 mg, Q6H PRN  acetaminophen, 650 mg, Q6H PRN   Or  acetaminophen, 650 mg, Q6H PRN  potassium chloride, 40 mEq, PRN   Or  potassium alternative oral replacement, 40 mEq, PRN   Or  potassium chloride, 10 mEq, PRN      ALLERGIES:  No Known Allergies      PHYSICAL EXAM   VITALS:  BP (!) 151/102   Pulse 92   Temp 98 °F (36.7 °C) (Axillary)   Resp 14   Ht 5' (1.524 m)   Wt 143 lb 11.8 oz (65.2 kg)   SpO2 100%   BMI 28.07 kg/m²   TEMPERATURE:  Current - Temp: 98 °F (36.7 °C);  Max - Temp  Av.3 °F (36.8 °C)  Min: 98 °F (36.7 °C)  Max: 98.6 °F (37 °C)    Physical Exam:  General appearance: alert, does not follow commands, +NGT  Eyes: Anicteric  Head: Normocephalic, without obvious abnormality  Lungs: clear to auscultation bilaterally, Normal Effort  Heart: regular rate and rhythm, normal S1 and S2, no murmurs or rubs  Abdomen: soft, non-distended, non-tender. Bowel sounds normal. No masses,  no organomegaly. Extremities: atraumatic, no cyanosis or edema  Skin: warm and dry, no jaundice  Neuro: does not follow commands    LABS AND IMAGING   Laboratory   Recent Labs     10/10/22  1320 10/11/22  0518 10/12/22  0531   WBC 8.9 9.1 7.1   HGB 11.2* 10.3* 10.0*   HCT 35.3* 31.0* 30.8*   MCV 74.3* 73.4* 73.9*    415 423     Recent Labs     10/10/22  0604 10/10/22  1320 10/11/22  0518 10/12/22  0531   NA  --  143 140 139   K  --  4.7 4.0 4.3   CL  --  108 105 104   CO2  --  25 25 25   PHOS 2.8  --  3.2 3.5   BUN  --  7 7 7   CREATININE  --  <0.5* <0.5* <0.5*     Recent Labs     10/10/22  1320 10/11/22  0518 10/12/22  0531   AST 26 25 21   ALT 10 9* 10   BILITOT <0.2 <0.2 <0.2   ALKPHOS 65 63 56     No results for input(s): LIPASE, AMYLASE in the last 72 hours. No results for input(s): PROTIME, INR in the last 72 hours. Imaging  CT HEAD WO CONTRAST   Final Result   Subacute infarction in the parasagittal left frontal lobe. Old lacunar infarcts in the central thanh, bilateral basal ganglia and   bilateral thalami. Mild parenchymal volume loss. Moderate chronic microvascular disease. XR CHEST PORTABLE   Final Result   Status post feeding tube placement which is looped in the distal stomach with   the tip in the fundus. Recommend readjusting the tip into the distal stomach   for more physiologic positioning. Stable mild cardiomegaly with resolving central pulmonary congestion and   slowly resolving left basilar opacity. XR CHEST PORTABLE   Final Result   Nasogastric tube in good position with a non-specific gas pattern. XR CHEST PORTABLE   Final Result   Enteric tube in appropriate position. CTA HEAD NECK W CONTRAST   Final Result   1.  Occlusion of the proximal left A2 segment. 2. Mid basilar trunk aneurysm, ventrally directed with the base measuring 4   mm in width, 2.5 mm base to apex measurement. 3. Tortuous appearance of the bilateral distal cervical ICAs. 4. Mild-to-moderate luminal irregularity involving the PCAs. 5. Correlate with concerns for underlying arteriopathy/vasculopathy. CT HEAD WO CONTRAST   Final Result   1. Subacute infarct in the left frontal lobe in the distribution of the MARCELO   characterized on recent CT and MRI. 2. On the current exam, there is no pattern of significant edema or mass   effect relating to this infarct. No new area of edema is appreciated. 3. No evidence of hemorrhagic transformation. Findings were discussed with Nando Graham RN, at 12:41 pm on 10/6/2022. MRI BRAIN WO CONTRAST   Final Result   Left MARCELO distribution infarct without hemorrhagic transformation. Mild   corresponding T2 FLAIR hyperintensity is identified. Mild to moderate chronic microvascular disease and involutional changes. Scattered areas of chronic lacunar type infarcts are identified both basal   ganglia regions and thalamic regions and left greater than right side of the   thanh. The findings were sent to the Radiology Results [id number] at 5:43   pm on 10/3/2022 to be communicated to a licensed caregiver. MRA HEAD WO CONTRAST   Final Result   No definite large vessel occlusion to account for the left MARCELO infarct on the   MRI. MRA NECK WO CONTRAST   Final Result   Negative for hemodynamic stenosis. XR ABDOMEN (KUB) (SINGLE AP VIEW)   Final Result   No radiopaque foreign body is evident. CT HEAD WO CONTRAST   Final Result   1. No acute intracranial bleed. 2. Multifocal age indeterminate lacunar strokes involve right centrum   semiovale, bilateral basal ganglia and left caudate lobe.    3.  White matter hypoattenuation described is typical of microvascular   ischemic disease or as sequela of dysmyelinating/demyelinating processes. 4.  Vascular calcifications are noted reflecting calcific atherosclerosis. CT CHEST PULMONARY EMBOLISM W CONTRAST   Final Result   No evidence of pulmonary embolism or acute pulmonary abnormality. Mild left   lower lobe atelectatic changes. XR CHEST PORTABLE   Final Result   No radiographic evidence of acute cardiopulmonary disease. Endoscopy      ASSESSMENT AND RECOMMENDATIONS   Warden Capps is a 62 y.o. female with PMH of of CVA, developmental delay, seizures, hypertension, iron deficiency anemia who presented on 9/29/2022 from nursing home with altered mental status. MRI brain showed left Lf CVA, likely embolic. We have been consulted for PEG tube placement    Oropharyngeal dysphagia 2/2 encephalopathy presumable from acute stroke. She has been receiving TFing via NGT and tolerating. Spoke with pt's Danna Delaney via telephone. She is agreeable to PEG tube. Embolic left CVA. Plavix held for PEG placement. Last dose 10/7  Developmental Delay  Hx of Seizures. EEG 10/5 neg for seizures      RECOMMENDATIONS:    Will discuss case with Dr. Sen Nine  Possible PEG placement tomorrow. Continue to hold plavix  Hold TFing at [place]    If you have any questions or need any further information, please feel free to contact us [phone]. Thank you for allowing us to participate in the care of Yoselyn Neville. The note was completed using Dragon voice recognition transcription. Every effort was made to ensure accuracy; however, inadvertent transcription errors may be present despite my best efforts to edit errors. Cassidy ACOSTA    Attending physician addendum:    I have personally seen and examined the patient, reviewed the patient's medical record and pertinent labs and clinical imaging. I have personally staffed the case with Cassidy ACOSTA.   I agree with her consultation note, exam findings, assessment and plans  as written above. I have made appropriate modifications and edited her assessment and plan where needed to reflect my impression and plans for this patient. Yajaira Casiano is a 62 y.o. female with PMH of of CVA, developmental delay, seizures, hypertension, iron deficiency anemia who presented on 9/29/2022 from nursing home with altered mental status. MRI brain showed left Lf CVA, likely embolic. We have been consulted for PEG tube placement. Family agreeable. Plan to attempt PEG tomorrow. Plavix has been held. NPO midnight. Thank you for allowing me to participate in this patient's care. If there are any questions or concerns regarding this patient, or the plan we have set in place, please feel free to contact me at [phone].      Kala Lerma MD

## 2022-10-12 NOTE — ADT AUTH CERT
Subscriber Details  Hospital Account [de-identified]  CVG Subscriber Name/Sex/Relation Subscriber  Subscriber Address/Phone Subscriber Emp/Emp Phone   Tiera Marie   [phone] Jaziel Miles - Female   (Self) 1965 [facility],[address]   [place] [place], [address]   [phone](V) UNEMPLOYED      Utilization Reviews       Neurology [address] [place] [address] Day 12 (10/11/2022) by Freddy Gonsalez RN       Review Status Review Entered   Completed 10/12/2022 1329       Created By   Freddy Gonsalez RN      Criteria Review      Care Day: 12 Care Date: 10/11/2022 Level of Care: Telemetry    Guideline Day 3    Level Of Care    ( ) * Activity level acceptable    10/12/2022 1:29 PM EDT by Lilia Ribeiro      Patient remains unable ot be awakened    ( ) Floor to discharge    10/12/2022 1:29 PM EDT by Key Lance      telemetry    Clinical Status    ( ) * Mental status at baseline or stable and appropriate for next level of care    10/12/2022 1:29 PM EDT by Key Lance      Mental status remains poor. ( ) * Neurologic deficits absent or stable and appropriate for next level of care    10/12/2022 1:29 PM EDT by Key Lance      she is not following any commands at this time.     ( ) * Motor deficits absent or acceptable for next level of care    10/12/2022 1:29 PM EDT by Yaneth Ribeiro      Strength: challenging portion of exam given the patient's inability to interact.    (X) * Seizures absent or managed    10/12/2022 1:29 PM EDT by Lilia Ribeiro      absent    (X) * No infection, or status acceptable    10/12/2022 1:29 PM EDT by Lilia Ribeiro      no infection    ( ) * Isolation not needed, or status acceptable    10/12/2022 1:29 PM EDT by Lilia Ribeiro      not indicated    ( ) * Respiratory status acceptable    10/12/2022 1:29 PM EDT by Lilia Ribeiro      R 19 20    (X) * Pain and nausea absent or adequately managed    10/12/2022 1:29 PM EDT by Key Dykes      denies n/v, pain    ( ) * General nonverbal only opening eyes to sternal rub and not able to maintain eye contact with RN, patient continues to not follow commands. On Telemetry monitoring    On NIHSS/Neuro Checks    On Keppra gtt         Vitals:   T 99.3 (37.4)    R 19 20     P 97 100    /102  151/110  157/101   SPO2  98 (Room air)     lb 2.4 oz (67.2 kg)         Abnl/Pertinent Labs/Radiology/Diagnostic Studies:   Creatinine: <0.5 (L)   POC Glucose: 103 (H) 100 (H) 108 (H)110 (H) 106 (H)   Albumin: 2.6 (L)   Albumin/Globulin Ratio: 0.7 (L)   ALT: 9 (L)   Total Protein: 6.2 (L)   Hemoglobin Quant: 10.3 (L)   Hematocrit: 31.0 (L)   MCV: 73.4 (L)   MCH: 24.3 (L)      CT HEAD WO CONTRAST    Impression:      Subacute infarction in the parasagittal left frontal lobe. Old lacunar infarcts in the central thanh, bilateral basal ganglia and    bilateral thalami. Mild parenchymal volume loss. Moderate chronic microvascular disease. Physical Exam:   Respiratory:  diminished b/l   Cardiovascular: Regular rate and rhythm with normal S1/S2    Abdomen: Soft, non-tender, non-distended with normal bowel sounds. Musculoskeletal: No clubbing, cyanosis or edema bilaterally. Full range of motion without deformity. Skin: Skin color, texture, turgor normal.  No rashes or lesions. Neurologic:  unable to exam, pt not responsive   Psychiatric: Lethargic. Neurologic   Mental status:    orientation non verbal.                 Attention poor               Language non verbal.                 Comprehension she is not following any commands at this time. Cranial nerves:    CN2: corneal reflexes are present. CN 3,4,6: no forced gaze deviation today. Pupils are equal round reactive bilaterally. CN7: face symmetric    CN8: hearing fortino. CN12: fortino. Strength: challenging portion of exam given the patient's inability to interact.    Sensory: she grimaces to trapezius pressure bilaterally and pain stimulus in all 4 limbs though L>R. Cerebellar/coordination: finger nose finger fortino. Tone: decreased RUE/RLE. MD Consults/ Assessments & Plans:   Internal Medicine   A/P:   Acute metabolic encephalopathy - unclear etiology, didn't improve significantly with electrolyte correction. Repeat U/A showed pyuria.   -continue D5W   -nephrology consulted, recs appreciated   -BMP q6h   -neurology consulted, recs appreciated   -check procalcitonin   -check molecular PCR panel including COVID   -MRI Brain without contrast with left MARCELO stroke   -MRA Head and Neck with contrast reviewed   -EEG reviewed, abnormal - cont keppra   -TSH WNL   - discussed with aunt, wants to have PEG tube placed, GI consulted   - hold plavix in anticipation of need for PEG, discussed with aunt the risks of further stroke given holding plavix, she agreed to hold plavix to consider PEG tube   - NG tube placed, ok to place restraints if pt pulling out NG, started TF       Acute CVA - MRI reviewed, neurology following. On ASA/plavix/statin.     Ordered MARLO and unable to be done 10/7 (friday) since aunt unable to reached on phone for consent, plan MARLO tomorrow   consulted hem/onc for suspected hypercoag work-up per neuro recs - no further work-up per hem/onc       Pyuria - UTI ruled out after further study   -stop ceftriaxone   -urine culture showed mixed jovon only       Hypernatremia - suspect due to severe dehydration, resolved   -off IVF   -nephrology consulted, recs appreciated   -BMP q6h       Hypokalemia   -replace PRN   -trend and monitor   -nephrology following       GARRETT - resolved   - likely prerenal due to severe dehydration, improved   -continue IVF   -nephrology managing fluids   -avoid nephrotoxic medications   -hold home ACEi   -trend BMP   -checked urine studies       Hx CVA   -continue home meds including DAPT and high intensity statin       Hx seizure disorder   -change to IV Keppra and continue   - EEG reviewed       Developmental delay Sickle cell disease          Neurology    Impression/Recommendations   Embolic appearing stroke (L MARCELO, L cerebellar). She did just have a recent stroke in June (L subcortical, L thanh). A MARLO was recommended a week ago. This is still not complete. Her RN says tells me today that this is now going to be done tomorrow. IF THIS IS NOT GOING TO GET DONE AND KEEPS GETTING PUSHED BACK THEN SHE NEEDS A COMPLETE TTE. Continue on telemetry to monitor for atrial fibrillation/flutter. Continue antiplatelets/statin. She does have a hx of seizures. EEGs have been w/out any recorded seizures. She is on  mg BID. She remains encephalopathic, superimposed on baseline developmental delay. We will repeat a CT head w/o. We will continue to follow. Medications:   aspirin chewable tablet 81 mg   Freq: DAILY Route: PO      atorvastatin (LIPITOR) tablet 80 mg   Freq: NIGHTLY Route: PO      carvedilol (COREG) tablet 6.25 mg   Freq: 2 TIMES DAILY WITH MEALS Route: PO      enoxaparin Sodium (LOVENOX) injection 30 mg   Freq: DAILY Route: SC      levETIRAcetam (KEPPRA) 750 mg in sodium chloride 0.9 % 100 mL IVPB   Freq: EVERY 12 HOURS Route: IV      mirtazapine (REMERON) tablet 7.5 mg   Freq: DAILY Route: PO      multivitamin 1 tablet   Freq: DAILY Route: PO      thiamine mononitrate tablet 100 mg   Freq: DAILY Route: PO      verapamil (CALAN) tablet 120 mg   Freq: Nightly Route: PO      vitamin B-12 (CYANOCOBALAMIN) tablet 1,000 mcg   Freq: DAILY Route: PO      vitamin D (CHOLECALCIFEROL) tablet 1,000 Units   Freq: DAILY Route: PO            Orders:   EKG Rhythm Strip   Up as tolerated    ADULT TUBE FEEDING; Nasogastric; Standard with Fiber; Continuous; 25; Yes; 10; Q 6 hours; 45; 100; Q 4 hours    Diet NPO             PT/OT/SLP/CM Assessments or Notes   SLP:   Dysphagia tx attempted. Patient unable to be awakened even with use of sternal rub.  ST to re-attempt as schedule permits unless otherwise notified. Dysphagia tx re-attempted. Patient remains unable ot be awakened even with use of sternal rub. ST to re-attempt later date unless otherwise notified. Concern for persistent reduced level of alertness from prior visits last week and potentially additionally declined status. Concern for limited skilled ST benefit if status remains unchanged. --------------------------------------------------------------------------------              Neurology [address] Day 7 (10/6/2022) by Sarath Higgins RN       Review Status Review Entered   Completed 10/7/2022 0802       Created By   Sarath Higgins RN      Criteria Review      Care Day: 7 Care Date: 10/6/2022 Level of Care: Telemetry    Guideline Day 3    Level Of Care    ( ) * Activity level acceptable    Clinical Status    ( ) * Mental status at baseline or stable and appropriate for next level of care    ( ) * Neurologic deficits absent or stable and appropriate for next level of care    ( ) * Motor deficits absent or acceptable for next level of care    ( ) * Seizures absent or managed    ( ) * No infection, or status acceptable    ( ) * Isolation not needed, or status acceptable    ( ) * Respiratory status acceptable    ( ) * Pain and nausea absent or adequately managed    ( ) * General Discharge Criteria met    Interventions    ( ) * Intake acceptable    ( ) * No inpatient interventions needed    * Milestone   Additional Notes   DATE:10/06/2022      VITALS:99.1-766-/89-96% on RA         ABNL/PERTINENT LABS:wbc 5.0   Hgb 10.7, hct 33.7   Bun 3, creat < 0.5   Mag 1.70   Na 140   K+3.5   Alb 2.9         RADIOLOGY/DIAGNOSTIC STUDIES:CXR-Nasogastric tube in good position with a non-specific gas pattern      CT head-Subacute infarct in the left frontal lobe in the distribution of the MARCELO characterized on recent CT and MRI. 2. On the current exam, there is no pattern of significant edema or mass effect relating to this infarct. No new area of edema is appreciated. 3. No evidence of hemorrhagic transformation. CTA neck-Occlusion of the proximal left A2 segment. 2. Mid basilar trunk aneurysm, ventrally directed with the base measuring 4 mm in width, 2.5 mm base to apex measurement. 3. Tortuous appearance of the bilateral distal cervical ICAs. 4. Mild-to-moderate luminal irregularity involving the PCAs. 5. Correlate with concerns for underlying arteriopathy/vasculopathy         PHYSICAL EXAM:General appearance: Lethargic, chronically ill appearing   Respiratory:  Normal respiratory effort. Clear to auscultation, bilaterally without Rales/Wheezes/Rhonchi. Cardiovascular: Regular rate and rhythm with normal S1/S2 without murmurs, rubs or gallops. Abdomen: Soft, non-tender, non-distended with normal bowel sounds. Per Internal Medicine-Acute CVA - MRI reviewed, neurology following. On ASA/plavix/statin   Neuro checks q4   continue IVF         Per Neurology-Impression/Recommendations   Embolic appearing stroke (L MARCELO, L cerebellar). She did just have a recent stroke in June (L subcortical, L thanh). Baseline developmental delay. Her neurologic exam remains poor. She is not following commands and has R gaze preference. She does have a hx of seizures. Repeat EEG did not record any seizures. She is on  mg BID. If her mental status remains this poor, consider repeat MRI brain and/or transfer for cvEEG      MEDICATIONS:Lovenox 30 mg SC daily   Plavix 75 mg daily, Asa 81 mg daily   IV Keppra 750 mg q12   IV Mag 2g x1, IV KCL 10 meq x3   IVF . 45 @ 50 ml/hr

## 2022-10-12 NOTE — PROGRESS NOTES
Hospitalist Progress Note      PCP: Vinod Smith MD    Date of Admission: 9/29/2022    Subjective: no acute events overnight, had MARLO today with no cardiac thrombus    Medications:  Reviewed    Infusion Medications    sodium chloride 5 mL/hr at 10/05/22 2256     Scheduled Medications    levETIRAcetam  750 mg Oral BID    carvedilol  6.25 mg Oral BID WC    sodium chloride flush  5-40 mL IntraVENous 2 times per day    enoxaparin  30 mg SubCUTAneous Daily    mirtazapine  7.5 mg Oral Daily    verapamil  120 mg Oral Nightly    vitamin B-1  100 mg Oral Daily    vitamin B-12  1,000 mcg Oral Daily    Vitamin D  1,000 Units Oral Daily    [Held by provider] clopidogrel  75 mg Oral Daily    atorvastatin  80 mg Oral Nightly    aspirin  81 mg Oral Daily    melatonin  6 mg Oral Nightly    multivitamin  1 tablet Oral Daily     PRN Meds: polyethylene glycol, sodium chloride flush, sodium chloride, ondansetron **OR** ondansetron, acetaminophen **OR** acetaminophen, potassium chloride **OR** potassium alternative oral replacement **OR** potassium chloride      Intake/Output Summary (Last 24 hours) at 10/12/2022 1351  Last data filed at 10/12/2022 1303  Gross per 24 hour   Intake 1460 ml   Output 900 ml   Net 560 ml       Physical Exam Performed:    BP (!) 151/102   Pulse 92   Temp 98 °F (36.7 °C) (Axillary)   Resp 14   Ht 5' (1.524 m)   Wt 143 lb 11.8 oz (65.2 kg)   SpO2 100%   BMI 28.07 kg/m²     General appearance: Lethargic, chronically ill appearing  HEENT: Pupils equal, round, and reactive to light. Conjunctivae/corneas clear. Neck: Supple, with full range of motion. No jugular venous distention. Trachea midline. Respiratory:  diminished b/l  Cardiovascular: Regular rate and rhythm with normal S1/S2   Abdomen: Soft, non-tender, non-distended with normal bowel sounds. Musculoskeletal: No clubbing, cyanosis or edema bilaterally. Full range of motion without deformity.   Skin: Skin color, texture, turgor normal.  No rashes or lesions. Neurologic:  unable to exam, pt not responsive  Psychiatric: Lethargic. Capillary Refill: Brisk,< 3 seconds   Peripheral Pulses: +2 palpable, equal bilaterally    Labs:   Recent Labs     10/10/22  1320 10/11/22  0518 10/12/22  0531   WBC 8.9 9.1 7.1   HGB 11.2* 10.3* 10.0*   HCT 35.3* 31.0* 30.8*    415 423     Recent Labs     10/10/22  0604 10/10/22  1320 10/11/22  0518 10/12/22  0531   NA  --  143 140 139   K  --  4.7 4.0 4.3   CL  --  108 105 104   CO2  --  25 25 25   BUN  --  7 7 7   CREATININE  --  <0.5* <0.5* <0.5*   CALCIUM  --  8.8 8.8 8.8   PHOS 2.8  --  3.2 3.5     Recent Labs     10/10/22  1320 10/11/22  0518 10/12/22  0531   AST 26 25 21   ALT 10 9* 10   BILITOT <0.2 <0.2 <0.2   ALKPHOS 65 63 56     No results for input(s): INR in the last 72 hours. No results for input(s): Sarah Shakiraels in the last 72 hours. Urinalysis:      Lab Results   Component Value Date/Time    NITRU Negative 10/01/2022 04:05 AM    WBCUA 15 10/01/2022 04:05 AM    BACTERIA 1+ 10/01/2022 04:05 AM    RBCUA 1 10/01/2022 04:05 AM    BLOODU Negative 10/01/2022 04:05 AM    SPECGRAV 1.037 10/01/2022 04:05 AM    GLUCOSEU Negative 10/01/2022 04:05 AM       Radiology:  CT HEAD WO CONTRAST   Final Result   Subacute infarction in the parasagittal left frontal lobe. Old lacunar infarcts in the central thanh, bilateral basal ganglia and   bilateral thalami. Mild parenchymal volume loss. Moderate chronic microvascular disease. XR CHEST PORTABLE   Final Result   Status post feeding tube placement which is looped in the distal stomach with   the tip in the fundus. Recommend readjusting the tip into the distal stomach   for more physiologic positioning. Stable mild cardiomegaly with resolving central pulmonary congestion and   slowly resolving left basilar opacity. XR CHEST PORTABLE   Final Result   Nasogastric tube in good position with a non-specific gas pattern. XR CHEST PORTABLE   Final Result   Enteric tube in appropriate position. CTA HEAD NECK W CONTRAST   Final Result   1. Occlusion of the proximal left A2 segment. 2. Mid basilar trunk aneurysm, ventrally directed with the base measuring 4   mm in width, 2.5 mm base to apex measurement. 3. Tortuous appearance of the bilateral distal cervical ICAs. 4. Mild-to-moderate luminal irregularity involving the PCAs. 5. Correlate with concerns for underlying arteriopathy/vasculopathy. CT HEAD WO CONTRAST   Final Result   1. Subacute infarct in the left frontal lobe in the distribution of the MARCELO   characterized on recent CT and MRI. 2. On the current exam, there is no pattern of significant edema or mass   effect relating to this infarct. No new area of edema is appreciated. 3. No evidence of hemorrhagic transformation. Findings were discussed with Jairo Dowling RN, at 12:41 pm on 10/6/2022. MRI BRAIN WO CONTRAST   Final Result   Left MARCELO distribution infarct without hemorrhagic transformation. Mild   corresponding T2 FLAIR hyperintensity is identified. Mild to moderate chronic microvascular disease and involutional changes. Scattered areas of chronic lacunar type infarcts are identified both basal   ganglia regions and thalamic regions and left greater than right side of the   thanh. The findings were sent to the Radiology Results [id number] at 5:43   pm on 10/3/2022 to be communicated to a licensed caregiver. MRA HEAD WO CONTRAST   Final Result   No definite large vessel occlusion to account for the left MARCELO infarct on the   MRI. MRA NECK WO CONTRAST   Final Result   Negative for hemodynamic stenosis. XR ABDOMEN (KUB) (SINGLE AP VIEW)   Final Result   No radiopaque foreign body is evident. CT HEAD WO CONTRAST   Final Result   1. No acute intracranial bleed.    2. Multifocal age indeterminate lacunar strokes involve right centrum   semiovale, bilateral basal ganglia and left caudate lobe. 3.  White matter hypoattenuation described is typical of microvascular   ischemic disease or as sequela of dysmyelinating/demyelinating processes. 4.  Vascular calcifications are noted reflecting calcific atherosclerosis. CT CHEST PULMONARY EMBOLISM W CONTRAST   Final Result   No evidence of pulmonary embolism or acute pulmonary abnormality. Mild left   lower lobe atelectatic changes. XR CHEST PORTABLE   Final Result   No radiographic evidence of acute cardiopulmonary disease. Assessment/Plan:    Active Hospital Problems    Diagnosis     Severe malnutrition ([address] [E43]      Priority: Medium    Altered mental status [R41.82]      Priority: Medium    Hypernatremia [E87.0]      Priority: Medium    Hypokalemia [E87.6]      Priority: Medium    GARRETT (acute kidney injury) ([facility] [address] [N17.9]      Priority: Medium          Acute metabolic encephalopathy - unclear etiology, didn't improve significantly with electrolyte correction. Repeat U/A showed pyuria.  -continue D5W  -nephrology consulted, recs appreciated  -BMP q6h  -neurology consulted, recs appreciated  -check procalcitonin  -check molecular PCR panel including COVID  -MRI Brain without contrast with left MARCELO stroke  -MRA Head and Neck with contrast reviewed  -EEG reviewed, abnormal - cont keppra  -TSH WNL  - discussed with aunt, wants to have PEG tube placed, GI consulted  - hold plavix in anticipation of need for PEG, discussed with aunt the risks of further stroke given holding plavix, she agreed to hold plavix to consider PEG tube  - NG tube placed, ok to place restraints if pt pulling out NG, started TF     Acute CVA - MRI reviewed, neurology following. On ASA/plavix/statin.    Ordered MARLO and unable to be done 10/7 (friday) since aunt unable to reached on phone for consent, s/p MARLO on 10/12/22 with no cardiac thrombus  consulted hem/onc for suspected hypercoag work-up per neuro recs - no further work-up per hem/onc  Holding plavix for potential PEG  Repeat CT head 10/11 with no new changes     Pyuria - UTI ruled out after further study  -stop ceftriaxone  -urine culture showed mixed jovon only     Hypernatremia - suspect due to severe dehydration, resolved  -off IVF  -nephrology consulted, recs appreciated  -BMP q6h     Hypokalemia  -replace PRN  -trend and monitor  -nephrology following     GARRETT - resolved  - likely prerenal due to severe dehydration, improved  -continue IVF  -nephrology managing fluids  -avoid nephrotoxic medications  -hold home ACEi  -trend BMP     Hx CVA  -continue home meds including DAPT and high intensity statin     Hx seizure disorder  -change to IV Keppra and continue  - EEG reviewed     Developmental delay  Sickle cell disease           DVT Prophylaxis: Lovenox  Diet: Diet NPO  ADULT TUBE FEEDING; Nasogastric; Standard with Fiber; Continuous; 25; Yes; 10; Q 6 hours; 45; 100; Q 4 hours  Code Status: Full Code  PT/OT Eval Status: ordered    Dispo - cont care. Discussed with GI that plavix has been on hold, awaiting recs for PEG tube. Discussed with palliative care to discuss goals of care.  Poor prognosis      Nadiya Owens MD

## 2022-10-12 NOTE — PROGRESS NOTES
[facility]   Speech Therapy  Daily Dysphagia Treatment Note  DISCHARGE NOTE    Yoselyn Neville  AGE: 62 y.o. GENDER: female  : 1965  [id number]  EPISODE DATE:  2022    Patient Active Problem List   Diagnosis    Altered mental status    Hypernatremia    Hypokalemia    GARRETT (acute kidney injury) ([facility]    Severe malnutrition ([facility]     No Known Allergies    Treatment Diagnosis: Dysphagia     CHART REVIEW:  2022 admitted with c/o AMS  MD ADMISSION H&P HPI:  Sam Selby is 62 y.o. female with history of developmental delay, seizure disorder, stroke, sickle cell disease. Per report, at baseline patient is verbally interactive but since yesterday patient has not been talking. She is brought to the ED from her nursing home for altered mental status. On interview, she does not say a word although she is awake. She is unable to answer any questions to me. She can make eye contact and withdraws from. Work-up reveals severe hypernatremia sodium 159, hypokalemia potassium 2.8 and GARRETT which suggest severe dehydration     NPO ordered 10/1/2022 d/t AMS     10/4/2022 Neuro notes concern for new embolic CVA (L MARCELO, L cerebellar) with additional note for recent stroke in  (L subcortical, L thanh)     IMAGING:  CXR: 2022  Impression   No radiographic evidence of acute cardiopulmonary disease. CT CHEST: 2022  Impression   No evidence of pulmonary embolism or acute pulmonary abnormality. Mild left   lower lobe atelectatic changes. BRAIN MRI: 10/2/2022  Impression   Left MARCELO distribution infarct without hemorrhagic transformation. Mild   corresponding T2 FLAIR hyperintensity is identified. Mild to moderate chronic microvascular disease and involutional changes. Scattered areas of chronic lacunar type infarcts are identified both basal   ganglia regions and thalamic regions and left greater than right side of the   thanh.          [place] soft/thin at [facility] prior to admission  7/7/2022  Health SLP notes: Assessment: Patient presents with increased risk of oropharyngeal dysphagia secondary to cognitive status and comorbidities. No overt s/s aspiration observed with thin liquids or hard solids. Pt with prolonged and impaired mastication of hard solids, requiring multiple liquid washes. Based on today's assessment, recommend dysphagia 2 diet with thin liquids, meds whole or crushed with puree when awake/alert. Will follow. Subjective:     Current diet: NG tube feeding    Comments regarding tolerating Current Diet:   Persistent poor level of responsiveness; no improvement per nsg. Objective:     Pain: unable to awaken    Cognitive/Behavior   Behavior/Cognition: unable to awaken with verbal cues, pt with minimal eye opening 1 with verbal stimulus and sternal rub. Positioning: semi reclined    PO Trials:  No trials this date due to pt unable to be awakened. Dysphagia Tx:   Direct Dysphagia tx: unable this date  Dysphagia ex: N/A  Training in compensatory strategies: NA  Pt response to ex/training: N/A    Goals: The patient will tolerate ongoing dysphagia evaluation (clinical vs instrumental) with additional goals to be determined as appropriate. Not met. Assessment:   Impressions:   Pt unable to be awakened this date with verbal and tactile stimulation. Pt now with NG tube  PEG tube planned. ST will discharge pt at this time due to no improvement in status that could indicate potential for PO; pt unable to participate in therapy since 10/7/2022 due to pt unable to be awakened. Pt last alertness for and acceptance of PO  with ST is 10/7/2022  at which time intake was not functional for adequate or safe po. PO was limited in amount and intake was slow due to lengthy oral hold followed by a decrease in skills as trials progressed. Continue NPO.   Please reorder ST if any change in status that may indicate pt could tolerate PO.    Diet Recommendations:  NPO with NG (PEG pending per GI)  Recommended Form of Meds:  (via alterante means (IV/rectal))    Strategies:    Oral care    Education:  Consulted and agree with results and recommendations:  RN  Patient Education: completed on resutls/recs/plan  Patient Education Response: No evidence of learning    Prognosis:   Poor for PO     Plan:     Continue Dysphagia Therapy: D/C at this time.       Interventions: Therapeutic Interventions: Therapeutic PO trials with SLP, Patient/Family education, Oral care  Duration/Frequency of therapy while on unit: Duration/Frequency of Treatment  Duration of Treatment: ST to tx 3-5 times per week during acute admission    Discharge Instructions:   Anticipate NO NEED for further skilled Speech Therapy for Dysphagia at discharge pending goals of care    Coded treatment time: 0  Total treatment time: 5 (no charge)    Electronically signed by Bernie Sanchez MS CCC/SLP [id number]  Speech Language Pathologist   on 10/12/2022 at 3:10 PM

## 2022-10-12 NOTE — PROGRESS NOTES
Tube feeds restarted after patient returned from [facility]. Tolerating goal rate without difficulty. Patient sleeping most of the day. Eyes occasionally open but remains responsive only to painful stimulus. Verbal consent for PEG placement obtained from patient's aunt, Agustina Overton. Witnessed and verified by 2nd RN.

## 2022-10-12 NOTE — PROGRESS NOTES
Occupational Therapy  Therapy attempt. Pt in bed, eyes closed. Pt slightly opening eyes to sound of name 1x, unable to keep open with further tactile/verbal stimulation to participate in therapy. Will follow as schedule permits. Refer to the last note for status (10-7-22) if pt is discharged.   Kristin MICHAUD/JOHNNY,515

## 2022-10-12 NOTE — DISCHARGE INSTRUCTIONS
TRANSESOPHAGEAL ECHOCARDIOGRAM ([facility])      No driving for 24 hours after procedure  Do not make important / legal decisions within 24 hours after procedure  Do not drink any alcoholic beverages or sleeping pills for 24 hours   Advance your diet as tolerated  Continue all of your medication as directed  Call your doctor for the following symptoms:     -Fever   -Difficulty swallowing   -Chest pain   -Difficulty breathing   -Coughing up blood or bloody sputum

## 2022-10-12 NOTE — CARE COORDINATION
[facility]  Palliative Care   Progress Note    NAME:  Warden Capps  MEDICAL RECORD NUMBER:  [id number]  AGE: 62 y.o. GENDER: female  : 1965  TODAY'S DATE:  10/12/2022    Subjective: Patient  does not open eyes when name called, min response to pain. Objective:    Vitals:    10/12/22 1741   BP: 133/87   Pulse: 92   Resp:    Temp:    SpO2:      Lab Results   Component Value Date    WBC 7.1 10/12/2022    HGB 10.0 (L) 10/12/2022    HCT 30.8 (L) 10/12/2022    MCV 73.9 (L) 10/12/2022     10/12/2022     Lab Results   Component Value Date    CREATININE <0.5 (L) 10/12/2022    BUN 7 10/12/2022     10/12/2022    K 4.3 10/12/2022     10/12/2022    CO2 25 10/12/2022     Lab Results   Component Value Date    ALT 10 10/12/2022    AST 21 10/12/2022    ALKPHOS 56 10/12/2022    BILITOT <0.2 10/12/2022       Plan: I have called her [address] to discuss goals of care. We have discussed her previous ability and the ability of her to participate and be active in life. She feels this is more than she can handle and then told me the patient has an adult son that lives in [place]. She plans on calling him tonight and a few more family members to help her with decision making. Code Status: Full Code  Discharge Environment:  [] Hospice Consult Agency:  [] Inpatient Hospice    [] Home with  [facility]   [] ECF with Hospice  [] ECF skilled care with Hospice to follow   [] Other:    Teaching Time:  0hours  30 min     I will continue to follow Ms. Neville's care as needed. Thank you for allowing me to participate in the care of Ms. Neville .      Electronically signed by Vidal Aburto RN, BSN,CHPN on 10/12/2022 at 6:04 PM  [address] [place] [address]  Office: [phone]

## 2022-10-13 ENCOUNTER — HOSPITAL ENCOUNTER (INPATIENT)
Age: 57
LOS: 4 days | Discharge: NURSING, REHAB OR LONG-TERM CARE | DRG: 058 | End: 2022-10-17
Attending: STUDENT IN AN ORGANIZED HEALTH CARE EDUCATION/TRAINING PROGRAM | Admitting: STUDENT IN AN ORGANIZED HEALTH CARE EDUCATION/TRAINING PROGRAM
Payer: MEDICAID

## 2022-10-13 ENCOUNTER — ANESTHESIA EVENT (OUTPATIENT)
Dept: ENDOSCOPY | Age: 57
DRG: 426 | End: 2022-10-13
Payer: MEDICAID

## 2022-10-13 ENCOUNTER — ANESTHESIA (OUTPATIENT)
Dept: ENDOSCOPY | Age: 57
DRG: 426 | End: 2022-10-13
Payer: MEDICAID

## 2022-10-13 VITALS
TEMPERATURE: 98.5 F | WEIGHT: 143.96 LBS | BODY MASS INDEX: 28.26 KG/M2 | SYSTOLIC BLOOD PRESSURE: 149 MMHG | DIASTOLIC BLOOD PRESSURE: 94 MMHG | HEIGHT: 60 IN | HEART RATE: 93 BPM | RESPIRATION RATE: 16 BRPM | OXYGEN SATURATION: 95 %

## 2022-10-13 PROBLEM — R41.82 AMS (ALTERED MENTAL STATUS): Status: ACTIVE | Noted: 2022-10-13

## 2022-10-13 PROBLEM — G93.40 ACUTE ENCEPHALOPATHY: Status: ACTIVE | Noted: 2022-10-13

## 2022-10-13 LAB
GLUCOSE BLD-MCNC: 102 MG/DL (ref 70–99)
GLUCOSE BLD-MCNC: 109 MG/DL (ref 70–99)
GLUCOSE BLD-MCNC: 66 MG/DL (ref 70–99)
GLUCOSE BLD-MCNC: 72 MG/DL (ref 70–99)
GLUCOSE BLD-MCNC: 85 MG/DL (ref 70–99)
GLUCOSE BLD-MCNC: 92 MG/DL (ref 70–99)
PERFORMED ON: ABNORMAL
PERFORMED ON: NORMAL
PHOSPHORUS: 4 MG/DL (ref 2.5–4.9)

## 2022-10-13 PROCEDURE — 0DH64UZ INSERTION OF FEEDING DEVICE INTO STOMACH, PERCUTANEOUS ENDOSCOPIC APPROACH: ICD-10-PCS | Performed by: INTERNAL MEDICINE

## 2022-10-13 PROCEDURE — 88341 IMHCHEM/IMCYTCHM EA ADD ANTB: CPT

## 2022-10-13 PROCEDURE — 3609012400 HC EGD TRANSORAL BIOPSY SINGLE/MULTIPLE: Performed by: INTERNAL MEDICINE

## 2022-10-13 PROCEDURE — 6370000000 HC RX 637 (ALT 250 FOR IP): Performed by: INTERNAL MEDICINE

## 2022-10-13 PROCEDURE — 88342 IMHCHEM/IMCYTCHM 1ST ANTB: CPT

## 2022-10-13 PROCEDURE — 2709999900 HC NON-CHARGEABLE SUPPLY: Performed by: INTERNAL MEDICINE

## 2022-10-13 PROCEDURE — 3700000000 HC ANESTHESIA ATTENDED CARE: Performed by: INTERNAL MEDICINE

## 2022-10-13 PROCEDURE — 3E0G76Z INTRODUCTION OF NUTRITIONAL SUBSTANCE INTO UPPER GI, VIA NATURAL OR ARTIFICIAL OPENING: ICD-10-PCS | Performed by: INTERNAL MEDICINE

## 2022-10-13 PROCEDURE — 2580000003 HC RX 258: Performed by: INTERNAL MEDICINE

## 2022-10-13 PROCEDURE — 7100000000 HC PACU RECOVERY - FIRST 15 MIN: Performed by: INTERNAL MEDICINE

## 2022-10-13 PROCEDURE — 88305 TISSUE EXAM BY PATHOLOGIST: CPT

## 2022-10-13 PROCEDURE — 0DB98ZX EXCISION OF DUODENUM, VIA NATURAL OR ARTIFICIAL OPENING ENDOSCOPIC, DIAGNOSTIC: ICD-10-PCS | Performed by: INTERNAL MEDICINE

## 2022-10-13 PROCEDURE — 84100 ASSAY OF PHOSPHORUS: CPT

## 2022-10-13 PROCEDURE — 3609013300 HC EGD TUBE PLACEMENT: Performed by: INTERNAL MEDICINE

## 2022-10-13 PROCEDURE — 3700000001 HC ADD 15 MINUTES (ANESTHESIA): Performed by: INTERNAL MEDICINE

## 2022-10-13 PROCEDURE — 7100000001 HC PACU RECOVERY - ADDTL 15 MIN: Performed by: INTERNAL MEDICINE

## 2022-10-13 PROCEDURE — 94760 N-INVAS EAR/PLS OXIMETRY 1: CPT

## 2022-10-13 PROCEDURE — 2060000000 HC ICU INTERMEDIATE R&B

## 2022-10-13 PROCEDURE — 2500000003 HC RX 250 WO HCPCS: Performed by: NURSE ANESTHETIST, CERTIFIED REGISTERED

## 2022-10-13 PROCEDURE — 9990000010 HC NO CHARGE VISIT

## 2022-10-13 PROCEDURE — 36415 COLL VENOUS BLD VENIPUNCTURE: CPT

## 2022-10-13 PROCEDURE — 6360000002 HC RX W HCPCS: Performed by: NURSE ANESTHETIST, CERTIFIED REGISTERED

## 2022-10-13 RX ORDER — POLYETHYLENE GLYCOL 3350 17 G/17G
17 POWDER, FOR SOLUTION ORAL DAILY PRN
Status: DISCONTINUED | OUTPATIENT
Start: 2022-10-13 | End: 2022-10-17 | Stop reason: HOSPADM

## 2022-10-13 RX ORDER — VITAMIN B COMPLEX
1000 TABLET ORAL DAILY
Status: DISCONTINUED | OUTPATIENT
Start: 2022-10-14 | End: 2022-10-13 | Stop reason: HOSPADM

## 2022-10-13 RX ORDER — ONDANSETRON 4 MG/1
4 TABLET, ORALLY DISINTEGRATING ORAL EVERY 8 HOURS PRN
Status: DISCONTINUED | OUTPATIENT
Start: 2022-10-13 | End: 2022-10-17 | Stop reason: HOSPADM

## 2022-10-13 RX ORDER — CARVEDILOL 6.25 MG/1
6.25 TABLET ORAL 2 TIMES DAILY WITH MEALS
Status: DISCONTINUED | OUTPATIENT
Start: 2022-10-13 | End: 2022-10-13 | Stop reason: HOSPADM

## 2022-10-13 RX ORDER — FENTANYL CITRATE 50 UG/ML
25 INJECTION, SOLUTION INTRAMUSCULAR; INTRAVENOUS EVERY 5 MIN PRN
Status: DISCONTINUED | OUTPATIENT
Start: 2022-10-13 | End: 2022-10-13 | Stop reason: HOSPADM

## 2022-10-13 RX ORDER — VITAMIN B COMPLEX
1000 TABLET ORAL DAILY
Status: DISCONTINUED | OUTPATIENT
Start: 2022-10-14 | End: 2022-10-17 | Stop reason: HOSPADM

## 2022-10-13 RX ORDER — SODIUM CHLORIDE 0.9 % (FLUSH) 0.9 %
5-40 SYRINGE (ML) INJECTION EVERY 12 HOURS SCHEDULED
Status: DISCONTINUED | OUTPATIENT
Start: 2022-10-14 | End: 2022-10-17 | Stop reason: HOSPADM

## 2022-10-13 RX ORDER — MIRTAZAPINE 15 MG/1
7.5 TABLET, FILM COATED ORAL DAILY
Status: DISCONTINUED | OUTPATIENT
Start: 2022-10-14 | End: 2022-10-17 | Stop reason: HOSPADM

## 2022-10-13 RX ORDER — LIDOCAINE HYDROCHLORIDE 20 MG/ML
INJECTION, SOLUTION EPIDURAL; INFILTRATION; INTRACAUDAL; PERINEURAL PRN
Status: DISCONTINUED | OUTPATIENT
Start: 2022-10-13 | End: 2022-10-13 | Stop reason: SDUPTHER

## 2022-10-13 RX ORDER — SODIUM CHLORIDE 0.9 % (FLUSH) 0.9 %
5-40 SYRINGE (ML) INJECTION EVERY 12 HOURS SCHEDULED
Status: DISCONTINUED | OUTPATIENT
Start: 2022-10-13 | End: 2022-10-13 | Stop reason: HOSPADM

## 2022-10-13 RX ORDER — ATORVASTATIN CALCIUM 80 MG/1
80 TABLET, FILM COATED ORAL NIGHTLY
Status: DISCONTINUED | OUTPATIENT
Start: 2022-10-13 | End: 2022-10-13 | Stop reason: HOSPADM

## 2022-10-13 RX ORDER — VERAPAMIL HYDROCHLORIDE 120 MG/1
120 TABLET, FILM COATED ORAL NIGHTLY
Status: DISCONTINUED | OUTPATIENT
Start: 2022-10-13 | End: 2022-10-13 | Stop reason: HOSPADM

## 2022-10-13 RX ORDER — SODIUM CHLORIDE 9 MG/ML
INJECTION, SOLUTION INTRAVENOUS PRN
Status: DISCONTINUED | OUTPATIENT
Start: 2022-10-13 | End: 2022-10-17 | Stop reason: HOSPADM

## 2022-10-13 RX ORDER — LORAZEPAM 2 MG/ML
2 INJECTION INTRAMUSCULAR EVERY 6 HOURS PRN
Status: DISCONTINUED | OUTPATIENT
Start: 2022-10-13 | End: 2022-10-17 | Stop reason: HOSPADM

## 2022-10-13 RX ORDER — ENOXAPARIN SODIUM 100 MG/ML
40 INJECTION SUBCUTANEOUS DAILY
Status: DISCONTINUED | OUTPATIENT
Start: 2022-10-14 | End: 2022-10-17 | Stop reason: HOSPADM

## 2022-10-13 RX ORDER — MIRTAZAPINE 15 MG/1
7.5 TABLET, FILM COATED ORAL DAILY
Status: DISCONTINUED | OUTPATIENT
Start: 2022-10-14 | End: 2022-10-13 | Stop reason: HOSPADM

## 2022-10-13 RX ORDER — SODIUM CHLORIDE 9 MG/ML
INJECTION, SOLUTION INTRAVENOUS PRN
Status: DISCONTINUED | OUTPATIENT
Start: 2022-10-13 | End: 2022-10-13 | Stop reason: HOSPADM

## 2022-10-13 RX ORDER — ACETAMINOPHEN 650 MG/1
650 SUPPOSITORY RECTAL EVERY 6 HOURS PRN
Status: DISCONTINUED | OUTPATIENT
Start: 2022-10-13 | End: 2022-10-17 | Stop reason: HOSPADM

## 2022-10-13 RX ORDER — LABETALOL HYDROCHLORIDE 5 MG/ML
5 INJECTION, SOLUTION INTRAVENOUS
Status: DISCONTINUED | OUTPATIENT
Start: 2022-10-13 | End: 2022-10-13 | Stop reason: HOSPADM

## 2022-10-13 RX ORDER — GAUZE BANDAGE 2" X 2"
100 BANDAGE TOPICAL DAILY
Status: DISCONTINUED | OUTPATIENT
Start: 2022-10-14 | End: 2022-10-13 | Stop reason: HOSPADM

## 2022-10-13 RX ORDER — LANOLIN ALCOHOL/MO/W.PET/CERES
6 CREAM (GRAM) TOPICAL NIGHTLY
Status: DISCONTINUED | OUTPATIENT
Start: 2022-10-13 | End: 2022-10-13 | Stop reason: HOSPADM

## 2022-10-13 RX ORDER — DEXTROSE AND SODIUM CHLORIDE 5; .9 G/100ML; G/100ML
INJECTION, SOLUTION INTRAVENOUS CONTINUOUS
Status: DISCONTINUED | OUTPATIENT
Start: 2022-10-13 | End: 2022-10-14

## 2022-10-13 RX ORDER — VERAPAMIL HYDROCHLORIDE 80 MG/1
120 TABLET ORAL NIGHTLY
Status: DISCONTINUED | OUTPATIENT
Start: 2022-10-14 | End: 2022-10-17 | Stop reason: HOSPADM

## 2022-10-13 RX ORDER — OXYCODONE HYDROCHLORIDE 10 MG/1
10 TABLET ORAL PRN
Status: DISCONTINUED | OUTPATIENT
Start: 2022-10-13 | End: 2022-10-13 | Stop reason: HOSPADM

## 2022-10-13 RX ORDER — LEVETIRACETAM 100 MG/ML
750 SOLUTION ORAL 2 TIMES DAILY
Status: DISCONTINUED | OUTPATIENT
Start: 2022-10-13 | End: 2022-10-13 | Stop reason: HOSPADM

## 2022-10-13 RX ORDER — LANOLIN ALCOHOL/MO/W.PET/CERES
6 CREAM (GRAM) TOPICAL NIGHTLY
Status: DISCONTINUED | OUTPATIENT
Start: 2022-10-14 | End: 2022-10-17 | Stop reason: HOSPADM

## 2022-10-13 RX ORDER — CARVEDILOL 6.25 MG/1
6.25 TABLET ORAL 2 TIMES DAILY WITH MEALS
Status: DISCONTINUED | OUTPATIENT
Start: 2022-10-14 | End: 2022-10-17 | Stop reason: HOSPADM

## 2022-10-13 RX ORDER — CLOPIDOGREL BISULFATE 75 MG/1
75 TABLET ORAL DAILY
Status: DISCONTINUED | OUTPATIENT
Start: 2022-10-14 | End: 2022-10-14

## 2022-10-13 RX ORDER — DIPHENHYDRAMINE HYDROCHLORIDE 50 MG/ML
12.5 INJECTION INTRAMUSCULAR; INTRAVENOUS
Status: DISCONTINUED | OUTPATIENT
Start: 2022-10-13 | End: 2022-10-13 | Stop reason: HOSPADM

## 2022-10-13 RX ORDER — MULTIVITAMIN WITH IRON
1 TABLET ORAL DAILY
Status: DISCONTINUED | OUTPATIENT
Start: 2022-10-14 | End: 2022-10-13 | Stop reason: HOSPADM

## 2022-10-13 RX ORDER — MEPERIDINE HYDROCHLORIDE 25 MG/ML
12.5 INJECTION INTRAMUSCULAR; INTRAVENOUS; SUBCUTANEOUS EVERY 5 MIN PRN
Status: DISCONTINUED | OUTPATIENT
Start: 2022-10-13 | End: 2022-10-13 | Stop reason: HOSPADM

## 2022-10-13 RX ORDER — ATORVASTATIN CALCIUM 80 MG/1
80 TABLET, FILM COATED ORAL NIGHTLY
Status: DISCONTINUED | OUTPATIENT
Start: 2022-10-14 | End: 2022-10-17 | Stop reason: HOSPADM

## 2022-10-13 RX ORDER — ASPIRIN 81 MG/1
81 TABLET, CHEWABLE ORAL DAILY
Status: DISCONTINUED | OUTPATIENT
Start: 2022-10-14 | End: 2022-10-17 | Stop reason: HOSPADM

## 2022-10-13 RX ORDER — LABETALOL HYDROCHLORIDE 5 MG/ML
5 INJECTION, SOLUTION INTRAVENOUS
Status: DISCONTINUED | OUTPATIENT
Start: 2022-10-13 | End: 2022-10-17 | Stop reason: HOSPADM

## 2022-10-13 RX ORDER — LANOLIN ALCOHOL/MO/W.PET/CERES
1000 CREAM (GRAM) TOPICAL DAILY
Status: DISCONTINUED | OUTPATIENT
Start: 2022-10-14 | End: 2022-10-13 | Stop reason: HOSPADM

## 2022-10-13 RX ORDER — SODIUM CHLORIDE 0.9 % (FLUSH) 0.9 %
5-40 SYRINGE (ML) INJECTION EVERY 12 HOURS SCHEDULED
Status: DISCONTINUED | OUTPATIENT
Start: 2022-10-13 | End: 2022-10-17 | Stop reason: HOSPADM

## 2022-10-13 RX ORDER — SODIUM CHLORIDE 0.9 % (FLUSH) 0.9 %
5-40 SYRINGE (ML) INJECTION PRN
Status: DISCONTINUED | OUTPATIENT
Start: 2022-10-13 | End: 2022-10-13 | Stop reason: HOSPADM

## 2022-10-13 RX ORDER — MULTIVITAMIN WITH IRON
1 TABLET ORAL DAILY
Status: DISCONTINUED | OUTPATIENT
Start: 2022-10-14 | End: 2022-10-17 | Stop reason: HOSPADM

## 2022-10-13 RX ORDER — SODIUM CHLORIDE 0.9 % (FLUSH) 0.9 %
5-40 SYRINGE (ML) INJECTION PRN
Status: DISCONTINUED | OUTPATIENT
Start: 2022-10-13 | End: 2022-10-17 | Stop reason: HOSPADM

## 2022-10-13 RX ORDER — LEVETIRACETAM 100 MG/ML
750 SOLUTION ORAL 2 TIMES DAILY
Status: DISCONTINUED | OUTPATIENT
Start: 2022-10-14 | End: 2022-10-14

## 2022-10-13 RX ORDER — OXYCODONE HYDROCHLORIDE 5 MG/1
5 TABLET ORAL PRN
Status: DISCONTINUED | OUTPATIENT
Start: 2022-10-13 | End: 2022-10-13 | Stop reason: HOSPADM

## 2022-10-13 RX ORDER — ASPIRIN 81 MG/1
81 TABLET, CHEWABLE ORAL DAILY
Status: DISCONTINUED | OUTPATIENT
Start: 2022-10-14 | End: 2022-10-13 | Stop reason: HOSPADM

## 2022-10-13 RX ORDER — GAUZE BANDAGE 2" X 2"
100 BANDAGE TOPICAL DAILY
Status: DISCONTINUED | OUTPATIENT
Start: 2022-10-14 | End: 2022-10-17 | Stop reason: HOSPADM

## 2022-10-13 RX ORDER — CEFAZOLIN SODIUM 1 G/3ML
INJECTION, POWDER, FOR SOLUTION INTRAMUSCULAR; INTRAVENOUS PRN
Status: DISCONTINUED | OUTPATIENT
Start: 2022-10-13 | End: 2022-10-13 | Stop reason: SDUPTHER

## 2022-10-13 RX ORDER — ONDANSETRON 2 MG/ML
4 INJECTION INTRAMUSCULAR; INTRAVENOUS EVERY 6 HOURS PRN
Status: DISCONTINUED | OUTPATIENT
Start: 2022-10-13 | End: 2022-10-17 | Stop reason: HOSPADM

## 2022-10-13 RX ORDER — ONDANSETRON 2 MG/ML
4 INJECTION INTRAMUSCULAR; INTRAVENOUS
Status: DISCONTINUED | OUTPATIENT
Start: 2022-10-13 | End: 2022-10-13 | Stop reason: HOSPADM

## 2022-10-13 RX ORDER — PROPOFOL 10 MG/ML
INJECTION, EMULSION INTRAVENOUS PRN
Status: DISCONTINUED | OUTPATIENT
Start: 2022-10-13 | End: 2022-10-13 | Stop reason: SDUPTHER

## 2022-10-13 RX ORDER — LANOLIN ALCOHOL/MO/W.PET/CERES
1000 CREAM (GRAM) TOPICAL DAILY
Status: DISCONTINUED | OUTPATIENT
Start: 2022-10-14 | End: 2022-10-17 | Stop reason: HOSPADM

## 2022-10-13 RX ORDER — ACETAMINOPHEN 325 MG/1
650 TABLET ORAL EVERY 6 HOURS PRN
Status: DISCONTINUED | OUTPATIENT
Start: 2022-10-13 | End: 2022-10-17 | Stop reason: HOSPADM

## 2022-10-13 RX ORDER — DEXTROSE AND SODIUM CHLORIDE 5; .9 G/100ML; G/100ML
INJECTION, SOLUTION INTRAVENOUS CONTINUOUS
Status: DISCONTINUED | OUTPATIENT
Start: 2022-10-13 | End: 2022-10-13 | Stop reason: HOSPADM

## 2022-10-13 RX ADMIN — Medication 6 MG: at 20:07

## 2022-10-13 RX ADMIN — ATORVASTATIN CALCIUM 80 MG: 80 TABLET, FILM COATED ORAL at 20:07

## 2022-10-13 RX ADMIN — SODIUM CHLORIDE: 9 INJECTION, SOLUTION INTRAVENOUS at 13:22

## 2022-10-13 RX ADMIN — CEFAZOLIN SODIUM 2 G: 1 INJECTION, POWDER, FOR SOLUTION INTRAMUSCULAR; INTRAVENOUS at 13:12

## 2022-10-13 RX ADMIN — VERAPAMIL HYDROCHLORIDE 120 MG: 120 TABLET ORAL at 20:07

## 2022-10-13 RX ADMIN — DEXTROSE MONOHYDRATE AND SODIUM CHLORIDE: 5; .9 INJECTION, SOLUTION INTRAVENOUS at 23:00

## 2022-10-13 RX ADMIN — Medication 12 ML: at 23:20

## 2022-10-13 RX ADMIN — LEVETIRACETAM 750 MG: 100 SOLUTION ORAL at 20:08

## 2022-10-13 RX ADMIN — CARVEDILOL 6.25 MG: 6.25 TABLET, FILM COATED ORAL at 16:15

## 2022-10-13 RX ADMIN — PROPOFOL 50 MG: 10 INJECTION, EMULSION INTRAVENOUS at 13:10

## 2022-10-13 RX ADMIN — ACETAMINOPHEN 650 MG: 325 TABLET ORAL at 20:07

## 2022-10-13 RX ADMIN — SODIUM CHLORIDE, PRESERVATIVE FREE 10 ML: 5 INJECTION INTRAVENOUS at 16:09

## 2022-10-13 RX ADMIN — DEXTROSE AND SODIUM CHLORIDE: 5; 900 INJECTION, SOLUTION INTRAVENOUS at 18:11

## 2022-10-13 RX ADMIN — PROPOFOL 160 MCG/KG/MIN: 10 INJECTION, EMULSION INTRAVENOUS at 13:12

## 2022-10-13 RX ADMIN — LIDOCAINE HYDROCHLORIDE 60 MG: 20 INJECTION, SOLUTION EPIDURAL; INFILTRATION; INTRACAUDAL; PERINEURAL at 13:10

## 2022-10-13 ASSESSMENT — LIFESTYLE VARIABLES: SMOKING_STATUS: 0

## 2022-10-13 ASSESSMENT — PAIN - FUNCTIONAL ASSESSMENT: PAIN_FUNCTIONAL_ASSESSMENT: WONG-BAKER FACES

## 2022-10-13 ASSESSMENT — PAIN SCALES - WONG BAKER: WONGBAKER_NUMERICALRESPONSE: 0

## 2022-10-13 ASSESSMENT — ENCOUNTER SYMPTOMS: SHORTNESS OF BREATH: 0

## 2022-10-13 NOTE — ANESTHESIA PRE PROCEDURE
Department of Anesthesiology  Preprocedure Note       Name:  Herb De La Paz   Age:  62 y.o.  :  1965                                          MRN:  [id number]         Date:  10/13/2022      Surgeon: Cassandra Saleem):  Rupinder Quijano MD    Procedure: Procedure(s):  ESOPHAGOGASTRODUODENOSCOPY WITH  PERCUTANEOUS ENDOSCOPIC GASTROSTOMY  TUBE PLACEMENT    Medications prior to admission:   Prior to Admission medications    Medication Sig Start Date End Date Taking?  Authorizing Provider   aspirin 81 MG chewable tablet Take 81 mg by mouth daily   Yes Historical Provider, MD   atorvastatin (LIPITOR) 80 MG tablet Take 80 mg by mouth nightly   Yes Historical Provider, MD   vitamin D (CHOLECALCIFEROL) 25 MCG (1000 UT) TABS tablet Take 1,000 Units by mouth daily   Yes Historical Provider, MD   clopidogrel (PLAVIX) 75 MG tablet Take 75 mg by mouth daily   Yes Historical Provider, MD   vitamin B-12 (CYANOCOBALAMIN) 1000 MCG tablet Take 1,000 mcg by mouth daily   Yes Historical Provider, MD   ferrous sulfate (IRON 325) 325 (65 Fe) MG tablet Take 325 mg by mouth every other day   Yes Historical Provider, MD   levETIRAcetam (KEPPRA) 500 MG tablet Take 500 mg by mouth 2 times daily   Yes Historical Provider, MD   lisinopril (PRINIVIL;ZESTRIL) 40 MG tablet Take 40 mg by mouth daily   Yes Historical Provider, MD   melatonin 3 MG TABS tablet Take 6 mg by mouth at bedtime   Yes Historical Provider, MD   Multiple Vitamin (MULTIVITAMIN ADULT) TABS Take 1 tablet by mouth daily   Yes Historical Provider, MD   mirtazapine (REMERON) 7.5 MG tablet Take 7.5 mg by mouth daily For appetite   Yes Historical Provider, MD   vitamin B-1 (THIAMINE) 100 MG tablet Take 100 mg by mouth daily   Yes Historical Provider, MD   verapamil (CALAN) 120 MG tablet Take 120 mg by mouth at bedtime   Yes Historical Provider, MD       Current medications:    Current Facility-Administered Medications   Medication Dose Route Frequency Provider Last Rate Last Admin    levETIRAcetam (KEPPRA) 100 MG/ML solution 750 mg  750 mg Oral BID Ronald Rehman, APRN - CNP   750 mg at 10/12/22 2215    carvedilol (COREG) tablet 6.25 mg  6.25 mg Oral BID  Stephany Lomeli MD   6.25 mg at 10/12/22 1741    polyethylene glycol (GLYCOLAX) packet 17 g  17 g Oral Daily PRN Jaya Hahn MD        sodium chloride flush 0.9 % injection 5-40 mL  5-40 mL IntraVENous 2 times per day Musa Knapp MD   10 mL at 10/12/22 2258    sodium chloride flush 0.9 % injection 5-40 mL  5-40 mL IntraVENous PRN Musa Knapp MD        0.9 % sodium chloride infusion   IntraVENous PRN Musa Knapp MD 5 mL/hr at 10/05/22 2256 Rate Verify at 10/05/22 2256    ondansetron (ZOFRAN-ODT) disintegrating tablet 4 mg  4 mg Oral Q8H PRN Musa Knapp MD        Or    ondansetron (ZOFRAN) injection 4 mg  4 mg IntraVENous Q6H PRN Musa Knapp MD        acetaminophen (TYLENOL) tablet 650 mg  650 mg Oral Q6H PRN Musa Knapp MD   650 mg at 10/12/22 2234    Or    acetaminophen (TYLENOL) suppository 650 mg  650 mg Rectal Q6H PRN Musa Knapp MD   650 mg at 10/02/22 2349    enoxaparin Sodium (LOVENOX) injection 30 mg  30 mg SubCUTAneous Daily Musa Knapp MD   30 mg at 10/12/22 1140    mirtazapine (REMERON) tablet 7.5 mg  7.5 mg Oral Daily Cecilia Pena MD   7.5 mg at 10/12/22 1141    verapamil (CALAN) tablet 120 mg  120 mg Oral Nightly Cecilia Pena MD   120 mg at 10/12/22 2215    thiamine mononitrate tablet 100 mg  100 mg Oral Daily Cecilia Pena MD   100 mg at 10/12/22 1141    vitamin B-12 (CYANOCOBALAMIN) tablet 1,000 mcg  1,000 mcg Oral Daily Cecilia Pena MD   1,000 mcg at 10/12/22 1141    vitamin D (CHOLECALCIFEROL) tablet 1,000 Units  1,000 Units Oral Daily Cecilia Pena MD   1,000 Units at 10/12/22 1141    [Held by provider] clopidogrel (PLAVIX) tablet 75 mg  75 mg Oral Daily Cecilia Pena MD        atorvastatin (LIPITOR) tablet 80 mg  80 mg Oral Nightly Cecilia Pena MD   80 mg at 10/12/22 2215    aspirin chewable tablet 81 mg  81 mg Oral Daily Edmund Mattson MD   81 mg at 10/12/22 1141    melatonin tablet 6 mg  6 mg Oral Nightly Edmund Mattson MD   6 mg at 10/12/22 2215    multivitamin 1 tablet  1 tablet Oral Daily Edmund Mattson MD   1 tablet at 10/12/22 1141    potassium chloride (KLOR-CON M) extended release tablet 40 mEq  40 mEq Oral PRN Edmund Mattson MD        Or    potassium bicarb-citric acid (EFFER-K) effervescent tablet 40 mEq  40 mEq Oral PRN Edmund Mattson MD        Or    potassium chloride 10 mEq/100 mL IVPB (Peripheral Line)  10 mEq IntraVENous PRN Edmund Mattson MD   Stopped at 10/05/22 1816       Allergies:  No Known Allergies    Problem List:    Patient Active Problem List   Diagnosis Code    Altered mental status R41.82    Hypernatremia E87.0    Hypokalemia E87.6    GARRETT (acute kidney injury) ([facility] [address] [address] N17.9    Severe malnutrition ([facility] E43       Past Medical History:  History reviewed. No pertinent past medical history. Past Surgical History:  History reviewed. No pertinent surgical history.     Social History:    Social History     Tobacco Use    Smoking status: Never     Passive exposure: Never    Smokeless tobacco: Never   Substance Use Topics    Alcohol use: Not Currently                                Counseling given: No      Vital Signs (Current):   Vitals:    10/13/22 0430 10/13/22 0746 10/13/22 0843 10/13/22 1212   BP: 112/81 128/87  130/86   Pulse: 82 88 88 90   Resp: 15 16 15 14   Temp: 97.4 °F (36.3 °C) 98.5 °F (36.9 °C)     TempSrc: Axillary Axillary     SpO2:  96% 99% 97%   Weight:       Height:                                                  BP Readings from Last 3 Encounters:   10/13/22 130/86       NPO Status: Time of last liquid consumption: 0000                        Time of last solid consumption: 0000                        Date of last liquid consumption: 09/28/22                        Date of last solid food consumption: 09/28/22    BMI:   Wt Readings from Last 3 Encounters:   10/13/22 143 lb 15.4 oz (65.3 kg)     Body mass index is 28.12 kg/m².     CBC:   Lab Results   Component Value Date/Time    WBC 7.1 10/12/2022 05:31 AM    RBC 4.17 10/12/2022 05:31 AM    HGB 10.0 10/12/2022 05:31 AM    HCT 30.8 10/12/2022 05:31 AM    MCV 73.9 10/12/2022 05:31 AM    RDW 20.4 10/12/2022 05:31 AM     10/12/2022 05:31 AM       CMP:   Lab Results   Component Value Date/Time     10/12/2022 05:31 AM    K 4.3 10/12/2022 05:31 AM     10/12/2022 05:31 AM    CO2 25 10/12/2022 05:31 AM    BUN 7 10/12/2022 05:31 AM    CREATININE <0.5 10/12/2022 05:31 AM    GFRAA >60 10/12/2022 05:31 AM    AGRATIO 0.8 10/12/2022 05:31 AM    LABGLOM >60 10/12/2022 05:31 AM    GLUCOSE 98 10/12/2022 05:31 AM    PROT 6.2 10/12/2022 05:31 AM    CALCIUM 8.8 10/12/2022 05:31 AM    BILITOT <0.2 10/12/2022 05:31 AM    ALKPHOS 56 10/12/2022 05:31 AM    AST 21 10/12/2022 05:31 AM    ALT 10 10/12/2022 05:31 AM       POC Tests:   Recent Labs     10/13/22  0743   POCGLU 92       Coags: No results found for: PROTIME, INR, APTT    HCG (If Applicable): No results found for: PREGTESTUR, PREGSERUM, HCG, HCGQUANT     ABGs: No results found for: PHART, PO2ART, SXU2HOV, HKL7IXJ, BEART, Z6KCUQQE     Type & Screen (If Applicable):  No results found for: LABABO, LABRH    Drug/Infectious Status (If Applicable):  No results found for: HIV, HEPCAB    COVID-19 Screening (If Applicable): No results found for: COVID19        Anesthesia Evaluation  Patient summary reviewed no history of anesthetic complications:   Airway: Mallampati: II  TM distance: >3 FB   Neck ROM: full  Mouth opening: > = 3 FB   Dental: normal exam         Pulmonary:Negative Pulmonary ROS and normal exam  breath sounds clear to auscultation      (-) shortness of breath and not a current smoker                           Cardiovascular:Negative CV ROS            Rhythm: regular  Rate: normal Neuro/Psych:   (+) dementia             ROS comment: Not verbally responsive GI/Hepatic/Renal:   (+) renal disease: ARF,           Endo/Other:    (+) blood dyscrasia: anemia:., electrolyte abnormalities, . Abdominal:             Vascular: negative vascular ROS. Other Findings:   Not verbal  Left VM for Aunt to obtain consent - Derral Learn [phone]  Await call back          Anesthesia Plan      MAC     ASA 3       Induction: intravenous. Plan discussed with CRNA.     Attending anesthesiologist reviewed and agrees with Shoshana Bell MD   10/13/2022

## 2022-10-13 NOTE — PROGRESS NOTES
Patient admitted to PACU # 5 from OR at 1330 post ESOPHAGOGASTRODUODENOSCOPY WITH  PERCUTANEOUS ENDOSCOPIC GASTROSTOMY  TUBE PLACEMENT       EGD BIOPSY per Dr. Leatha Anand. Attached to PACU monitoring system and report received from anesthesia provider. Patient was reported to be hemodynamically stable during procedure. Patient sleepy from anesthesia. History of being non responsive pre-procedure. Continue to monitor. VSS.

## 2022-10-13 NOTE — ADDENDUM NOTE
Addendum  created 10/13/22 1413 by Mikal Belle MD    Order list changed, Order sets accessed, Pharmacy for encounter modified

## 2022-10-13 NOTE — CARE COORDINATION
[facility]  Palliative Care   Progress Note    NAME:  Tamika Whaley  MEDICAL RECORD NUMBER:  [id number]  AGE: 62 y.o. GENDER: female  : 1965  TODAY'S DATE:  10/13/2022    Subjective: no change in her condition. Objective:    Vitals:    10/13/22 0843   BP:    Pulse: 88   Resp: 15   Temp:    SpO2: 99%     Lab Results   Component Value Date    WBC 7.1 10/12/2022    HGB 10.0 (L) 10/12/2022    HCT 30.8 (L) 10/12/2022    MCV 73.9 (L) 10/12/2022     10/12/2022     Lab Results   Component Value Date    CREATININE <0.5 (L) 10/12/2022    BUN 7 10/12/2022     10/12/2022    K 4.3 10/12/2022     10/12/2022    CO2 25 10/12/2022     Lab Results   Component Value Date    ALT 10 10/12/2022    AST 21 10/12/2022    ALKPHOS 56 10/12/2022    BILITOT <0.2 10/12/2022       Plan: I have spoken to her aunt, Anat Silva she did speak with patient's son and he does want all aggressive measures for his mother, he is her only child. I have recorded his number in chart and informed Dr Janay Garcia of above she will reach out to him to let him know of her current condition. [address] son. Code Status: Full Code  Discharge Environment:  [] Hospice Consult Agency:  [] Inpatient Hospice    [] Home with  [facility]   [] ECF with Hospice  [] ECF skilled care with Hospice to follow   [] Other:    Teaching Time:  0hours  30 min     I will continue to follow Ms. Neville's care as needed. Thank you for allowing me to participate in the care of Ms. Neville .      Electronically signed by Carter Norris RN, BSN,CHPN on 10/13/2022 at 9:42 AM  [address] [place] [address]  Office: [phone]

## 2022-10-13 NOTE — PROGRESS NOTES
Access center contacted for transfer pt to [facility] for continuous EEG monitoring  Pt accepted at [facility], [facility].

## 2022-10-13 NOTE — PROGRESS NOTES
Progress Note    Updates  No changes overnight. Current Facility-Administered Medications:   levETIRAcetam (KEPPRA) 100 MG/ML solution 750 mg, 750 mg, Oral, BID  carvedilol (COREG) tablet 6.25 mg, 6.25 mg, Oral, BID WC  polyethylene glycol (GLYCOLAX) packet 17 g, 17 g, Oral, Daily PRN  sodium chloride flush 0.9 % injection 5-40 mL, 5-40 mL, IntraVENous, 2 times per day  sodium chloride flush 0.9 % injection 5-40 mL, 5-40 mL, IntraVENous, PRN  0.9 % sodium chloride infusion, , IntraVENous, PRN  ondansetron (ZOFRAN-ODT) disintegrating tablet 4 mg, 4 mg, Oral, Q8H PRN **OR** ondansetron (ZOFRAN) injection 4 mg, 4 mg, IntraVENous, Q6H PRN  acetaminophen (TYLENOL) tablet 650 mg, 650 mg, Oral, Q6H PRN **OR** acetaminophen (TYLENOL) suppository 650 mg, 650 mg, Rectal, Q6H PRN  enoxaparin Sodium (LOVENOX) injection 30 mg, 30 mg, SubCUTAneous, Daily  mirtazapine (REMERON) tablet 7.5 mg, 7.5 mg, Oral, Daily  verapamil (CALAN) tablet 120 mg, 120 mg, Oral, Nightly  thiamine mononitrate tablet 100 mg, 100 mg, Oral, Daily  vitamin B-12 (CYANOCOBALAMIN) tablet 1,000 mcg, 1,000 mcg, Oral, Daily  vitamin D (CHOLECALCIFEROL) tablet 1,000 Units, 1,000 Units, Oral, Daily  [Held by provider] clopidogrel (PLAVIX) tablet 75 mg, 75 mg, Oral, Daily  atorvastatin (LIPITOR) tablet 80 mg, 80 mg, Oral, Nightly  aspirin chewable tablet 81 mg, 81 mg, Oral, Daily  melatonin tablet 6 mg, 6 mg, Oral, Nightly  multivitamin 1 tablet, 1 tablet, Oral, Daily  potassium chloride (KLOR-CON M) extended release tablet 40 mEq, 40 mEq, Oral, PRN **OR** potassium bicarb-citric acid (EFFER-K) effervescent tablet 40 mEq, 40 mEq, Oral, PRN **OR** potassium chloride 10 mEq/100 mL IVPB (Peripheral Line), 10 mEq, IntraVENous, PRN    Exam  Blood pressure 112/81, pulse 88, temperature 97.4 °F (36.3 °C), temperature source Axillary, resp. rate 15, height 5' (1.524 m), weight 143 lb 15.4 oz (65.3 kg), SpO2 99 %.   Constitutional    Vital signs: BP, HR, and RR reviewed   General no distress. Eyes: unable to visualize the fundi  Cardiovascular: no peripheral edema. Psychiatric: no psychotic behavior noted. Neurologic  Mental status:   orientation non verbal.     Attention poor   Language non verbal.     Comprehension she is not following any commands at this time. Cranial nerves:   CN2: + corneal reflexes. CN 3,4,6: tends to have R gaze preference though not persistent. She did appear to cross midline on occasion when I was manipulating her LUE. Pupils are equal, round, reactive. CN7: face symmetric   CN8: hearing fortino. CN12: fortino. Strength: fortino. Clearly less movement on the R.    Sensory: with any sort of noxious pain stimulus she seemed to get upset and begin crying. She does withdraw to trapezius pressure and noxious stimulus in all 4 limbs. When I would apply pressure to her L earlobe she would reach up w/ her LUE. Cerebellar/coordination: finger nose finger fortino. Tone: decreased RUE/BLE. Gait: deferred at this time given mental status. ROS - chart reviewed. Afebrile. Labs  Folate 5.10  B12 - 1724    CRP 21.3    WALTER (June) + 1:640    LDL 97  HgA1c 5.4    WBC 7.1K    UA small LE, 15 WBC, 1+ bacteria    Studies  CT head w/o 10/11/22  Impression   Subacute infarction in the parasagittal left frontal lobe. Old lacunar infarcts in the central thanh, bilateral basal ganglia and   bilateral thalami. Mild parenchymal volume loss. Moderate chronic microvascular disease. MRI brain w/o 10/3/22  Impression   Left MARCELO distribution infarct without hemorrhagic transformation. Mild   corresponding T2 FLAIR hyperintensity is identified. Mild to moderate chronic microvascular disease and involutional changes. Scattered areas of chronic lacunar type infarcts are identified both basal   ganglia regions and thalamic regions and left greater than right side of the   thanh. CTA head/neck 10/6/22  Impression   1.  Occlusion of the proximal left A2 segment. 2. Mid basilar trunk aneurysm, ventrally directed with the base measuring 4   mm in width, 2.5 mm base to apex measurement. 3. Tortuous appearance of the bilateral distal cervical ICAs. 4. Mild-to-moderate luminal irregularity involving the PCAs. 5. Correlate with concerns for underlying arteriopathy/vasculopathy. MRA head/neck 10/3/22  No hemodynamically significant stenosis. Repeat EEG 10/5/22  Bilateral, often independent sharp waves in the left greater than right temporal regions. Bitemporal and bifrontal slowing, right somewhat greater than left. Generalized background slowing and disorganization. No recorded seizures. EEG 10/3/22  Occasional sharp waves in the left temporal region, which appear most likely  consistent with focal epileptiform discharges as well as mild background  slowing and disorganization. No recorded seizures. MARLO 10/12/22  Negative. Impression/Recommendations  Embolic stroke. Also had stroke in June. The etiology is not clear. MARLO negative. Hypercoagulable workup was unrevealing. She has been on telemetry w/out any atrial fibrillation/flutter to my knowledge. In addition to her recent strokes, chart suggests she has been declining both functionally and cognitively over the last 6 months or so. The possibility of CNS vasculitis versus alternative causes of her overall clinical picture were explored in June but at the time felt to be less likely. She does have baseline developmental delay and a hx of neurofibromatosis. In addition she has had abnormal EEGs though no recorded seizures this admission. She has been maintained on LEV here, 750 mg BID. Given no real clinical improvement, I think it would be best to transfer to a higher level of care for cvEEG to definitively rule out subclinical seizure activity and perhaps further investigation into her progressive decline and recurrent strokes. Discussed w/ hospitalist.      Jocelynn Shaikh NP  [address] [place] [address] [id number] Neurology    A copy of this note was provided for Dr Jesenia Vasquez MD

## 2022-10-13 NOTE — PROGRESS NOTES
Comprehensive Nutrition Assessment    Type and Reason for Visit:  Reassess    Nutrition Recommendations/Plan:   Continue Jevity 1.5 @ 45ml/hr, flush per provider     Malnutrition Assessment:  Malnutrition Status:  Severe malnutrition (10/06/22 1228)    Context:  Chronic Illness     Findings of the 6 clinical characteristics of malnutrition:  Energy Intake:  75% or less estimated energy requirements for 1 month or longer  Weight Loss:  Greater than 20% over 1 year     Body Fat Loss:  Unable to assess     Muscle Mass Loss:  Unable to assess    Fluid Accumulation:  Unable to assess     Strength:  Not Performed    Nutrition Assessment:    Follow up. Per documentation, pt TF running @ goal rate and is being tolerated well. Swallow eval unable to be completed due to pt not awakening. Consent for PEG placement obtained. No changes to TF regimen needed at this time. Nutrition Related Findings:    non-pitting edema; BM 10/2 Wound Type: None       Current Nutrition Intake & Therapies:    Average Meal Intake: NPO  Average Supplements Intake: NPO  Diet NPO  ADULT TUBE FEEDING; Nasogastric; Standard with Fiber; Continuous; 25; Yes; 10; Q 6 hours; 45; 100; Q 4 hours  Current Tube Feeding (TF) Orders:  Feeding Route: Nasogastric  Formula: Standard with Fiber  Schedule: Continuous  Feeding Regimen: @ 45 mL/hr  Additives/Modulars: None  Water Flushes: 100 mL q 4 hrs  Current TF & Flush Orders Provides: 900ml TV, 1350 kcals, 57 grams protein, and 684ml free water + 600 ml water flush  Goal TF & Flush Orders Provides: Jevity 1.5 @ 45 ml/hr to provide 900 ml TV, 1350 kcals, 57 g protein, 684 ml free water + 600 ml water flush    Anthropometric Measures:  Height: 5' (152.4 cm)  Ideal Body Weight (IBW): 100 lbs (45 kg)    Admission Body Weight: 131 lb (59.4 kg)  Current Body Weight: 147 lb (66.7 kg), 147 % IBW.  Weight Source: Bed Scale  Current BMI (kg/m2): 28.7        Weight Adjustment For: No Adjustment                 BMI Categories: Overweight (BMI 25.0-29. 9)    Estimated Daily Nutrient Needs:  Energy Requirements Based On: Kcal/kg  Weight Used for Energy Requirements: Admission  Energy (kcal/day): 6077-9151 (20-25kcal/62kg)  Weight Used for Protein Requirements: Ideal  Protein (g/day): 54-63 (1.2-1.4g/45kg)  Method Used for Fluid Requirements: 1 ml/kcal  Fluid (ml/day): 1 ml/kcal    Nutrition Diagnosis:   Severe malnutrition related to inadequate protein-energy intake as evidenced by Criteria as identified in malnutrition assessment, NPO or clear liquid status due to medical condition    Nutrition Interventions:   Food and/or Nutrient Delivery: Continue Current Tube Feeding  Nutrition Education/Counseling: Education not indicated  Coordination of Nutrition Care: Continue to monitor while inpatient       Goals:  Previous Goal Met: Goal(s) Achieved  Goals: Tolerate nutrition support at goal rate       Nutrition Monitoring and Evaluation:   Behavioral-Environmental Outcomes: None Identified  Food/Nutrient Intake Outcomes: Enteral Nutrition Intake/Tolerance  Physical Signs/Symptoms Outcomes: Weight, Nutrition Focused Physical Findings, Biochemical Data, GI Status    Discharge Planning:     Too soon to determine     Kike Arita, [address]  Contact: [phone]

## 2022-10-13 NOTE — H&P
Pre-operative History and Physical    Patient: Laina Concepcion  : 1965  Acct#:     Intended Procedure:  PEG placement    HISTORY OF PRESENT ILLNESS:  The patient is a 62 y.o. female  who presents for/due to Dysphagia     Past Medical History:    History reviewed. No pertinent past medical history. Past Surgical History:    History reviewed. No pertinent surgical history. Medications Prior to Admission:   Prior to Admission medications    Medication Sig Start Date End Date Taking? Authorizing Provider   aspirin 81 MG chewable tablet Take 81 mg by mouth daily   Yes Historical Provider, MD   atorvastatin (LIPITOR) 80 MG tablet Take 80 mg by mouth nightly   Yes Historical Provider, MD   vitamin D (CHOLECALCIFEROL) 25 MCG (1000 UT) TABS tablet Take 1,000 Units by mouth daily   Yes Historical Provider, MD   clopidogrel (PLAVIX) 75 MG tablet Take 75 mg by mouth daily   Yes Historical Provider, MD   vitamin B-12 (CYANOCOBALAMIN) 1000 MCG tablet Take 1,000 mcg by mouth daily   Yes Historical Provider, MD   ferrous sulfate (IRON 325) 325 (65 Fe) MG tablet Take 325 mg by mouth every other day   Yes Historical Provider, MD   levETIRAcetam (KEPPRA) 500 MG tablet Take 500 mg by mouth 2 times daily   Yes Historical Provider, MD   lisinopril (PRINIVIL;ZESTRIL) 40 MG tablet Take 40 mg by mouth daily   Yes Historical Provider, MD   melatonin 3 MG TABS tablet Take 6 mg by mouth at bedtime   Yes Historical Provider, MD   Multiple Vitamin (MULTIVITAMIN ADULT) TABS Take 1 tablet by mouth daily   Yes Historical Provider, MD   mirtazapine (REMERON) 7.5 MG tablet Take 7.5 mg by mouth daily For appetite   Yes Historical Provider, MD   vitamin B-1 (THIAMINE) 100 MG tablet Take 100 mg by mouth daily   Yes Historical Provider, MD   verapamil (CALAN) 120 MG tablet Take 120 mg by mouth at bedtime   Yes Historical Provider, MD       Allergies:  Patient has no known allergies.     Social History:   TOBACCO:   reports that she has never smoked. She has never been exposed to tobacco smoke. She has never used smokeless tobacco.  ETOH:   reports that she does not currently use alcohol. DRUGS:   has no history on file for drug use. PHYSICAL EXAM:      Vital Signs: /86   Pulse 90   Temp 98.5 °F (36.9 °C) (Axillary)   Resp 14   Ht 5' (1.524 m)   Wt 143 lb 15.4 oz (65.3 kg)   SpO2 97%   BMI 28.12 kg/m²    Airway: No stridor or wheezing noted. Good air movement  Pulmonary: without wheezes. Clear to auscultation  Cardiac:regular rate and rhythm without loud murmurs  Abdomen:soft, nontender,  Bowel sounds present    Pre-Procedure Assessment / Plan:  1) PEG placement    ASA Grade:  ASA 3 - Patient with moderate systemic disease with functional limitations  Mallampati Classification:  Class II    Level of Sedation Plan:Deep sedation    Post Procedure plan: Return to same level of care    I assessed the patient and find that the patient is in satisfactory condition to proceed with the planned procedure and sedation plan. I have explained the risk, benefits, and alternatives to the procedure; the patient understands and agrees to proceed.        Sukhdeep Dahl MD  10/13/2022

## 2022-10-13 NOTE — OP NOTE
Endoscopy Note    Patient: Joyce Minor   : 1965  Acct#:     Procedure: Esophagogastroduodenoscopy with percutaneous endoscopic gastrostomy tube placement. Date:  10/13/2022     Surgeon: Sherl Dance, MD    Referring Physician:  Lebron Black MD     Preoperative Diagnosis: 1. Dysphagia     Postoperative Diagnosis:  1. Successful PEG placement 2. Duodenal Nodules-Biopsied    Anesthesia: The patient was administered IV propofol per anesthesiology team.  Please see their operative records for full details. Consent:  The patient or their legal guardian has signed an informed consent, and is aware of the potential risks, benefits, alternatives, and potential complications of this procedure. These include, but are not limited to hemorrhage, bleeding, post procedural pain, perforation, phlebitis, aspiration, hypotension, hypoxia, cardiovascular events such as arryhthmia, and possibly death. Procedure: Informed consent was obtained from the patient and the patient's power of  after explanation of indications, benefits, possible risks and complications of the procedure. The patient was then taken to the endoscopy suite, placed in the supine position with the head of the bed elevated at 45 degrees, and the above IV anesthesia was administered. An Olympus video endoscope was place in the patient's mouth, and under direct visualization passed into the esophagus. Visualization of the esophagus demonstrated normal mucosa. Scope was then advanced into the stomach. Visualization of the gastric body and antrum demonstrated normal mucosa. The pylorus was patent, and the scope was advance into the duodenum. Visualization of the duodenal bulb showed several lobulated 5-6 mm nodules in the duodenal bulb. Biopsied. The second portion of the duodenum was also normal.  The scope was then withdrawn back into the stomach.     Transillumination and finger denting were accomplished successfully through the skin of the anterior abdominal wall in the left upper quadrant. This area was marked, prepped and draped in sterile fashion and infiltrated with 1% Xylocaine solution. A 1 cm transverse skin incision was made with #11 scalpel blade. A #14 gauge Angiocath was then passed through the skin incision puncturing the gastric lumen under endoscopic visualization. The metal trocar was removed and a guidewire was passed through the Angiocath into the gastric lumen where it was grasped with an electrocautery snare, passed through the scope. The scope snare and guidewire were then pulled back through the proximal stomach, esophagus and out through the patient's mouth, leaving the guidewire protruding from the mouth and protruding from the skin at the skin incision. A #20 [place] percutaneous endoscopic gastrostomy tube was then attached to the guidewire at the mouth. The guidewire at the skin was grasped and pulled, pulling the gastrostomy tube and guidewire though the gastrostomy incision until the intragastric bumper was snug against the intragastric wall. A second external bumper was advance over the external portion of the gastrostomy tube and fixed at the 2.5 cm boogie at the skin. The gastrostomy incision was then dressed with iodoform ointment and a dry, sterile, gauze dressing. A Y connector was attached to the end of the gastrostomy tube. The patient tolerated the procedure well and was taken to the post anesthesia care unit in good condition. Recommendations:  - You may use the PEG tube for medications now and keep clamped for 1-2 hours after giving the medication.  - Do not start tube feeds for 24 hours. - Do not place gauze or other dressings between skin and external bumper. - If used long term, PEG should be changed every 3-6 months depending on tube condition.     Alvina Armenta MD

## 2022-10-13 NOTE — PROGRESS NOTES
PACU Transfer Note    Vitals:    10/13/22 1400   BP: 126/87   Pulse: 94   Resp: 20   Temp: 97.2 °F (36.2 °C)   SpO2: 98%       In: 300 [I.V.:300]  Out: -     Pain assessment:  not receiving treatment       Report given to Receiving unit April RN.    10/13/2022 2:03 PM

## 2022-10-13 NOTE — PLAN OF CARE
Problem: Neurosensory - Adult  Goal: Absence of seizures  Outcome: Progressing     Problem: Safety - Adult  Goal: Free from fall injury  Outcome: Progressing

## 2022-10-13 NOTE — ANESTHESIA POSTPROCEDURE EVALUATION
Department of Anesthesiology  Postprocedure Note    Patient: Isaias Archer  MRN: [id number]  YOB: 1965  Date of evaluation: 10/13/2022      Procedure Summary     Date: 10/13/22 Room / Location: [address] [place] [address]    Anesthesia Start: 4350 Anesthesia Stop: 1835    Procedures:       ESOPHAGOGASTRODUODENOSCOPY WITH  PERCUTANEOUS ENDOSCOPIC GASTROSTOMY  TUBE PLACEMENT      EGD BIOPSY Diagnosis:       Dysphagia, unspecified type      (dysphagia)    Surgeons: Jorge Martin MD Responsible Provider: Jackelin Kent MD    Anesthesia Type: MAC ASA Status: 3          Anesthesia Type: No value filed.     Omi Phase I: Omi Score: 5    Omi Phase II:        Anesthesia Post Evaluation    Patient location during evaluation: PACU  Patient participation: complete - patient participated  Level of consciousness: awake  Airway patency: patent  Nausea & Vomiting: no nausea  Complications: no  Cardiovascular status: hemodynamically stable  Respiratory status: acceptable  Hydration status: euvolemic  Multimodal analgesia pain management approach

## 2022-10-13 NOTE — PROGRESS NOTES
Physical Therapy    Status Note  10/13/2022  Sunshine Avelar  [id number]  [id number]    Patient currently off of floor for PEG tube placement. Will attempt to see 10-14-22 as patient able to participate.     Electronically signed by Miranda Barrientos[address] (#[phone])  on 10/13/2022 at 2:27 PM

## 2022-10-14 LAB
ANION GAP SERPL CALCULATED.3IONS-SCNC: 8 MMOL/L (ref 3–16)
BASOPHILS ABSOLUTE: 0 K/UL (ref 0–0.2)
BASOPHILS RELATIVE PERCENT: 0.6 %
BUN BLDV-MCNC: 10 MG/DL (ref 7–20)
CALCIUM SERPL-MCNC: 9.1 MG/DL (ref 8.3–10.6)
CHLORIDE BLD-SCNC: 104 MMOL/L (ref 99–110)
CO2: 27 MMOL/L (ref 21–32)
CREAT SERPL-MCNC: <0.5 MG/DL (ref 0.6–1.1)
EOSINOPHILS ABSOLUTE: 0.3 K/UL (ref 0–0.6)
EOSINOPHILS RELATIVE PERCENT: 5.7 %
GFR AFRICAN AMERICAN: >60
GFR NON-AFRICAN AMERICAN: >60
GLUCOSE BLD-MCNC: 88 MG/DL (ref 70–99)
HCT VFR BLD CALC: 30.4 % (ref 36–48)
HEMOGLOBIN: 9.7 G/DL (ref 12–16)
LYMPHOCYTES ABSOLUTE: 0.9 K/UL (ref 1–5.1)
LYMPHOCYTES RELATIVE PERCENT: 14.9 %
MCH RBC QN AUTO: 23.8 PG (ref 26–34)
MCHC RBC AUTO-ENTMCNC: 31.9 G/DL (ref 31–36)
MCV RBC AUTO: 74.7 FL (ref 80–100)
MONOCYTES ABSOLUTE: 0.3 K/UL (ref 0–1.3)
MONOCYTES RELATIVE PERCENT: 5.5 %
NEUTROPHILS ABSOLUTE: 4.5 K/UL (ref 1.7–7.7)
NEUTROPHILS RELATIVE PERCENT: 73.3 %
PDW BLD-RTO: 20.1 % (ref 12.4–15.4)
PLATELET # BLD: 430 K/UL (ref 135–450)
PMV BLD AUTO: 8.4 FL (ref 5–10.5)
POTASSIUM REFLEX MAGNESIUM: 4 MMOL/L (ref 3.5–5.1)
RBC # BLD: 4.07 M/UL (ref 4–5.2)
SODIUM BLD-SCNC: 139 MMOL/L (ref 136–145)
WBC # BLD: 6.1 K/UL (ref 4–11)

## 2022-10-14 PROCEDURE — 2060000000 HC ICU INTERMEDIATE R&B

## 2022-10-14 PROCEDURE — 6370000000 HC RX 637 (ALT 250 FOR IP): Performed by: NURSE PRACTITIONER

## 2022-10-14 PROCEDURE — 36415 COLL VENOUS BLD VENIPUNCTURE: CPT

## 2022-10-14 PROCEDURE — 85025 COMPLETE CBC W/AUTO DIFF WBC: CPT

## 2022-10-14 PROCEDURE — 2580000003 HC RX 258: Performed by: INTERNAL MEDICINE

## 2022-10-14 PROCEDURE — 95813 EEG EXTND MNTR 61-119 MIN: CPT

## 2022-10-14 PROCEDURE — 80048 BASIC METABOLIC PNL TOTAL CA: CPT

## 2022-10-14 PROCEDURE — 6370000000 HC RX 637 (ALT 250 FOR IP): Performed by: INTERNAL MEDICINE

## 2022-10-14 PROCEDURE — 51798 US URINE CAPACITY MEASURE: CPT

## 2022-10-14 PROCEDURE — 6360000002 HC RX W HCPCS: Performed by: INTERNAL MEDICINE

## 2022-10-14 PROCEDURE — APPNB45 APP NON BILLABLE 31-45 MINUTES: Performed by: NURSE PRACTITIONER

## 2022-10-14 RX ORDER — LEVETIRACETAM 100 MG/ML
1500 SOLUTION ORAL 2 TIMES DAILY
Status: DISCONTINUED | OUTPATIENT
Start: 2022-10-14 | End: 2022-10-14

## 2022-10-14 RX ORDER — LEVETIRACETAM 100 MG/ML
1000 SOLUTION ORAL 2 TIMES DAILY
Status: DISCONTINUED | OUTPATIENT
Start: 2022-10-14 | End: 2022-10-17 | Stop reason: HOSPADM

## 2022-10-14 RX ORDER — LEVETIRACETAM 100 MG/ML
750 SOLUTION ORAL ONCE
Status: COMPLETED | OUTPATIENT
Start: 2022-10-14 | End: 2022-10-14

## 2022-10-14 RX ADMIN — LEVETIRACETAM 750 MG: 100 SOLUTION ORAL at 16:27

## 2022-10-14 RX ADMIN — Medication 6 MG: at 21:30

## 2022-10-14 RX ADMIN — ENOXAPARIN SODIUM 40 MG: 100 INJECTION SUBCUTANEOUS at 08:39

## 2022-10-14 RX ADMIN — CARVEDILOL 6.25 MG: 6.25 TABLET, FILM COATED ORAL at 16:27

## 2022-10-14 RX ADMIN — ATORVASTATIN CALCIUM 80 MG: 80 TABLET, FILM COATED ORAL at 21:30

## 2022-10-14 RX ADMIN — Medication 100 MG: at 08:38

## 2022-10-14 RX ADMIN — VERAPAMIL HYDROCHLORIDE 120 MG: 80 TABLET ORAL at 21:30

## 2022-10-14 RX ADMIN — ASPIRIN 81 MG 81 MG: 81 TABLET ORAL at 08:38

## 2022-10-14 RX ADMIN — Medication 12 ML: at 21:40

## 2022-10-14 RX ADMIN — CLOPIDOGREL BISULFATE 75 MG: 75 TABLET ORAL at 08:38

## 2022-10-14 RX ADMIN — THERA TABS 1 TABLET: TAB at 08:38

## 2022-10-14 RX ADMIN — CARVEDILOL 6.25 MG: 6.25 TABLET, FILM COATED ORAL at 08:38

## 2022-10-14 RX ADMIN — Medication 1000 UNITS: at 08:38

## 2022-10-14 RX ADMIN — MIRTAZAPINE 7.5 MG: 15 TABLET, FILM COATED ORAL at 08:38

## 2022-10-14 RX ADMIN — LEVETIRACETAM 750 MG: 100 SOLUTION ORAL at 08:39

## 2022-10-14 RX ADMIN — CYANOCOBALAMIN TAB 1000 MCG 1000 MCG: 1000 TAB at 08:38

## 2022-10-14 RX ADMIN — LEVETIRACETAM 1000 MG: 100 SOLUTION ORAL at 21:31

## 2022-10-14 NOTE — DISCHARGE SUMMARY
Hospital Medicine Discharge Summary    Patient ID: Lilia Friedman      Patient's PCP: Tyroen Gray MD    Admit Date: 9/29/2022     Discharge Date:   1013/22    Admitting Provider: Poornima Kate MD     Discharge Provider: Donna Wilhelm MD     Discharge Diagnoses: Active Hospital Problems    Diagnosis     Severe malnutrition ([facility] [address] [E43]      Priority: Medium    Altered mental status [R41.82]      Priority: Medium    Hypernatremia [E87.0]      Priority: Medium    Hypokalemia [E87.6]      Priority: Medium    GARRETT (acute kidney injury) ([facility] [address] [N17.9]      Priority: Medium       The patient was seen and examined on day of discharge and this discharge summary is in conjunction with any daily progress note from day of discharge. Hospital Course:  Lilia Friedman is 62 y.o. female with history of developmental delay, seizure disorder, stroke, sickle cell disease. Per report, at baseline patient is verbally interactive but since yesterday patient has not been talking  She is brought to the ED from her nursing home for altered mental status  On interview, she does not say a word although she is awake. She is unable to answer any questions to me. She can make eye contact and withdraws from  Work-up reveals severe hypernatremia sodium 159, hypokalemia potassium 2.8 and GARRETT which suggest severe dehydration      Acute metabolic encephalopathy - unclear etiology, didn't improve significantly with electrolyte correction.  Repeat U/A showed pyuria.  -continue D5W  -nephrology consulted, recs appreciated  -BMP q6h  -neurology consulted, recs appreciated  -check procalcitonin  -check molecular PCR panel including COVID  -MRI Brain without contrast with left MARCELO stroke  -MRA Head and Neck with contrast reviewed  -EEG reviewed, abnormal - cont keppra  -TSH WNL  - discussed with aunt, wants to have PEG tube placed, GI consulted  - hold plavix in anticipation of need for PEG, discussed with aunt the risks of further stroke given holding plavix, she agreed to hold plavix to consider PEG tube  - NG tube placed, ok to place restraints if pt pulling out NG, started TF  - plan PEG tube today     Acute CVA - MRI reviewed, neurology following. On ASA/plavix/statin. Ordered MARLO and unable to be done 10/7 (friday) since aunt unable to reached on phone for consent, s/p MARLO on 10/12/22 with no cardiac thrombus  consulted hem/onc for suspected hypercoag work-up per neuro recs - no further work-up per hem/onc  Holding plavix for potential PEG  Repeat CT head 10/11 with no new changes     Pyuria - UTI ruled out after further study  -stop ceftriaxone  -urine culture showed mixed jovon only     Hypernatremia - suspect due to severe dehydration, resolved  -off IVF  -nephrology consulted, recs appreciated  -BMP q6h     Hypokalemia  -replace PRN  -trend and monitor  -nephrology following     GARRETT - resolved  - likely prerenal due to severe dehydration, improved  -continue IVF  -nephrology managing fluids  -avoid nephrotoxic medications  -hold home ACEi  -trend BMP     Hx CVA  -continue home meds including DAPT and high intensity statin     Hx seizure disorder  -change to IV Keppra and continue  - EEG reviewed     Developmental delay  Sickle cell disease    Given no improvement in mental status, neurology recommended transfer to [facility] for continuous EEG monitoring to r/o seizures as cause of AMS       Physical Exam Performed:     BP (!) 149/94   Pulse 93   Temp 98.5 °F (36.9 °C) (Axillary)   Resp 16   Ht 5' (1.524 m)   Wt 143 lb 15.4 oz (65.3 kg)   SpO2 95%   BMI 28.12 kg/m²          General appearance: Lethargic, chronically ill appearing  HEENT: Pupils equal, round, and reactive to light. Conjunctivae/corneas clear. Neck: Supple, with full range of motion. No jugular venous distention. Trachea midline.   Respiratory:  diminished b/l  Cardiovascular: Regular rate and rhythm with normal S1/S2   Abdomen: Soft, non-tender, non-distended with normal bowel sounds. Musculoskeletal: No clubbing, cyanosis or edema bilaterally. Full range of motion without deformity. Skin: Skin color, texture, turgor normal.  No rashes or lesions. Neurologic:  unable to exam, pt not responsive  Psychiatric: Lethargic. Capillary Refill: Brisk,< 3 seconds   Peripheral Pulses: +2 palpable, equal bilaterally       Labs: For convenience and continuity at follow-up the following most recent labs are provided:      CBC:    Lab Results   Component Value Date/Time    WBC 7.1 10/12/2022 05:31 AM    HGB 10.0 10/12/2022 05:31 AM    HCT 30.8 10/12/2022 05:31 AM     10/12/2022 05:31 AM       Renal:    Lab Results   Component Value Date/Time     10/12/2022 05:31 AM    K 4.3 10/12/2022 05:31 AM     10/12/2022 05:31 AM    CO2 25 10/12/2022 05:31 AM    BUN 7 10/12/2022 05:31 AM    CREATININE <0.5 10/12/2022 05:31 AM    CALCIUM 8.8 10/12/2022 05:31 AM    PHOS 4.0 10/13/2022 06:40 AM         Significant Diagnostic Studies    Radiology:   CT HEAD WO CONTRAST   Final Result   Subacute infarction in the parasagittal left frontal lobe. Old lacunar infarcts in the central thanh, bilateral basal ganglia and   bilateral thalami. Mild parenchymal volume loss. Moderate chronic microvascular disease. XR CHEST PORTABLE   Final Result   Status post feeding tube placement which is looped in the distal stomach with   the tip in the fundus. Recommend readjusting the tip into the distal stomach   for more physiologic positioning. Stable mild cardiomegaly with resolving central pulmonary congestion and   slowly resolving left basilar opacity. XR CHEST PORTABLE   Final Result   Nasogastric tube in good position with a non-specific gas pattern. XR CHEST PORTABLE   Final Result   Enteric tube in appropriate position. CTA HEAD NECK W CONTRAST   Final Result   1. Occlusion of the proximal left A2 segment.    2. Mid basilar trunk aneurysm, ventrally directed with the base measuring 4   mm in width, 2.5 mm base to apex measurement. 3. Tortuous appearance of the bilateral distal cervical ICAs. 4. Mild-to-moderate luminal irregularity involving the PCAs. 5. Correlate with concerns for underlying arteriopathy/vasculopathy. CT HEAD WO CONTRAST   Final Result   1. Subacute infarct in the left frontal lobe in the distribution of the MARCELO   characterized on recent CT and MRI. 2. On the current exam, there is no pattern of significant edema or mass   effect relating to this infarct. No new area of edema is appreciated. 3. No evidence of hemorrhagic transformation. Findings were discussed with Elaina Solano RN, at 12:41 pm on 10/6/2022. MRI BRAIN WO CONTRAST   Final Result   Left MARCELO distribution infarct without hemorrhagic transformation. Mild   corresponding T2 FLAIR hyperintensity is identified. Mild to moderate chronic microvascular disease and involutional changes. Scattered areas of chronic lacunar type infarcts are identified both basal   ganglia regions and thalamic regions and left greater than right side of the   thanh. The findings were sent to the Radiology Results [id number] at 5:43   pm on 10/3/2022 to be communicated to a licensed caregiver. MRA HEAD WO CONTRAST   Final Result   No definite large vessel occlusion to account for the left MARCELO infarct on the   MRI. MRA NECK WO CONTRAST   Final Result   Negative for hemodynamic stenosis. XR ABDOMEN (KUB) (SINGLE AP VIEW)   Final Result   No radiopaque foreign body is evident. CT HEAD WO CONTRAST   Final Result   1. No acute intracranial bleed. 2. Multifocal age indeterminate lacunar strokes involve right centrum   semiovale, bilateral basal ganglia and left caudate lobe.    3.  White matter hypoattenuation described is typical of microvascular   ischemic disease or as sequela of dysmyelinating/demyelinating processes. 4.  Vascular calcifications are noted reflecting calcific atherosclerosis. CT CHEST PULMONARY EMBOLISM W CONTRAST   Final Result   No evidence of pulmonary embolism or acute pulmonary abnormality. Mild left   lower lobe atelectatic changes. XR CHEST PORTABLE   Final Result   No radiographic evidence of acute cardiopulmonary disease.                 Consults:     IP CONSULT TO NEPHROLOGY  IP CONSULT TO NEUROLOGY  IP CONSULT TO PALLIATIVE CARE  IP CONSULT TO HEM/ONC  IP CONSULT TO GI  IP CONSULT TO DIETITIAN    Disposition:  transfer to [facility]     Condition at Discharge: Stable    Discharge Instructions/Follow-up:  PCP in 1 week    Code Status:  Full Code     Activity: activity as tolerated    Diet: regular diet      Discharge Medications:     Current Discharge Medication List             Details   aspirin 81 MG chewable tablet Take 81 mg by mouth daily      atorvastatin (LIPITOR) 80 MG tablet Take 80 mg by mouth nightly      vitamin D (CHOLECALCIFEROL) 25 MCG (1000 UT) TABS tablet Take 1,000 Units by mouth daily      clopidogrel (PLAVIX) 75 MG tablet Take 75 mg by mouth daily      vitamin B-12 (CYANOCOBALAMIN) 1000 MCG tablet Take 1,000 mcg by mouth daily      ferrous sulfate (IRON 325) 325 (65 Fe) MG tablet Take 325 mg by mouth every other day      levETIRAcetam (KEPPRA) 500 MG tablet Take 500 mg by mouth 2 times daily      lisinopril (PRINIVIL;ZESTRIL) 40 MG tablet Take 40 mg by mouth daily      melatonin 3 MG TABS tablet Take 6 mg by mouth at bedtime      Multiple Vitamin (MULTIVITAMIN ADULT) TABS Take 1 tablet by mouth daily      mirtazapine (REMERON) 7.5 MG tablet Take 7.5 mg by mouth daily For appetite      vitamin B-1 (THIAMINE) 100 MG tablet Take 100 mg by mouth daily      verapamil (CALAN) 120 MG tablet Take 120 mg by mouth at bedtime             Time Spent on discharge is more than 30 minutes in the examination, evaluation, counseling and review of medications and discharge plan. Signed:    Poli Grimes MD   10/13/2022      Thank you Emma Joel MD for the opportunity to be involved in this patient's care. If you have any questions or concerns, please feel free to contact me at [phone].

## 2022-10-14 NOTE — PROGRESS NOTES
Pharmacist Review and Automatic Dose Adjustment of Prophylactic Enoxaparin    The reviewing pharmacist has made an adjustment to the ordered enoxaparin dose or converted to UFH per the approved [facility] protocol and table as defined below. Plan / Rationale: Based upon the patient's weight and renal function, the ordered dose of 30 mg daily has been converted to 40 mg daily. Thank you,  Erickson Hardy, PharmD, BCPS  10/14/2022, 1:02 AM      Laina Concepcion is a 62 y.o. female. Recent Labs     10/11/22  0518 10/12/22  0531   CREATININE <0.5* <0.5*       Estimated Creatinine Clearance: 105 mL/min (based on SCr of 0.5 mg/dL). Recent Labs     10/11/22  0518 10/12/22  0531   HGB 10.3* 10.0*   HCT 31.0* 30.8*    423     No results for input(s): INR in the last 72 hours.     Height:   Ht Readings from Last 1 Encounters:   10/10/22 5' (1.524 m)     Weight:  Wt Readings from Last 1 Encounters:   10/13/22 143 lb 15.4 oz (65.3 kg)

## 2022-10-14 NOTE — CONSULTS
Neurology / Genea Males Consult Note    Fe Calixto MD is requesting this consult. Reason for Consult: altered mental status, concern for subclinical seizures      History of Present Illness   History on patient was obtained through chart review as patient is presently non-verbal and has altered mental status. Lilia Friedman is a 62 y.o. y/o female with PMH significant for  neurofibromatosis, developmental delay, seizure disorder on Keppra, prior stroke June 2022 (Left basal ganglia and L thanh), and sickle cell disease (possible lupus). She resided in a nursing facility and was brought from that facility to the ED on 9/29/22 with increased altered mental status. Prior to this episode the patient had normally been conversive and responsive but wheelchair bound. She had been admitted to the hospital at [facility] from 9/29-10/13 for acute metabolic encephalopathy and GARRETT. While in the hospital she was not able to follow commands, she would make eye contact and withdrawal to pain. Neurology was consulted on 10/1/22. MRI / MRA of brain showed Left MARCELO distribution infarct without hemorrhagic transformation, mild corresponding T2 FLAIR hyperintensity. Mild to moderate chronic microvascular disease and involutional changes. There are scattered areas of chronic lacunar type infarcts that are identified both basal ganglia regions and thalamic regions and left greater than right side of the thanh. There were no large vessel occlusions on the MRA. Stroke was concerning to be embolic in natures. There were two abnormal routine EEGs with no recorded seizures. She was continued on DAPT and home medication, She had a cardiac workup with MARLO on 10/12 which showed no LAMBERT thrombus, no shunt and was structurally normal. Hematology consult who reviewed her extensive hematology work up in June at [facility] with coagulation studies. The only abnormal test from their reviewed was a factor 8 level.  She was noted not to have antiphos ab syndrome based on lab review. She had mildly elevated cardiolipin igm. Management of anticoagulation choice was deferred to neurology. 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 She continued to show no improve in her mental status over the course of her hospital stay at [facility]. She was transferred to [facility] on 10/13/22 for cvEEG for definitive rule out of underlying seizure activity. REVIEW OF SYSTEMS: unable to obtain      Past Medical, Surgical, Family, and Social History   PAST MEDICAL HISTORY:  No past medical history on file. SURGICAL HISTORY:  Past Surgical History:   Procedure Laterality Date    GASTROSTOMY TUBE PLACEMENT N/A 10/13/2022    ESOPHAGOGASTRODUODENOSCOPY WITH  PERCUTANEOUS ENDOSCOPIC GASTROSTOMY  TUBE PLACEMENT performed by Sharif Suero MD at [place] [address]  10/13/2022    EGD BIOPSY performed by Sharif Suero MD at [place] [address].:  Family history non-contributory  No family history on file.   Social History     Tobacco Use    Smoking status: Never     Passive exposure: Never    Smokeless tobacco: Never   Vaping Use    Vaping Use: Never used   Substance Use Topics    Alcohol use: Not Currently         Allergies & Outpatient Medications   ALLERGIES:  No Known Allergies  HOME MEDICATIONS:  Current Discharge Medication List        CONTINUE these medications which have NOT CHANGED    Details   aspirin 81 MG chewable tablet Take 81 mg by mouth daily      atorvastatin (LIPITOR) 80 MG tablet Take 80 mg by mouth nightly      vitamin D (CHOLECALCIFEROL) 25 MCG (1000 UT) TABS tablet Take 1,000 Units by mouth daily      clopidogrel (PLAVIX) 75 MG tablet Take 75 mg by mouth daily      vitamin B-12 (CYANOCOBALAMIN) 1000 MCG tablet Take 1,000 mcg by mouth daily      ferrous sulfate (IRON 325) 325 (65 Fe) MG tablet Take 325 mg by mouth every other day      levETIRAcetam (KEPPRA) 500 MG tablet Take 500 mg by mouth 2 times daily      lisinopril (PRINIVIL;ZESTRIL) 40 MG tablet Take 40 mg by mouth daily      melatonin 3 MG TABS tablet Take 6 mg by mouth at bedtime      Multiple Vitamin (MULTIVITAMIN ADULT) TABS Take 1 tablet by mouth daily      mirtazapine (REMERON) 7.5 MG tablet Take 7.5 mg by mouth daily For appetite      vitamin B-1 (THIAMINE) 100 MG tablet Take 100 mg by mouth daily      verapamil (CALAN) 120 MG tablet Take 120 mg by mouth at bedtime               Physical Exam   PHYSICAL EXAM:  Vitals:    10/13/22 2200 10/14/22 0239 10/14/22 0634   BP: 133/81 125/80 (!) 142/83   Pulse: 94 97 83   Resp: 18 12 14   Temp: 97.8 °F (36.6 °C) 97.1 °F (36.2 °C) 97.5 °F (36.4 °C)   TempSrc: Tympanic Temporal Temporal   SpO2: 94% 98% 97%         General: Alert, no distress, well-nourished  Neurologic  Mental status: responses to painful stimuli, will withdraw with upper extremities, opens eye to painful stimuli, will not tract or follow across room, does blink to threat, does not follow commands. Cranial nerves:   CN 3,4,6: Pupils equal and reactive to light, extraocular muscles intact   CN7: Face symmetric    Motor Exam:  Withdraw to pain with upper extremities. Deep tendon reflexes:    R  L    Biceps  2  2   Triceps  2 2   Brachioradialis  2 2     Sensory: unable to assess do to altered mental exam  Cerebellar/coordination: unable to assess do to altered mental exam  Tone: normal   Gait: not tested      OTHER SYSTEMS:  Cardiovascular: Warm, appears well perfused   Respiratory: Easy, non-labored respiratory pattern   Abdominal: Abdomen is without distention   Extremities: Upper and lower extremities are atraumatic in appearance without deformity. No swelling or erythema. Diagnostic Testing Results   IMAGES:  Images personally reviewed and agree w/ radiology interpretation. Head CT w/o Contrast: 10/11/22      FINDINGS:   BRAIN/VENTRICLES: There is subacute infarction in the parasagittal left   frontal lobe. There are old lacunar infarcts in the central thanh, bilateral   basal ganglia and bilateral thalami. There is mild parenchymal volume loss. There is periventricular white matter low attenuation, likely related to   moderate chronic microvascular disease. There is no acute intracranial   hemorrhage, mass effect or midline shift. No abnormal extra-axial fluid   collection. The gray-white differentiation is maintained without evidence of   an acute infarct. There is no hydrocephalus. ORBITS: The visualized portion of the orbits demonstrate no acute abnormality. SINUSES: The visualized paranasal sinuses and mastoid air cells demonstrate   no acute abnormality. SOFT TISSUES/SKULL: The patient is status post right frontal craniotomy. No   acute abnormality of the visualized skull or soft tissues. Impression   Subacute infarction in the parasagittal left frontal lobe. Old lacunar infarcts in the central thanh, bilateral basal ganglia and   bilateral thalami. Mild parenchymal volume loss. Moderate chronic microvascular disease     CT scan head WO contrast: 10/6/22  FINDINGS:   BRAIN/VENTRICLES: Based upon recent MRI, there is a known acute infarct in   the left parasagittal frontal lobe in the distribution of the MARCELO. On   current CT, there is no evidence of hemorrhagic transformation. Relatively   similar pattern of edema of the frontal lobe with no sulcal effacement or   significant progression. Otherwise, there is chronic generalized atrophy   with prominence of the sulci and ventricles. Cavum septum flu syndrome noted   incidentally. No new area of edema.   No mass lesion on this noncontrast exam.       ORBITS: The trunk aneurysm, ventrally directed along the anterior wall with   a wide neck measuring 4 mm and base to apex of 2.5 mm.       OTHER: No dural venous sinus thrombosis on this non-dedicated study. BRAIN: See concurrent head CT. Impression   1. Occlusion of the proximal left A2 segment. 2. Mid basilar trunk aneurysm, ventrally directed with the base measuring 4   mm in width, 2.5 mm base to apex measurement. 3. Tortuous appearance of the bilateral distal cervical ICAs. 4. Mild-to-moderate luminal irregularity involving the PCAs. 5. Correlate with concerns for underlying arteriopathy/vasculopathy. MRI Brain w/o Contrast:  FINDINGS:   INTRACRANIAL STRUCTURES/VENTRICLES: There is a left MARCELO distribution infarct. Motion degradation challenges evaluation. There is mild corresponding T2   FLAIR hyperintensity corresponding to the left MARCELO distribution infarct. No   evidence of petechial blood products to suggest hemorrhagic transformation. Major flow voids preserved. Involutional changes. There are bilateral   chronic lacunar type infarcts identified both basal ganglia and thalamic   regions. Left greater than right pontine areas remote stroke is identified   as well. No shift of midline structure. Basal cisterns are patent. ORBITS: The visualized portion of the orbits demonstrate no acute abnormality. SINUSES: Mild-to-moderate paranasal sinus disease. BONES/SOFT TISSUES: The bone marrow signal intensity appears normal. The soft   tissues demonstrate no acute abnormality. Impression   Left MARCELO distribution infarct without hemorrhagic transformation. Mild   corresponding T2 FLAIR hyperintensity is identified. Mild to moderate chronic microvascular disease and involutional changes. Scattered areas of chronic lacunar type infarcts are identified both basal   ganglia regions and thalamic regions and left greater than right side of the   thanh. MRA Head: 10/3/22  FINDINGS:   Motion degradation challenges evaluation. ANTERIOR CIRCULATION: No significant stenosis of the intracranial internal   carotid or middle cerebral arteries. Anterior cerebral arteries not well   visualized but demonstrate no definite large vessel occlusion. Question   artifact versus prominent right ophthalmic artery or possibility of   underlying aneurysm. Consider repeat exam MR/CT angiogram when clinically   feasible. POSTERIOR CIRCULATION: No significant stenosis of the vertebral, basilar, or   posterior cerebral arteries. Impression   No definite large vessel occlusion to account for the left MARCELO infarct on the   MRI. MRA Neck 10/3/22:  FINDINGS:   AORTIC ARCH/ARCH VESSELS: No high-grade stenosis of the brachiocephalic or   subclavian arteries. CAROTID ARTERIES: No hemodynamically significant stenosis by NASCET criteria. VERTEBRAL ARTERIES: No significant stenosis. Impression   Negative for hemodynamic stenosis       LABS:  All results below personally reviewed. Pertinent positives & negatives are addressed in Impression & Recommendations below. LABS   Metabolic Panel Recent Labs     10/12/22  0531 10/13/22  0640 10/14/22  0629     --  139   K 4.3  --  4.0     --  104   CO2 25  --  27   BUN 7  --  10   CREATININE <0.5*  --  <0.5*   GLUCOSE 98  --  88   CALCIUM 8.8  --  9.1   LABALBU 2.8*  --   --    PHOS 3.5 4.0  --    MG 1.70*  --   --    ALKPHOS 56  --   --    ALT 10  --   --    AST 21  --   --       CBC / Coags Recent Labs     10/12/22  0531 10/14/22  0629   WBC 7.1 6.1   RBC 4.17 4.07   HGB 10.0* 9.7*   HCT 30.8* 30.4*    430      Other No results for input(s): LABA1C, LDLCALC, TRIG, TSH, UPOULMET87, FOLATE, LABSALI, COVID19 in the last 72 hours. No results for input(s): PHENYTOIN, KEPPRA, LACOSA, LAMO, VALPROATE, LACTSEPSIS, LACTA in the last 72 hours.        CURRENT SCHEDULED MEDICATIONS   Inpatient Medications     aspirin, 81 mg, Per G Tube, Daily    atorvastatin, 80 mg, Per G Tube, Nightly    carvedilol, 6.25 mg, Per G Tube, BID WC    clopidogrel, 75 mg, Oral, Daily    enoxaparin, 40 mg, SubCUTAneous, Daily    levETIRAcetam, 750 mg, Per G Tube, BID    melatonin, 6 mg, Per G Tube, Nightly    mirtazapine, 7.5 mg, Per G Tube, Daily    multivitamin, 1 tablet, Per G Tube, Daily    vitamin B-1, 100 mg, Per G Tube, Daily    sodium chloride flush, 5-40 mL, IntraVENous, 2 times per day    verapamil, 120 mg, Per G Tube, Nightly    vitamin B-12, 1,000 mcg, Per G Tube, Daily    Vitamin D, 1,000 Units, Per G Tube, Daily    sodium chloride flush, 5-40 mL, IntraVENous, 2 times per day   Infusions    sodium chloride        Antibiotics   Recent Abx Admin                     ceFAZolin (ANCEF) injection 1000 mg (g) 2 g Given 10/13/22 1312                       IMPRESSION & RECOMMENDATIONS     IMPRESSION:  Jovan Brewster is a 62 y.o. y/o female with PMH significant for  neurofibromatosis, developmental delay, seizure disorder on Keppra, prior stroke June 2022 (Left subcortical and L thanh), and sickle cell disease (possible lupus). She continued to show no improvement throughout her hospital stay at [facility] and was transferred to [facility] on 10/13/22 for cvEEG for definitive rule out of underlying seizure activity.      RECOMMENDATIONS:    -cvEEG  -Continue current AED regimen of Keppra 750 mg  -Can give PO via PEG  -If seizures on cvEEG, can increase keppra dose 1000 mg    Patient was noted on EEG at 14:21 to have subclinical  sz, keppra increased to 1000 mg,  if another sz noted start patient on Vimpat 150 mg BID  -Seizure Precautions   Continue home medications   Okay to start diet via PEG  Currently on dual antiplatelet therapy - she was started on DAPT per nelsy in July per  neurology with goal to continue for 90 days (stop on 10/1) then be switched to monotherapy ASA 81 mg - will plan to stop DAPT and start on monotherapy ASA. Q4 hr neuro checks     CIPRIANO Palacios CNP   Neurology & Neurocritical Care   10/14/2022 9:22 AM      ICU Patients:   Neurocritical Care Line: [phone]  [facility]: [facility] Neurocritical Care    Floor / PCU Patients:  Neurology Line: [phone]  [facility]: [facility] Neurology    Time independently spent by Nurse Practitioner reviewing chart and prior testing, obtaining history from patient, examining patient, ordering appropriate medications / diagnostics, reviewing results of diagnostics, counseling and educating patient / family, communicating plan with other providers and documenting clinical information in the EMR was approximately 30 minutes.

## 2022-10-14 NOTE — H&P
Hospital Medicine History & Physical      PCP: Melissa Zhong MD    Date of Admission: 10/13/2022    Date of Service: Pt seen/examined on 10/14/2022 and Admitted to Inpatient with expected LOS greater than two midnights due to medical therapy. Chief Complaint: Persistent altered mental status      History Of Present Illness:    62 y.o. female who presents from inpatient [facility] for neurology consultation and continuous video EEG monitoring. Patient is altered, does not wake up to verbal or painful stimuli and unable to obtain any history from her. No respiratory distress observed    History obtained from medical records from the previous hospital.  Patient has history of developmental delay, seizure disorder, prior stroke and sickle cell disease. Per report patient is verbally interactive at baseline but since 10/12 patient has not been conversant as normal.  Patient was sent to ED at [facility] from her SNF for altered mental status. At prior facility patient has been awake but was not answering any questions and has been able to make eye contact. PEG tube has placed on 10/13    Given no improvement in mental status patient was transferred here for continuous video EEG monitoring to rule out subclinical seizures. Past Medical History:    Unable to obtain from the patient due to altered mental status  No past medical history on file. Past Surgical History:    Unable to obtain from the patient due to altered mental status  No past surgical history on file. Medications Prior to Admission:      Prior to Admission medications    Medication Sig Start Date End Date Taking?  Authorizing Provider   aspirin 81 MG chewable tablet Take 81 mg by mouth daily    Historical Provider, MD   atorvastatin (LIPITOR) 80 MG tablet Take 80 mg by mouth nightly    Historical Provider, MD   vitamin D (CHOLECALCIFEROL) 25 MCG (1000 UT) TABS tablet Take 1,000 Units by mouth daily    Historical Provider, MD clopidogrel (PLAVIX) 75 MG tablet Take 75 mg by mouth daily    Historical Provider, MD   vitamin B-12 (CYANOCOBALAMIN) 1000 MCG tablet Take 1,000 mcg by mouth daily    Historical Provider, MD   ferrous sulfate (IRON 325) 325 (65 Fe) MG tablet Take 325 mg by mouth every other day    Historical Provider, MD   levETIRAcetam (KEPPRA) 500 MG tablet Take 500 mg by mouth 2 times daily    Historical Provider, MD   lisinopril (PRINIVIL;ZESTRIL) 40 MG tablet Take 40 mg by mouth daily    Historical Provider, MD   melatonin 3 MG TABS tablet Take 6 mg by mouth at bedtime    Historical Provider, MD   Multiple Vitamin (MULTIVITAMIN ADULT) TABS Take 1 tablet by mouth daily    Historical Provider, MD   mirtazapine (REMERON) 7.5 MG tablet Take 7.5 mg by mouth daily For appetite    Historical Provider, MD   vitamin B-1 (THIAMINE) 100 MG tablet Take 100 mg by mouth daily    Historical Provider, MD   verapamil (CALAN) 120 MG tablet Take 120 mg by mouth at bedtime    Historical Provider, MD       Allergies:  Patient has no known allergies. Social History:      The patient currently lives at a skilled nursing facility per record    TOBACCO:   reports that she has never smoked. She has never been exposed to tobacco smoke. She has never used smokeless tobacco.  ETOH:   reports that she does not currently use alcohol. E-cigarette/Vaping       Questions Responses    E-cigarette/Vaping Use Never User    Start Date     Passive Exposure     Quit Date     Counseling Given     Comments               Family History:    Reviewed and negative in regards to presenting illness/complaint. No family history on file. REVIEW OF SYSTEMS COMPLETED:   Unable to obtain due to altered mental status     PHYSICAL EXAM PERFORMED:    /81   Pulse 94   Temp 97.8 °F (36.6 °C) (Tympanic)   Resp 18   SpO2 94%     General appearance:  No apparent distress, appears stated age and not responding to verbal or painful stimuli.   Seem able to protect the airway  HEENT:  Normal cephalic, atraumatic without obvious deformity. Pupils equal, round, and reactive to light. Extra ocular muscles intact. Conjunctivae/corneas clear. Neck: Supple, with full range of motion. No jugular venous distention. Trachea midline. Respiratory:  Normal respiratory effort. Clear to auscultation, bilaterally without Rales/Wheezes/Rhonchi. Cardiovascular:  Regular rate and rhythm with normal S1/S2 without murmurs, rubs or gallops. Abdomen: Soft, non-tender, non-distended with normal bowel sounds. Musculoskeletal:  No clubbing, cyanosis or edema bilaterally. Full range of motion without deformity. Both legs in Unna boots  Skin: Skin color, texture, turgor normal.  No rashes or lesions. Neurologic: Unable to assess due to altered mental status and being noninteractive  Psychiatric: Unable to assess  Capillary Refill: Brisk,3 seconds, normal  Peripheral Pulses: +2 palpable, equal bilaterally       Labs:     Recent Labs     10/11/22  0518 10/12/22  0531   WBC 9.1 7.1   HGB 10.3* 10.0*   HCT 31.0* 30.8*    423     Recent Labs     10/11/22  0518 10/12/22  0531 10/13/22  0640    139  --    K 4.0 4.3  --     104  --    CO2 25 25  --    BUN 7 7  --    CREATININE <0.5* <0.5*  --    CALCIUM 8.8 8.8  --    PHOS 3.2 3.5 4.0     Recent Labs     10/11/22  0518 10/12/22  0531   AST 25 21   ALT 9* 10   BILITOT <0.2 <0.2   ALKPHOS 63 56     No results for input(s): INR in the last 72 hours. No results for input(s): Marc Pander in the last 72 hours.     Urinalysis:      Lab Results   Component Value Date/Time    NITRU Negative 10/01/2022 04:05 AM    WBCUA 15 10/01/2022 04:05 AM    BACTERIA 1+ 10/01/2022 04:05 AM    RBCUA 1 10/01/2022 04:05 AM    BLOODU Negative 10/01/2022 04:05 AM    SPECGRAV 1.037 10/01/2022 04:05 AM    GLUCOSEU Negative 10/01/2022 04:05 AM       Radiology:     CXR: I have reviewed the CXR with the following interpretation: NA  EKG:  I have reviewed the EKG with the following interpretation: NA      Consults:    IP CONSULT TO NEUROLOGY    ASSESSMENT/PLAN:    Active Hospital Problems    Diagnosis Date Noted    Acute encephalopathy [G93.40] 10/13/2022     Priority: Medium    AMS (altered mental status) [R41.82] 10/13/2022     Priority: Medium        Acute metabolic encephalopathy   Suspected subclinical seizures in a known seizure disorder patient. Initially patient has had electrolyte abnormalities, mental status has not improved with correction of electrolytes  Repeat U/A showed pyuria. -MRI Brain without contrast with left MARCELO stroke  -MRA Head and Neck with contrast with no large vessel occlusion  -Spot EEG has been abnormal  -Continued on Keppra IV   -Per family request for PEG tube has been placed on 10/13      Acute CVA   -MRI brain with left MARCELO stroke  -Continue on ASA/plavix/statin. -S/p MARLO on 10/12/22 with no cardiac thrombus  -Heme-onc consulted for suspected hypercoagulable state which has been negative, no further work-up recommended consulted  -Repeat CT head 10/11 with no new changes     Pyuria  -UTI ruled out after urine cultures showed mixed jovon  -Has received ceftriaxone, which is discontinued     Hypernatremia  -Likely secondary to severe dehydration, resolved    Hx seizure disorder  Developmental delay  Sickle cell disease      DVT Prophylaxis: Lovenox  Diet: Diet NPO, tube feeds as ordered  Code Status: Full Code    PT/OT Eval Status: Bedrest for now    Dispo -GMF with telemetry       Ed Ramírez MD    Thank you Melissa Zhong MD for the opportunity to be involved in this patient's care. If you have any questions or concerns please feel free to contact me at [phone].

## 2022-10-14 NOTE — PROGRESS NOTES
Pt is Admitted to room 5528. Pt is only responsive on painful stimuli. Vital sighs are stable . Skin assessment done by two nurses. Incontinence care provided. All standards precaution are in place. Will continue monitor . ..

## 2022-10-14 NOTE — PROGRESS NOTES
Hospitalist Progress Note      PCP: Sherren Combs, MD    Date of Admission: 10/13/2022    HPI:  62 y.o. female who presents from inpatient [facility] for neurology consultation and continuous video EEG monitoring. Patient is altered, does not wake up to verbal or painful stimuli and unable to obtain any history from her. No respiratory distress observed    History obtained from medical records from the previous hospital.  Patient has history of developmental delay, seizure disorder, prior stroke and sickle cell disease. Per report patient is verbally interactive at baseline but since 10/12 patient has not been conversant as normal.  Patient was sent to ED at [facility] from her SNF for altered mental status. At prior facility patient has been awake but was not answering any questions and has been able to make eye contact. PEG tube has placed on 10/13     Given no improvement in mental status patient was transferred here for continuous video EEG monitoring to rule out subclinical seizures. Subjective:    Pt w/ eyes open but unresponsive.     Medications:  Reviewed    Infusion Medications    sodium chloride       Scheduled Medications    aspirin  81 mg Per G Tube Daily    atorvastatin  80 mg Per G Tube Nightly    carvedilol  6.25 mg Per G Tube BID WC    clopidogrel  75 mg Oral Daily    enoxaparin  40 mg SubCUTAneous Daily    levETIRAcetam  750 mg Per G Tube BID    melatonin  6 mg Per G Tube Nightly    mirtazapine  7.5 mg Per G Tube Daily    multivitamin  1 tablet Per G Tube Daily    vitamin B-1  100 mg Per G Tube Daily    sodium chloride flush  5-40 mL IntraVENous 2 times per day    verapamil  120 mg Per G Tube Nightly    vitamin B-12  1,000 mcg Per G Tube Daily    Vitamin D  1,000 Units Per G Tube Daily    sodium chloride flush  5-40 mL IntraVENous 2 times per day     PRN Meds: labetalol, sodium chloride flush, sodium chloride, ondansetron **OR** ondansetron, polyethylene glycol, acetaminophen **OR** acetaminophen, LORazepam      Intake/Output Summary (Last 24 hours) at 10/14/2022 1139  Last data filed at 10/14/2022 0619  Gross per 24 hour   Intake --   Output 0 ml   Net 0 ml       Physical Exam Performed:    /86   Pulse 71   Temp 98 °F (36.7 °C) (Axillary)   Resp 14   SpO2 97%     General appearance: unresponsive  Neck: Supple, with full range of motion. Respiratory:  Normal respiratory effort. Clear to auscultation  Cardiovascular: Regular rate and rhythm   Abdomen: Soft, non-tender  Neurologic:  nonverbal, unresponsive    Labs:   Recent Labs     10/12/22  0531 10/14/22  0629   WBC 7.1 6.1   HGB 10.0* 9.7*   HCT 30.8* 30.4*    430     Recent Labs     10/12/22  0531 10/13/22  0640 10/14/22  0629     --  139   K 4.3  --  4.0     --  104   CO2 25  --  27   BUN 7  --  10   CREATININE <0.5*  --  <0.5*   CALCIUM 8.8  --  9.1   PHOS 3.5 4.0  --      Recent Labs     10/12/22  0531   AST 21   ALT 10   BILITOT <0.2   ALKPHOS 56     Urinalysis:      Lab Results   Component Value Date/Time    NITRU Negative 10/01/2022 04:05 AM    WBCUA 15 10/01/2022 04:05 AM    BACTERIA 1+ 10/01/2022 04:05 AM    RBCUA 1 10/01/2022 04:05 AM    BLOODU Negative 10/01/2022 04:05 AM    SPECGRAV 1.037 10/01/2022 04:05 AM    GLUCOSEU Negative 10/01/2022 04:05 AM       Assessment/Plan:    Acute metabolic encephalopathy   Suspected subclinical seizures in a known seizure disorder patient. Initially patient Pt still w/ minimal response to verbal cues. -MRI Brain without contrast with left MARCELO stroke  -MRA Head and Neck with contrast with no large vessel occlusion  -Spot EEG has been abnormal  -Continued on Keppra IV   -Per family request for PEG tube has been placed on 10/13   Await further neuro recs. Acute CVA   -MRI brain with left MARCELO stroke  -Continue on ASA/plavix/statin. -S/p MARLO on 10/12/22 with no cardiac thrombus     UTI:  Cx negative therefore rocehin discontinued.      Hypernatremia  -Likely secondary to severe dehydration- improved w/ fluids. Follow I/O and renal fnx.     DVT Prophylaxis: Lovenox  Diet: Diet NPO  Code Status: Full Code    Dispo - Inpatient      Quirino Stanley MD

## 2022-10-14 NOTE — PROGRESS NOTES
CONTINUOUS EEG    Name:  Radha [facility] Record Number:  [id number]  Age: 62 y.o. Gender: female  : 1965  Today's Date:  10/14/2022  Room:  [address] [place] [address]  Vital Signs   /86   Pulse 71   Temp 98 °F (36.7 °C) (Axillary)   Resp 14   SpO2 97%           Continuous EEG Testing Start Time:  12:24 PM    Continuous EEG Testing End Time:        Comments: Jaqueline Mercado at [facility] contacted 12:32. Impedence on all leads are within normal limits. Today is day 1 of cvEEG.     Plan of Care: Begin monitoring pt    Electronically signed by Allan Sheridan on 10/14/2022 at 2:54 PM

## 2022-10-14 NOTE — PROGRESS NOTES
Patient was picked up by EMS transport \"[facility]\" for transport to [facility] for continuous EEG monitoring. Patient had low blood glucose reading of 66 at time of EMS arrival. Writer contacted on call for order for Dextrose bolus. EMS communicated that they would transport patient with Dextrose 5 % in NS bag that was already running and bolus patient in route to [facility] if sugar was still low. This information was relayed in report to VICTORINO Eastman\" on the 5th floor at [facility] as well as other pertinent medical history. PCU bedside monitor was disconnected, leads removed and patient was medicated with 2100 meds prior to transport.

## 2022-10-14 NOTE — PROGRESS NOTES
4 Eyes Admission Assessment     I agree as the admission nurse that 2 RN's have performed a thorough Head to Toe Skin Assessment on the patient. ALL assessment sites listed below have been assessed on admission. Areas assessed by both nurses:   [x]   Head, Face, and Ears   [x]   Shoulders, Back, and Chest  [x]   Arms, Elbows, and Hands   [x]   Coccyx, Sacrum, and Ischium  [x]   Legs, Feet, and Heels        Does the Patient have Skin Breakdown?   Yes a wound was noted on the Admission Assessment and an LDA was Initiated documentation include the Carmen-wound, Wound Assessment, Measurements, Dressing Treatment, Drainage, and Color\",       Stage 2 left buttocks, stage 2 right upper thigh, scabbing to the right neck below the ear  Cedric Prevention initiated:  Yes   Wound Care Orders initiated:  Yes      Hamzah Young consulted for Pressure Injury (Stage 3,4, Unstageable, DTI, NWPT, and Complex wounds) or Cedric score 18 or lower:  Yes      Nurse 1 eSignature: Electronically signed by Arnol Hastings RN on 10/13/22 at 9:54 PM EDT    **SHARE this note so that the co-signing nurse is able to place an eSignature**    Nurse 2 eSignature: Electronically signed by Grace Bates RN on 10/13/22 at 10:07 PM EDT

## 2022-10-14 NOTE — PROGRESS NOTES
Pt not alert. Disoriented x4; not responsive unless to painful stimuli. Incontinent but voiding via purewick catheter; NPO and remains on continuous EEG monitoring. Patient had a subclinical seizure today; treated with spot dose of Keppra per Neurology team order. No outward clinical signs of seizure.

## 2022-10-14 NOTE — PROCEDURES
Continuous EEG monitoring record    Patient name: Jovan Brewster    START: 10/14/2022 @ 12:24pm  END: 10/15/2022 @ 10:00am       Electroencephalographer: Roe Garcia MD PhD      CLINICAL DETAILS:  This EEG was performed on this 62 y. o.yo female admitted for Altered mental Status and concer for subclinical seizures      TECHNICAL DETAILS:  Continuous video-EEG monitoring was performed with 27 surface scalp electrodes placed according to the International 10-20 electrode placement system, using a 32-channel [employer] headbox. All EEG and video information was acquired digitally, including the use of automated spike and seizure detection software to detect epileptiform activity. An event button was also available to be depressed during clinical events. 10/14/2022 21:00pm to 10:00am on 10/15/2022  SEIZURES:  No further seizures seen during this epoch    INTERICTAL:  Frequent focal left frontottemporal sharps. PUSHBUTTONS:  None  BACKGROUND:  Abundant generalized 6-7 Hz theta slowing of the background. Frequent focal left frontal delta slowing. EKG:  Regular    10/14/2022 12:24pm - 21:00pm   SEIZURES:   Focal Left frontotemporal subclinical seizures lasting 30-40 seconds each. Seizures occurred at 14:14, 14:21, 14:52, 15:09pm.  Team was notified. INTERICTAL:  None  PUSHBUTTONS:  None  BACKGROUND:  Abundant generalized 6-7 Hz theta slowing of the background. Frequent focal left frontal delta slowing. EKG:  regular    CLINICAL INTERPRETATION:  This was an abnormal tracing for age and state due to a mild generalized slow wave abnormality indicating diffuse cerebral dysfunction which can be of multiple causes, including structural, or vascular abnormalities, toxic/metabolic conditions, hydrocephalus, or postictal conditions. Focal slowing over the left frontotemporal region indicates a region of cerebral dysfunction that may represent an underlying structural defect.   Focal seizures occurred as described above.   Clinical correlation is advised.         Franko Oneill MD PhD

## 2022-10-15 LAB
ANION GAP SERPL CALCULATED.3IONS-SCNC: 9 MMOL/L (ref 3–16)
BUN BLDV-MCNC: 5 MG/DL (ref 7–20)
CALCIUM SERPL-MCNC: 9.1 MG/DL (ref 8.3–10.6)
CHLORIDE BLD-SCNC: 104 MMOL/L (ref 99–110)
CO2: 25 MMOL/L (ref 21–32)
CREAT SERPL-MCNC: <0.5 MG/DL (ref 0.6–1.1)
GFR AFRICAN AMERICAN: >60
GFR NON-AFRICAN AMERICAN: >60
GLUCOSE BLD-MCNC: 93 MG/DL (ref 70–99)
MAGNESIUM: 1.7 MG/DL (ref 1.8–2.4)
POTASSIUM SERPL-SCNC: 3.6 MMOL/L (ref 3.5–5.1)
SODIUM BLD-SCNC: 138 MMOL/L (ref 136–145)

## 2022-10-15 PROCEDURE — 83735 ASSAY OF MAGNESIUM: CPT

## 2022-10-15 PROCEDURE — 95813 EEG EXTND MNTR 61-119 MIN: CPT

## 2022-10-15 PROCEDURE — 2580000003 HC RX 258: Performed by: INTERNAL MEDICINE

## 2022-10-15 PROCEDURE — 2060000000 HC ICU INTERMEDIATE R&B

## 2022-10-15 PROCEDURE — 6370000000 HC RX 637 (ALT 250 FOR IP): Performed by: NURSE PRACTITIONER

## 2022-10-15 PROCEDURE — 36415 COLL VENOUS BLD VENIPUNCTURE: CPT

## 2022-10-15 PROCEDURE — 6360000002 HC RX W HCPCS: Performed by: INTERNAL MEDICINE

## 2022-10-15 PROCEDURE — 6370000000 HC RX 637 (ALT 250 FOR IP): Performed by: INTERNAL MEDICINE

## 2022-10-15 PROCEDURE — 99232 SBSQ HOSP IP/OBS MODERATE 35: CPT | Performed by: NURSE PRACTITIONER

## 2022-10-15 PROCEDURE — 80048 BASIC METABOLIC PNL TOTAL CA: CPT

## 2022-10-15 RX ORDER — MAGNESIUM SULFATE IN WATER 40 MG/ML
2000 INJECTION, SOLUTION INTRAVENOUS ONCE
Status: COMPLETED | OUTPATIENT
Start: 2022-10-15 | End: 2022-10-15

## 2022-10-15 RX ADMIN — Medication 10 ML: at 21:19

## 2022-10-15 RX ADMIN — ATORVASTATIN CALCIUM 80 MG: 80 TABLET, FILM COATED ORAL at 21:08

## 2022-10-15 RX ADMIN — Medication 1000 UNITS: at 09:01

## 2022-10-15 RX ADMIN — MAGNESIUM SULFATE HEPTAHYDRATE 2000 MG: 40 INJECTION, SOLUTION INTRAVENOUS at 13:45

## 2022-10-15 RX ADMIN — CARVEDILOL 6.25 MG: 6.25 TABLET, FILM COATED ORAL at 15:28

## 2022-10-15 RX ADMIN — LEVETIRACETAM 1000 MG: 100 SOLUTION ORAL at 22:00

## 2022-10-15 RX ADMIN — ASPIRIN 81 MG 81 MG: 81 TABLET ORAL at 09:01

## 2022-10-15 RX ADMIN — MIRTAZAPINE 7.5 MG: 15 TABLET, FILM COATED ORAL at 09:01

## 2022-10-15 RX ADMIN — Medication 6 MG: at 21:08

## 2022-10-15 RX ADMIN — VERAPAMIL HYDROCHLORIDE 120 MG: 80 TABLET ORAL at 21:08

## 2022-10-15 RX ADMIN — ENOXAPARIN SODIUM 40 MG: 100 INJECTION SUBCUTANEOUS at 09:01

## 2022-10-15 RX ADMIN — LEVETIRACETAM 1000 MG: 100 SOLUTION ORAL at 09:05

## 2022-10-15 RX ADMIN — CYANOCOBALAMIN TAB 1000 MCG 1000 MCG: 1000 TAB at 09:01

## 2022-10-15 RX ADMIN — Medication 100 MG: at 09:01

## 2022-10-15 RX ADMIN — Medication 10 ML: at 09:00

## 2022-10-15 RX ADMIN — Medication 5 ML: at 21:20

## 2022-10-15 RX ADMIN — THERA TABS 1 TABLET: TAB at 09:01

## 2022-10-15 RX ADMIN — CARVEDILOL 6.25 MG: 6.25 TABLET, FILM COATED ORAL at 09:01

## 2022-10-15 ASSESSMENT — PAIN SCALES - WONG BAKER
WONGBAKER_NUMERICALRESPONSE: 0
WONGBAKER_NUMERICALRESPONSE: 0

## 2022-10-15 NOTE — PROGRESS NOTES
NEUROLOGY / NEUROCRITICAL CARE PROGRESS NOTE       Patient Name: Belinda Harris YOB: 1965   Sex: Female Age: 62 yrs     CC / Reason for Consult: \"recent stroke, possible subclinical seizures\"    Interval Hx / Changes over last 24 hours:   Awaiting cEEG read from overnight  Watching TV this AM.   Non-interactive with examiner    ROS: unable to assess given non-verbal.     HISTORY   Admission HPI:   History on patient was obtained through chart review as patient is presently non-verbal and has altered mental status. Belinda Harris is a 62 y.o. y/o female with PMH significant for  neurofibromatosis, developmental delay, seizure disorder on Keppra, prior stroke June 2022 (Left basal ganglia and L thanh), and sickle cell disease (possible lupus). She resided in a nursing facility and was brought from that facility to the ED on 9/29/22 with increased altered mental status. Prior to this episode the patient had normally been conversive and responsive but wheelchair bound. She had been admitted to the hospital at [facility] from 9/29-10/13 for acute metabolic encephalopathy and GARRETT. While in the hospital she was not able to follow commands, she would make eye contact and withdrawal to pain. Neurology was consulted on 10/1/22. MRI / MRA of brain showed Left MARCELO distribution infarct without hemorrhagic transformation, mild corresponding T2 FLAIR hyperintensity. Mild to moderate chronic microvascular disease and involutional changes. There are scattered areas of chronic lacunar type infarcts that are identified both basal ganglia regions and thalamic regions and left greater than right side of the thanh. There were no large vessel occlusions on the MRA. Stroke was concerning to be embolic in natures. There were two abnormal routine EEGs with no recorded seizures.  She was continued on DAPT and home medication, She had a cardiac workup with MARLO on 10/12 which showed no LAMBERT thrombus, no shunt and was structurally normal. Hematology consult who reviewed her extensive hematology work up in June at [facility] with coagulation studies. The only abnormal test from their reviewed was a factor 8 level. She was noted not to have antiphos ab syndrome based on lab review. She had mildly elevated cardiolipin igm. Management of anticoagulation choice was deferred to neurology. 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 She continued to show no improve in her mental status over the course of her hospital stay at [facility]. She was transferred to [facility] on 10/13/22 for cvEEG for definitive rule out of underlying seizure activity. PMH No past medical history on file.    Allergies No Known Allergies   Diet Diet NPO  ADULT TUBE FEEDING; PEG; Standard with Fiber; Continuous; 20; Yes; 10; Q 4 hours; 40; 30; Q 4 hours   Isolation No active isolations     CURRENT SCHEDULED MEDICATIONS   Inpatient Medications     levETIRAcetam, 1,000 mg, Per G Tube, BID    aspirin, 81 mg, Per G Tube, Daily    atorvastatin, 80 mg, Per G Tube, Nightly    carvedilol, 6.25 mg, Per G Tube, BID WC    enoxaparin, 40 mg, SubCUTAneous, Daily    melatonin, 6 mg, Per G Tube, Nightly    mirtazapine, 7.5 mg, Per G Tube, Daily    multivitamin, 1 tablet, Per G Tube, Daily    vitamin B-1, 100 mg, Per G Tube, Daily    sodium chloride flush, 5-40 mL, IntraVENous, 2 times per day    verapamil, 120 mg, Per G Tube, Nightly    vitamin B-12, 1,000 mcg, Per G Tube, Daily    Vitamin D, 1,000 Units, Per G Tube, Daily    sodium chloride flush, 5-40 mL, IntraVENous, 2 times per day   Infusions    sodium chloride                LABS   Metabolic Panel Recent Labs     10/13/22  0640 10/14/22  0629 10/15/22  0802   NA  --  139 138   K  --  4.0 3.6 CL  --  104 104   CO2  --  27 25   BUN  --  10 5*   CREATININE  --  <0.5* <0.5*   GLUCOSE  --  88 93   CALCIUM  --  9.1 9.1   PHOS 4.0  --   --    MG  --   --  1.70*      CBC / Coags Recent Labs     10/14/22  0629   WBC 6.1   RBC 4.07   HGB 9.7*   HCT 30.4*               DIAGNOSTICS   IMAGES:  Images personally reviewed and agree w/ radiology interpretation. CVEEG: 10/14 12:24 - 21:00   SEIZURES:   Focal Left frontotemporal subclinical seizures lasting 30-40 seconds each. Seizures occurred at 14:14, 14:21, 14:52, 15:09pm.  Team was notified. INTERICTAL:  None  PUSHBUTTONS:  None  BACKGROUND:  Abundant generalized 6-7 Hz theta slowing of the background. Frequent focal left frontal delta slowing. EKG:  regular     CLINICAL INTERPRETATION:  This was an abnormal tracing for age and state due to a mild generalized slow wave abnormality indicating diffuse cerebral dysfunction which can be of multiple causes, including structural, or vascular abnormalities, toxic/metabolic conditions, hydrocephalus, or postictal conditions. Focal slowing over the left frontotemporal region indicates a region of cerebral dysfunction that may represent an underlying structural defect. Focal seizures occurred as described above. Clinical correlation is advised. PHYSICAL EXAMINATION     PHYSICAL EXAM:  Vitals:    10/14/22 2249 10/15/22 0257 10/15/22 0638 10/15/22 0740   BP: (!) 142/82 (!) 155/97 136/88    Pulse: 90 92 85    Resp: 16 18 20    Temp: 97.7 °F (36.5 °C) 97.1 °F (36.2 °C) 97.5 °F (36.4 °C)    TempSrc: Temporal Temporal Temporal    SpO2: 94% 96% 97%    Height:    5' (1.524 m)       Exam  -Mental status: eyes open to loud voice.  Does not answer orientation question pleasant & appropriate  -Speech & Language: non-verbal. Not following any commands   -Cranial nerves: pupils symmetric; no notable dysconjugate gaze; eyes midline; no facial asymmetry  -Motor: not moving all four limbs  -Tone: increased on the left; normal on the right   -Other: no adventitious movements noted  Other Systems  -General Appearance: well-developed, well-nourished, no apparent distress  -Neck: supple  -Lungs: breathing unlabored, regular, no audible wheezes  -CV: pulses strong x4 extremities  -Abd: flat    ASSESSMENT & RECOMMENDATIONS   Assessment:  Sari Urbina is a 62 y.o. female who presented with stroke and decreased responsiveness. Abnormal EEG at [facility] prompting transfer for cEEG. She has had several subclinical seizures but awaiting EEG report from overnight. She has had workup for stroke including MARLO. Recommendations:  - Continue cEEG  - Continue Keppra to 1000mg bid. - Further increase in Keppra or addition of another AED like Vimpat if subclinical seizures persist.  - ASA for secondary stroke prevention.  - PT/OT/SLP when able. CIPRIANO Willis - CNP   Neurology & Neurocritical Care   10/15/2022 9:28 AM    Floor / PCU Patients:  Neurology Line: [phone]  [facility]: [facility]    I spent 25 minutes in the care of this patient. Over 50% of that time was in face-to-face counseling regarding disease process, diagnostic testing, preventative measures, and answering patient and family questions.

## 2022-10-15 NOTE — PROGRESS NOTES
Patient alert, but unable to follow commands. Tube feed started at 20ml per/hour with Q4H 30 ml flush. Patient tolerating at this time.

## 2022-10-15 NOTE — PROGRESS NOTES
Patient bathed this am. Pulls away with cleaning. Eye opening to speech. Patient pulling away more with LLE when touching during bath. Patient moving LUE without assistance. No verbal response.

## 2022-10-15 NOTE — CONSULTS
Comprehensive Nutrition Assessment    Type and Reason for Visit:  Initial, Consult    Nutrition Recommendations/Plan:   Initiate Jevity 1.5 (standard w/ fiber formula) at 20 mL/hr and as tolerated, increase by 10 mL/hr q 4 hours until goal of 40 mL/hr. Recommend 30 mL H20 q 4 hours. Recommend reevaluate IV fluids at this time. Increase flush if Na+ increases greater than 145 mEq/L. Ensure head of bed is 30 - 45 degrees during continuous or bolus gastric feeding and for one hour after bolus. Turn off the feeding if head of bed is lowered less than 30 degrees. Monitor for tolerance (bowel habits, N/V, cramping, abdominal exam findings: distended, firm, tense, guarded, discomfort). Irrigate tube with 30 mL water before, between and after each medication. Closed System Guidelines:  Change tubing and feeding container every 24 hours. Monitor nutrition adequacy, pertinent labs, bowel habits, wt changes, and clinical progress     Malnutrition Assessment:  Malnutrition Status: At risk for malnutrition (Comment) (10/15/22 2701)    Context:  Chronic Illness     Findings of the 6 clinical characteristics of malnutrition:  Energy Intake:  75% or less estimated energy requirements for 1 month or longer  Weight Loss:  No significant weight loss     Fluid Accumulation:  Mild      Nutrition Assessment:    Consult for TF ordering and management: Pt had PEG placed 10/13, currently NPO. Transferred from [facility] where pt had been receiving Jevity 1.5 @ 45 mL/hr. Will continue Jevity 1.5 and modify rate to 40 mL/hr. No significant wt loss noted in EMR, pt previously w/out adequate nutrition prior to PEG placement for >1 month period. Pt not appropriate to interview at this time, will continue to monitor for TF tolerance. Nutrition Related Findings:    Hypoactive bowel sounds. +BM 10/14. Non-pitting trace edema.  Wound Type: None       Current Nutrition Intake & Therapies:    Average Meal Intake: NPO  Average Supplements Intake: NPO  Diet NPO  Current Tube Feeding (TF) Orders:  Feeding Route: PEG  Formula: Standard with Fiber  Schedule: Continuous  Additives/Modulars: None  Goal TF & Flush Orders Provides: Jevity 1.5 @ 40 mL/hr to provide: 960 mL TV, 1440 kcal, 61 g/pro, and 730 mL FW + FW flushes of 30 mL q4    Anthropometric Measures:  Height: 5' (152.4 cm)  Ideal Body Weight (IBW): 100 lbs (45 kg)       Current Body Weight: 143 lb 15.4 oz (65.3 kg), 144 % IBW. Weight Source: Bed Scale  Current BMI (kg/m2): 28.1        Weight Adjustment For: No Adjustment                 BMI Categories: Overweight (BMI 25.0-29. 9)    Estimated Daily Nutrient Needs:  Energy Requirements Based On: Kcal/kg (20-25 kcal/kg CBW)  Weight Used for Energy Requirements: Current (CBW 65.3 kg)  Energy (kcal/day): 3563-4690  Weight Used for Protein Requirements: Ideal (1.3-1.5 g/kg IBW (45.45kg))  Protein (g/day): 60-68  Method Used for Fluid Requirements: 1 ml/kcal  Fluid (ml/day): or per MD    Nutrition Diagnosis:   Inadequate oral intake related to cognitive or neurological impairment as evidenced by NPO or clear liquid status due to medical condition, nutrition support - enteral nutrition    Nutrition Interventions:   Food and/or Nutrient Delivery: Start Tube Feeding, Continue NPO  Nutrition Education/Counseling: Education not appropriate  Coordination of Nutrition Care: Continue to monitor while inpatient       Goals:  Previous Goal Met: Progressing toward Goal(s)  Goals: Initiate nutrition support, within 2 days       Nutrition Monitoring and Evaluation:   Behavioral-Environmental Outcomes: None Identified  Food/Nutrient Intake Outcomes: Enteral Nutrition Intake/Tolerance  Physical Signs/Symptoms Outcomes: Biochemical Data, Nutrition Focused Physical Findings, Weight, GI Status    Discharge Planning:     Too soon to determine     Tim Grewal, [address],   Contact: [phone]

## 2022-10-15 NOTE — PROGRESS NOTES
Hospitalist Progress Note      PCP: Hughes Galeazzi, MD    Date of Admission: 10/13/2022    HPI:  62 y.o. female who presents from inpatient [facility] for neurology consultation and continuous video EEG monitoring. Patient is altered, does not wake up to verbal or painful stimuli and unable to obtain any history from her. No respiratory distress observed    History obtained from medical records from the previous hospital.  Patient has history of developmental delay, seizure disorder, prior stroke and sickle cell disease. Per report patient is verbally interactive at baseline but since 10/12 patient has not been conversant as normal.  Patient was sent to ED at [facility] from her SNF for altered mental status. At prior facility patient has been awake but was not answering any questions and has been able to make eye contact. PEG tube has placed on 10/13     Given no improvement in mental status patient was transferred here for continuous video EEG monitoring to rule out subclinical seizures. Subjective:    Eyes closed, unresponsive. On cEEG monitoring. Subclinical seizures recorded overnight.     Medications:  Reviewed    Infusion Medications    sodium chloride       Scheduled Medications    magnesium sulfate  2,000 mg IntraVENous Once    levETIRAcetam  1,000 mg Per G Tube BID    aspirin  81 mg Per G Tube Daily    atorvastatin  80 mg Per G Tube Nightly    carvedilol  6.25 mg Per G Tube BID WC    enoxaparin  40 mg SubCUTAneous Daily    melatonin  6 mg Per G Tube Nightly    mirtazapine  7.5 mg Per G Tube Daily    multivitamin  1 tablet Per G Tube Daily    vitamin B-1  100 mg Per G Tube Daily    sodium chloride flush  5-40 mL IntraVENous 2 times per day    verapamil  120 mg Per G Tube Nightly    vitamin B-12  1,000 mcg Per G Tube Daily    Vitamin D  1,000 Units Per G Tube Daily    sodium chloride flush  5-40 mL IntraVENous 2 times per day     PRN Meds: labetalol, sodium chloride flush, sodium chloride, ondansetron **OR** ondansetron, polyethylene glycol, acetaminophen **OR** acetaminophen, LORazepam      Intake/Output Summary (Last 24 hours) at 10/15/2022 1313  Last data filed at 10/15/2022 0600  Gross per 24 hour   Intake 100 ml   Output 1100 ml   Net -1000 ml       Physical Exam Performed:    BP (!) 152/86   Pulse 83   Temp 98.2 °F (36.8 °C) (Axillary)   Resp 18   Ht 5' (1.524 m)   SpO2 95%   BMI 28.12 kg/m²     General appearance: asleep, unresponsive  Respiratory:  Normal respiratory effort. Clear to auscultation  Cardiovascular: Regular rate and rhythm   Abdomen: Soft, non-tender  Neurologic:  nonverbal, unresponsive    Labs:   Recent Labs     10/14/22  0629   WBC 6.1   HGB 9.7*   HCT 30.4*        Recent Labs     10/13/22  0640 10/14/22  0629 10/15/22  0802   NA  --  139 138   K  --  4.0 3.6   CL  --  104 104   CO2  --  27 25   BUN  --  10 5*   CREATININE  --  <0.5* <0.5*   CALCIUM  --  9.1 9.1   PHOS 4.0  --   --      No results for input(s): AST, ALT, BILIDIR, BILITOT, ALKPHOS in the last 72 hours. Urinalysis:      Lab Results   Component Value Date/Time    NITRU Negative 10/01/2022 04:05 AM    WBCUA 15 10/01/2022 04:05 AM    BACTERIA 1+ 10/01/2022 04:05 AM    RBCUA 1 10/01/2022 04:05 AM    BLOODU Negative 10/01/2022 04:05 AM    SPECGRAV 1.037 10/01/2022 04:05 AM    GLUCOSEU Negative 10/01/2022 04:05 AM       Assessment/Plan:    Acute metabolic encephalopathy   Suspected subclinical seizures in a known seizure disorder patient. Initially patient Pt still w/ minimal response to verbal cues. -MRI Brain without contrast with left MARCELO stroke  -MRA Head and Neck with contrast with no large vessel occlusion  Subclinical seziures on cEEG overnight- neuro recs noted and appreciated. Keppra dosage adjusted per neuro. Cont cEEG. Acute CVA   -MRI brain with left MARCELO stroke  -Continue on ASA/plavix/statin.    -S/p MARLO on 10/12/22 with no cardiac thrombus     UTI:  Cx negative therefore rocephin was discontinued. Hypernatremia  -Likely secondary to severe dehydration- improved w/ fluids. Follow I/O and renal fnx. Hypomagnesemia:  Replace IV and follow.     DVT Prophylaxis: Lovenox  Diet: Diet NPO  ADULT TUBE FEEDING; PEG; Standard with Fiber; Continuous; 20; Yes; 10; Q 4 hours; 40; 30; Q 4 hours  Code Status: Full Giovany Barnett MD

## 2022-10-15 NOTE — PROGRESS NOTES
CONTINUOUS EEG    Name:  [facility] Record Number:  [id number]  Age: 62 y.o. Gender: female  : 1965  Today's Date:  10/15/2022  Room:  6557/7727-07  Vital Signs   BP (!) 142/90   Pulse 87   Temp 98.7 °F (37.1 °C) (Axillary)   Resp 16   Ht 5' (1.524 m)   SpO2 94%   BMI 28.12 kg/m²       Patient currently on continuous EEG monitoring. All EEG leads are currently in place with no current issues. Verified [employer] connection via team viewer. Checked in with patient RN for current plan of care. Comments: Continue monitoring. All leads within 10K limit. Today is day 2 of cvEEG.     Electronically signed by Yelena Joseph on 10/15/2022 at 6:43 PM

## 2022-10-15 NOTE — PLAN OF CARE
Problem: Safety - Medical Restraint  Goal: Remains free of injury from restraints (Restraint for Interference with Medical Device)  Description: INTERVENTIONS:  1. Determine that other, less restrictive measures have been tried or would not be effective before applying the restraint  2. Evaluate the patient's condition at the time of restraint application  3. Inform patient/family regarding the reason for restraint  4. Q2H: Monitor safety, psychosocial status, comfort, nutrition and hydration  10/15/2022 0938 by Hiram Ho RN  Outcome: Progressing     Problem: Discharge Planning  Goal: Discharge to home or other facility with appropriate resources  10/15/2022 0938 by Hiram Ho RN  Outcome: Progressing     Problem: Skin/Tissue Integrity  Goal: Absence of new skin breakdown  Description: 1. Monitor for areas of redness and/or skin breakdown  2. Assess vascular access sites hourly  3. Every 4-6 hours minimum:  Change oxygen saturation probe site  4. Every 4-6 hours:  If on nasal continuous positive airway pressure, respiratory therapy assess nares and determine need for appliance change or resting period.   10/15/2022 0938 by Hiram Ho RN  Outcome: Progressing     Problem: Safety - Adult  Goal: Free from fall injury  10/15/2022 0938 by Hiram Ho RN  Outcome: Progressing     Problem: ABCDS Injury Assessment  Goal: Absence of physical injury  10/15/2022 0938 by Hiram Ho RN  Outcome: Progressing     Problem: Pain  Goal: Verbalizes/displays adequate comfort level or baseline comfort level  10/15/2022 0938 by Hiram Ho RN  Outcome: Progressing     Problem: Nutrition Deficit:  Goal: Optimize nutritional status  10/15/2022 0938 by Hiram Ho RN  Outcome: Progressing

## 2022-10-15 NOTE — PROGRESS NOTES
Pt is nonverbal and disoriented x 4. Pt opens eyes occasionally but unresponsive. Voiding adequately via pure wick. pt is in EEG continuous monitor. All  standard precautions are in place. Will continue monitor . Daphne Press ...

## 2022-10-16 LAB
ANION GAP SERPL CALCULATED.3IONS-SCNC: 10 MMOL/L (ref 3–16)
BUN BLDV-MCNC: 10 MG/DL (ref 7–20)
CALCIUM SERPL-MCNC: 9 MG/DL (ref 8.3–10.6)
CHLORIDE BLD-SCNC: 105 MMOL/L (ref 99–110)
CO2: 23 MMOL/L (ref 21–32)
CREAT SERPL-MCNC: <0.5 MG/DL (ref 0.6–1.1)
GFR AFRICAN AMERICAN: >60
GFR NON-AFRICAN AMERICAN: >60
GLUCOSE BLD-MCNC: 117 MG/DL (ref 70–99)
HCT VFR BLD CALC: 30.5 % (ref 36–48)
HEMOGLOBIN: 9.6 G/DL (ref 12–16)
MAGNESIUM: 1.8 MG/DL (ref 1.8–2.4)
MCH RBC QN AUTO: 23.5 PG (ref 26–34)
MCHC RBC AUTO-ENTMCNC: 31.6 G/DL (ref 31–36)
MCV RBC AUTO: 74.5 FL (ref 80–100)
PDW BLD-RTO: 20 % (ref 12.4–15.4)
PLATELET # BLD: 108 K/UL (ref 135–450)
PMV BLD AUTO: 8.8 FL (ref 5–10.5)
POTASSIUM SERPL-SCNC: 3.8 MMOL/L (ref 3.5–5.1)
RBC # BLD: 4.1 M/UL (ref 4–5.2)
SODIUM BLD-SCNC: 138 MMOL/L (ref 136–145)
WBC # BLD: 5.1 K/UL (ref 4–11)

## 2022-10-16 PROCEDURE — 85027 COMPLETE CBC AUTOMATED: CPT

## 2022-10-16 PROCEDURE — 2580000003 HC RX 258: Performed by: INTERNAL MEDICINE

## 2022-10-16 PROCEDURE — 2060000000 HC ICU INTERMEDIATE R&B

## 2022-10-16 PROCEDURE — 83735 ASSAY OF MAGNESIUM: CPT

## 2022-10-16 PROCEDURE — 6370000000 HC RX 637 (ALT 250 FOR IP): Performed by: NURSE PRACTITIONER

## 2022-10-16 PROCEDURE — 80048 BASIC METABOLIC PNL TOTAL CA: CPT

## 2022-10-16 PROCEDURE — 6370000000 HC RX 637 (ALT 250 FOR IP): Performed by: INTERNAL MEDICINE

## 2022-10-16 PROCEDURE — 6360000002 HC RX W HCPCS: Performed by: INTERNAL MEDICINE

## 2022-10-16 PROCEDURE — 99232 SBSQ HOSP IP/OBS MODERATE 35: CPT | Performed by: NURSE PRACTITIONER

## 2022-10-16 PROCEDURE — 36415 COLL VENOUS BLD VENIPUNCTURE: CPT

## 2022-10-16 RX ADMIN — Medication 6 MG: at 20:55

## 2022-10-16 RX ADMIN — Medication 10 ML: at 20:56

## 2022-10-16 RX ADMIN — ATORVASTATIN CALCIUM 80 MG: 80 TABLET, FILM COATED ORAL at 20:56

## 2022-10-16 RX ADMIN — VERAPAMIL HYDROCHLORIDE 120 MG: 80 TABLET ORAL at 20:56

## 2022-10-16 RX ADMIN — THERA TABS 1 TABLET: TAB at 09:06

## 2022-10-16 RX ADMIN — MIRTAZAPINE 7.5 MG: 15 TABLET, FILM COATED ORAL at 09:06

## 2022-10-16 RX ADMIN — ENOXAPARIN SODIUM 40 MG: 100 INJECTION SUBCUTANEOUS at 09:05

## 2022-10-16 RX ADMIN — CARVEDILOL 6.25 MG: 6.25 TABLET, FILM COATED ORAL at 16:51

## 2022-10-16 RX ADMIN — Medication 10 ML: at 09:06

## 2022-10-16 RX ADMIN — Medication 10 ML: at 20:57

## 2022-10-16 RX ADMIN — CARVEDILOL 6.25 MG: 6.25 TABLET, FILM COATED ORAL at 09:05

## 2022-10-16 RX ADMIN — LEVETIRACETAM 1000 MG: 100 SOLUTION ORAL at 09:05

## 2022-10-16 RX ADMIN — CYANOCOBALAMIN TAB 1000 MCG 1000 MCG: 1000 TAB at 09:05

## 2022-10-16 RX ADMIN — LEVETIRACETAM 1000 MG: 100 SOLUTION ORAL at 20:56

## 2022-10-16 RX ADMIN — Medication 100 MG: at 09:06

## 2022-10-16 RX ADMIN — ASPIRIN 81 MG 81 MG: 81 TABLET ORAL at 09:06

## 2022-10-16 RX ADMIN — Medication 1000 UNITS: at 09:06

## 2022-10-16 RX ADMIN — Medication 10 ML: at 09:07

## 2022-10-16 ASSESSMENT — PAIN SCALES - WONG BAKER
WONGBAKER_NUMERICALRESPONSE: 0
WONGBAKER_NUMERICALRESPONSE: 0

## 2022-10-16 NOTE — PLAN OF CARE
Problem: Safety - Medical Restraint  Goal: Remains free of injury from restraints (Restraint for Interference with Medical Device)  Description: INTERVENTIONS:  1. Determine that other, less restrictive measures have been tried or would not be effective before applying the restraint  2. Evaluate the patient's condition at the time of restraint application  3. Inform patient/family regarding the reason for restraint  4. Q2H: Monitor safety, psychosocial status, comfort, nutrition and hydration  10/16/2022 1937 by Mary Browning RN  Outcome: Progressing  10/16/2022 0953 by Arianna Griffiths RN  Outcome: Progressing     Problem: Discharge Planning  Goal: Discharge to home or other facility with appropriate resources  10/16/2022 1937 by Mary Browning RN  Outcome: Progressing  10/16/2022 0953 by Arianna Griffiths RN  Outcome: Progressing     Problem: Skin/Tissue Integrity  Goal: Absence of new skin breakdown  Description: 1. Monitor for areas of redness and/or skin breakdown  2. Assess vascular access sites hourly  3. Every 4-6 hours minimum:  Change oxygen saturation probe site  4. Every 4-6 hours:  If on nasal continuous positive airway pressure, respiratory therapy assess nares and determine need for appliance change or resting period.   10/16/2022 1937 by Mary Browning RN  Outcome: Progressing  10/16/2022 0953 by Arianna Griffiths RN  Outcome: Progressing     Problem: Safety - Adult  Goal: Free from fall injury  10/16/2022 1937 by Mary Browning RN  Outcome: Progressing  10/16/2022 0953 by Arianna Griffiths RN  Outcome: Progressing     Problem: ABCDS Injury Assessment  Goal: Absence of physical injury  10/16/2022 1937 by Mary Browning RN  Outcome: Progressing  10/16/2022 0953 by Arianna Griffiths RN  Outcome: Progressing     Problem: Pain  Goal: Verbalizes/displays adequate comfort level or baseline comfort level  10/16/2022 1937 by Mary Browning RN  Outcome: Progressing  10/16/2022 5437 by Linnea Simmons, RN  Outcome: Progressing     Problem: Nutrition Deficit:  Goal: Optimize nutritional status  10/16/2022 1937 by Arianne Sharpe RN  Outcome: Progressing  10/16/2022 0953 by Linnea Simmons, RN  Outcome: Progressing

## 2022-10-16 NOTE — PLAN OF CARE
Problem: Skin/Tissue Integrity  Goal: Absence of new skin breakdown  Description: 1. Monitor for areas of redness and/or skin breakdown  2. Assess vascular access sites hourly  3. Every 4-6 hours minimum:  Change oxygen saturation probe site  4. Every 4-6 hours:  If on nasal continuous positive airway pressure, respiratory therapy assess nares and determine need for appliance change or resting period.   Outcome: Progressing  Note: No new skin breakdown noted

## 2022-10-16 NOTE — PLAN OF CARE
Problem: Safety - Medical Restraint  Goal: Remains free of injury from restraints (Restraint for Interference with Medical Device)  Description: INTERVENTIONS:  1. Determine that other, less restrictive measures have been tried or would not be effective before applying the restraint  2. Evaluate the patient's condition at the time of restraint application  3. Inform patient/family regarding the reason for restraint  4. Q2H: Monitor safety, psychosocial status, comfort, nutrition and hydration  Outcome: Progressing     Problem: Discharge Planning  Goal: Discharge to home or other facility with appropriate resources  Outcome: Progressing     Problem: Skin/Tissue Integrity  Goal: Absence of new skin breakdown  Description: 1. Monitor for areas of redness and/or skin breakdown  2. Assess vascular access sites hourly  3. Every 4-6 hours minimum:  Change oxygen saturation probe site  4. Every 4-6 hours:  If on nasal continuous positive airway pressure, respiratory therapy assess nares and determine need for appliance change or resting period.   10/16/2022 0953 by Leatha Díaz RN  Outcome: Progressing     Problem: Safety - Adult  Goal: Free from fall injury  Outcome: Progressing     Problem: ABCDS Injury Assessment  Goal: Absence of physical injury  Outcome: Progressing     Problem: Pain  Goal: Verbalizes/displays adequate comfort level or baseline comfort level  Outcome: Progressing     Problem: Nutrition Deficit:  Goal: Optimize nutritional status  Outcome: Progressing

## 2022-10-16 NOTE — PROGRESS NOTES
NEUROLOGY / NEUROCRITICAL CARE PROGRESS NOTE       Patient Name: Ángel Mdcaniels YOB: 1965   Sex: Female Age: 62 yrs     CC / Reason for Consult: \"recent stroke, possible subclinical seizures\"    Interval Hx / Changes over last 24 hours:   No major changes per nursing staff. Watching TV this AM.   Remains non-interactive with examiner  NO seizures since 10/14 15:09    ROS: unable to assess given non-verbal.     HISTORY   Admission HPI:   History on patient was obtained through chart review as patient is presently non-verbal and has altered mental status. Ángel Mcdaniels is a 62 y.o. y/o female with PMH significant for  neurofibromatosis, developmental delay, seizure disorder on Keppra, prior stroke June 2022 (Left basal ganglia and L thanh), and sickle cell disease (possible lupus). She resided in a nursing facility and was brought from that facility to the ED on 9/29/22 with increased altered mental status. Prior to this episode the patient had normally been conversive and responsive but wheelchair bound. She had been admitted to the hospital at [facility] from 9/29-10/13 for acute metabolic encephalopathy and GARRETT. While in the hospital she was not able to follow commands, she would make eye contact and withdrawal to pain. Neurology was consulted on 10/1/22. MRI / MRA of brain showed Left MARCELO distribution infarct without hemorrhagic transformation, mild corresponding T2 FLAIR hyperintensity. Mild to moderate chronic microvascular disease and involutional changes. There are scattered areas of chronic lacunar type infarcts that are identified both basal ganglia regions and thalamic regions and left greater than right side of the thanh. There were no large vessel occlusions on the MRA. Stroke was concerning to be embolic in natures. There were two abnormal routine EEGs with no recorded seizures.  She was continued on DAPT and home medication, She had a cardiac workup with MARLO on 10/12 which showed no LAMBERT thrombus, no shunt and was structurally normal.     Hematology consult who reviewed her extensive hematology work up in June at [facility] with coagulation studies. The only abnormal test from their reviewed was a factor 8 level. She was noted not to have antiphos ab syndrome based on lab review. She had mildly elevated cardiolipin igm. Management of anticoagulation choice was deferred to neurology. 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 She continued to show no improve in her mental status over the course of her hospital stay at [facility]. She was transferred to [facility] on 10/13/22 for cvEEG for definitive rule out of underlying seizure activity. PMH No past medical history on file.    Allergies No Known Allergies   Diet Diet NPO  ADULT TUBE FEEDING; PEG; Standard with Fiber; Continuous; 20; Yes; 10; Q 4 hours; 40; 30; Q 4 hours   Isolation No active isolations     CURRENT SCHEDULED MEDICATIONS   Inpatient Medications     levETIRAcetam, 1,000 mg, Per G Tube, BID    aspirin, 81 mg, Per G Tube, Daily    atorvastatin, 80 mg, Per G Tube, Nightly    carvedilol, 6.25 mg, Per G Tube, BID WC    enoxaparin, 40 mg, SubCUTAneous, Daily    melatonin, 6 mg, Per G Tube, Nightly    mirtazapine, 7.5 mg, Per G Tube, Daily    multivitamin, 1 tablet, Per G Tube, Daily    vitamin B-1, 100 mg, Per G Tube, Daily    sodium chloride flush, 5-40 mL, IntraVENous, 2 times per day    verapamil, 120 mg, Per G Tube, Nightly    vitamin B-12, 1,000 mcg, Per G Tube, Daily    Vitamin D, 1,000 Units, Per G Tube, Daily    sodium chloride flush, 5-40 mL, IntraVENous, 2 times per day   Infusions    sodium chloride                LABS   Metabolic Panel Recent Labs     10/14/22  0629 10/15/22  0802 10/16/22  0608    138 138   K 4.0 3.6 3.8    104 105   CO2 27 25 23   BUN 10 5* 10   CREATININE <0.5* <0.5* <0.5*   GLUCOSE 88 93 117*   CALCIUM 9.1 9.1 9.0   MG  --  1.70* 1.80        CBC / Coags Recent Labs     10/14/22  0629 10/16/22  0608   WBC 6.1 5.1   RBC 4.07 4.10   HGB 9.7* 9.6*   HCT 30.4* 30.5*    108*              DIAGNOSTICS   IMAGES:  Images personally reviewed and agree w/ radiology interpretation. CVEEG:   10/15/2022  10:00am to 22:00pm   SEIZURES:  None  INTERICTAL:  Frequent focal left frontottemporal sharps. PUSHBUTTONS:  None  BACKGROUND:  Abundant generalized 6-7 Hz theta slowing of the background. Frequent focal left frontal delta slowing. EKG:  Regular    10/14/2022 21:00pm to 10:00am on 10/15/2022  SEIZURES:  No further seizures seen during this epoch    INTERICTAL:  Frequent focal left frontottemporal sharps. PUSHBUTTONS:  None  BACKGROUND:  Abundant generalized 6-7 Hz theta slowing of the background. Frequent focal left frontal delta slowing. EKG:  Regular    CVEEG 10/14/2022 12:24pm - 21:00pm   Seizures   Focal Left frontotemporal subclinical seizures lasting 30-40 seconds each. Seizures occurred at 14:14, 14:21, 14:52, 15:09pm.  Team was notified. INTERICTAL:  None  PUSHBUTTONS:  None  BACKGROUND:  Abundant generalized 6-7 Hz theta slowing of the background. Frequent focal left frontal delta slowing. EKG:  regular     CLINICAL INTERPRETATION:  This was an abnormal tracing for age and state due to a mild generalized slow wave abnormality indicating diffuse cerebral dysfunction which can be of multiple causes, including structural, or vascular abnormalities, toxic/metabolic conditions, hydrocephalus, or postictal conditions. Focal slowing over the left frontotemporal region indicates a region of cerebral dysfunction that may represent an underlying structural defect. Focal seizures occurred as described above. Clinical correlation is advised. PHYSICAL EXAMINATION     PHYSICAL EXAM:  Vitals:    10/15/22 2108 10/15/22 2308 10/16/22 0000 10/16/22 0300   BP: (!) 142/90 119/80 119/80 110/69   Pulse:  92 92 85   Resp:  16 16 16   Temp:  99 °F (37.2 °C) 99 °F (37.2 °C) 98.7 °F (37.1 °C)   TempSrc:  Oral Oral Oral   SpO2:  96% 96% 97%   Height:           Exam  -Mental status: eyes open spontaneously. Does not answer orientation questions  -Speech & Language: non-verbal. Not following any commands   -Cranial nerves: blinks to threat; pupils symmetric; no notable dysconjugate gaze; eyes midline; no facial asymmetry  -Motor: not moving all four limbs  -Tone: increased on the left; normal on the right   -Other: no adventitious movements noted  Other Systems  -General Appearance: well-developed, well-nourished, no apparent distress  -Neck: supple  -Lungs: breathing unlabored, regular, no audible wheezes  -CV: pulses strong x4 extremities  -Abd: flat    ASSESSMENT & RECOMMENDATIONS   Assessment:  Belinda Harris is a 62 y.o. female with a history of recurrent ischemic stroke, developmental delay (w/c bound at baseline), seizures, sickle cell disease, and neurofibromatoses who presented with MARCELO stroke and decreased responsiveness. Abnormal EEG at [facility] prompting transfer for cEEG. No seizures x 36 hours (last seizure was 10/14 15:09). She has had workup for stroke including MARLO. Recommendations:  - Discontinue cEEG  - Continue Keppra to 1000mg bid. - ASA and statin for secondary stroke prevention.  - PT/OT/SLP when able. - F/u with neurology as outpatient     No further inpatient neurologic work up. OK to d/c when otherwise ready. Please call with questions, concerns, or change in exam.     CIPRIANO Caballero - CNP   Neurology & Neurocritical Care   10/16/2022 9:19 AM    Floor / PCU Patients:  Neurology Line: [phone]  [facility]: [facility] Neurology    I spent 25 minutes in the care of this patient.   Over 50% of that time was in face-to-face counseling regarding disease process, diagnostic testing, preventative measures, and answering patient and family questions.

## 2022-10-16 NOTE — PROGRESS NOTES
Hospitalist Progress Note      PCP: Ramez Harman MD    Date of Admission: 10/13/2022    HPI:  62 y.o. female who presents from inpatient [facility] for neurology consultation and continuous video EEG monitoring. Patient is altered, does not wake up to verbal or painful stimuli and unable to obtain any history from her. No respiratory distress observed    History obtained from medical records from the previous hospital.  Patient has history of developmental delay, seizure disorder, prior stroke and sickle cell disease. Per report patient is verbally interactive at baseline but since 10/12 patient has not been conversant as normal.  Patient was sent to ED at [facility] from her SNF for altered mental status. At prior facility patient has been awake but was not answering any questions and has been able to make eye contact. PEG tube has placed on 10/13     Given no improvement in mental status patient was transferred here for continuous video EEG monitoring to rule out subclinical seizures. Subjective:    Unresponsive.     Medications:  Reviewed    Infusion Medications    sodium chloride       Scheduled Medications    levETIRAcetam  1,000 mg Per G Tube BID    aspirin  81 mg Per G Tube Daily    atorvastatin  80 mg Per G Tube Nightly    carvedilol  6.25 mg Per G Tube BID WC    enoxaparin  40 mg SubCUTAneous Daily    melatonin  6 mg Per G Tube Nightly    mirtazapine  7.5 mg Per G Tube Daily    multivitamin  1 tablet Per G Tube Daily    vitamin B-1  100 mg Per G Tube Daily    sodium chloride flush  5-40 mL IntraVENous 2 times per day    verapamil  120 mg Per G Tube Nightly    vitamin B-12  1,000 mcg Per G Tube Daily    Vitamin D  1,000 Units Per G Tube Daily    sodium chloride flush  5-40 mL IntraVENous 2 times per day     PRN Meds: labetalol, sodium chloride flush, sodium chloride, ondansetron **OR** ondansetron, polyethylene glycol, acetaminophen **OR** acetaminophen, LORazepam      Intake/Output Summary (Last 24 hours) at 10/16/2022 0903  Last data filed at 10/16/2022 0604  Gross per 24 hour   Intake 608 ml   Output 1200 ml   Net -592 ml       Physical Exam Performed:    /69   Pulse 85   Temp 98.7 °F (37.1 °C) (Oral)   Resp 16   Ht 5' (1.524 m)   SpO2 97%   BMI 28.12 kg/m²     General appearance: appears to be asleep, unresponsive  Respiratory:  Normal respiratory effort. Clear to auscultation  Cardiovascular: Regular rate and rhythm   Abdomen: Soft, non-tender  Neurologic:  unable to complete neuro exam due to unresponsiveness    Labs:   Recent Labs     10/14/22  0629 10/16/22  0608   WBC 6.1 5.1   HGB 9.7* 9.6*   HCT 30.4* 30.5*    108*     Recent Labs     10/14/22  0629 10/15/22  0802 10/16/22  0608    138 138   K 4.0 3.6 3.8    104 105   CO2 27 25 23   BUN 10 5* 10   CREATININE <0.5* <0.5* <0.5*   CALCIUM 9.1 9.1 9.0       Urinalysis:      Lab Results   Component Value Date/Time    NITRU Negative 10/01/2022 04:05 AM    WBCUA 15 10/01/2022 04:05 AM    BACTERIA 1+ 10/01/2022 04:05 AM    RBCUA 1 10/01/2022 04:05 AM    BLOODU Negative 10/01/2022 04:05 AM    SPECGRAV 1.037 10/01/2022 04:05 AM    GLUCOSEU Negative 10/01/2022 04:05 AM       Assessment/Plan:    Acute metabolic encephalopathy   Suspected subclinical seizures in a known seizure disorder patient. Initially patient Pt still w/ minimal response to verbal cues and pain. -MRI Brain without contrast with left MARCELO stroke  -MRA Head and Neck with contrast with no large vessel occlusion  Subclinical seziures on cEEG- neuro recs noted and appreciated. Keppra dosage adjusted per neuro. Cont cEEG. Acute CVA   -MRI brain with left MARCELO stroke  -Continue on ASA/plavix/statin. -S/p MARLO on 10/12/22 with no cardiac thrombus     UTI:  Cx negative therefore rocephin was discontinued. Hypernatremia  -Likely secondary to severe dehydration- improved w/ fluids. Follow I/O and renal fnx.     Hypomagnesemia:  Replaced IV.    DVT Prophylaxis: Lovenox  Diet: Diet NPO  ADULT TUBE FEEDING; PEG; Standard with Fiber; Continuous; 20; Yes; 10; Q 4 hours; 40; 30; Q 4 hours  Code Status: Full David Melo MD

## 2022-10-16 NOTE — PROCEDURES
Continuous EEG monitoring record    Patient name: Sunshine Avelar    START: 10/15/2022 @ 10:00am   END: 10/16/2022 @ 11:18am       Electroencephalographer: Danielle Cabello MD PhD      CLINICAL DETAILS:  This EEG was performed on this 62 y. o.yo female admitted for Altered mental Status and concer for subclinical seizures      TECHNICAL DETAILS:  Continuous video-EEG monitoring was performed with 27 surface scalp electrodes placed according to the International 10-20 electrode placement system, using a 32-channel 1001 Menus headbox. All EEG and video information was acquired digitally, including the use of automated spike and seizure detection software to detect epileptiform activity. An event button was also available to be depressed during clinical events. 10/15/2022   22:00pm to 11:18am on 10/16/2022  SEIZURES:  None  INTERICTAL:  Frequent focal left frontottemporal sharps. PUSHBUTTONS:  None  BACKGROUND:  Abundant generalized 6-7 Hz theta slowing of the background. Frequent focal left frontal delta slowing. EKG:  Regular    10/15/2022  10:00am to 22:00pm   SEIZURES:  None  INTERICTAL:  Frequent focal left frontottemporal sharps. PUSHBUTTONS:  None  BACKGROUND:  Abundant generalized 6-7 Hz theta slowing of the background. Frequent focal left frontal delta slowing. EKG:  Regular    CLINICAL INTERPRETATION:  This was an abnormal tracing for age and state due to a mild generalized slow wave abnormality indicating diffuse cerebral dysfunction which can be of multiple causes, including structural, or vascular abnormalities, toxic/metabolic conditions, hydrocephalus, or postictal conditions. Focal slowing over the left frontotemporal region indicates a region of cerebral dysfunction that may represent an underlying structural defect. No seizures were seen. Clinical correlation is advised.         Danielle Cabello MD PhD Speaking Coherently

## 2022-10-17 VITALS
RESPIRATION RATE: 18 BRPM | TEMPERATURE: 98.6 F | HEART RATE: 89 BPM | HEIGHT: 60 IN | DIASTOLIC BLOOD PRESSURE: 89 MMHG | OXYGEN SATURATION: 97 % | BODY MASS INDEX: 28.12 KG/M2 | SYSTOLIC BLOOD PRESSURE: 132 MMHG

## 2022-10-17 PROCEDURE — 6370000000 HC RX 637 (ALT 250 FOR IP): Performed by: NURSE PRACTITIONER

## 2022-10-17 PROCEDURE — 6360000002 HC RX W HCPCS: Performed by: INTERNAL MEDICINE

## 2022-10-17 PROCEDURE — 2580000003 HC RX 258: Performed by: INTERNAL MEDICINE

## 2022-10-17 PROCEDURE — 6370000000 HC RX 637 (ALT 250 FOR IP): Performed by: INTERNAL MEDICINE

## 2022-10-17 RX ORDER — LEVETIRACETAM 100 MG/ML
1000 SOLUTION ORAL 2 TIMES DAILY
Qty: 600 ML | Refills: 0 | Status: SHIPPED | OUTPATIENT
Start: 2022-10-17 | End: 2022-11-16

## 2022-10-17 RX ADMIN — CARVEDILOL 6.25 MG: 6.25 TABLET, FILM COATED ORAL at 08:35

## 2022-10-17 RX ADMIN — Medication 1000 UNITS: at 08:35

## 2022-10-17 RX ADMIN — Medication 100 MG: at 08:35

## 2022-10-17 RX ADMIN — LEVETIRACETAM 1000 MG: 100 SOLUTION ORAL at 08:35

## 2022-10-17 RX ADMIN — ATORVASTATIN CALCIUM 80 MG: 80 TABLET, FILM COATED ORAL at 20:12

## 2022-10-17 RX ADMIN — MIRTAZAPINE 7.5 MG: 15 TABLET, FILM COATED ORAL at 08:34

## 2022-10-17 RX ADMIN — Medication 10 ML: at 08:35

## 2022-10-17 RX ADMIN — Medication 6 MG: at 20:11

## 2022-10-17 RX ADMIN — ENOXAPARIN SODIUM 40 MG: 100 INJECTION SUBCUTANEOUS at 08:35

## 2022-10-17 RX ADMIN — VERAPAMIL HYDROCHLORIDE 120 MG: 80 TABLET ORAL at 20:11

## 2022-10-17 RX ADMIN — CARVEDILOL 6.25 MG: 6.25 TABLET, FILM COATED ORAL at 15:26

## 2022-10-17 RX ADMIN — Medication 10 ML: at 08:30

## 2022-10-17 RX ADMIN — LEVETIRACETAM 1000 MG: 100 SOLUTION ORAL at 20:12

## 2022-10-17 RX ADMIN — ASPIRIN 81 MG 81 MG: 81 TABLET ORAL at 08:35

## 2022-10-17 RX ADMIN — CYANOCOBALAMIN TAB 1000 MCG 1000 MCG: 1000 TAB at 08:35

## 2022-10-17 RX ADMIN — THERA TABS 1 TABLET: TAB at 08:35

## 2022-10-17 ASSESSMENT — PAIN SCALES - WONG BAKER
WONGBAKER_NUMERICALRESPONSE: 0

## 2022-10-17 NOTE — PLAN OF CARE
Problem: Safety - Medical Restraint  Goal: Remains free of injury from restraints (Restraint for Interference with Medical Device)  Description: INTERVENTIONS:  1. Determine that other, less restrictive measures have been tried or would not be effective before applying the restraint  2. Evaluate the patient's condition at the time of restraint application  3. Inform patient/family regarding the reason for restraint  4. Q2H: Monitor safety, psychosocial status, comfort, nutrition and hydration  10/17/2022 0941 by Ashley Avendano RN  Outcome: Progressing     Problem: Discharge Planning  Goal: Discharge to home or other facility with appropriate resources  10/17/2022 0941 by Ashley Avendano RN  Outcome: Progressing     Problem: Skin/Tissue Integrity  Goal: Absence of new skin breakdown  Description: 1. Monitor for areas of redness and/or skin breakdown  2. Assess vascular access sites hourly  3. Every 4-6 hours minimum:  Change oxygen saturation probe site  4. Every 4-6 hours:  If on nasal continuous positive airway pressure, respiratory therapy assess nares and determine need for appliance change or resting period.   10/17/2022 0941 by Ashley Avendano RN  Outcome: Progressing     Problem: Safety - Adult  Goal: Free from fall injury  10/17/2022 0941 by Ashley Avendano RN  Outcome: Progressing     Problem: ABCDS Injury Assessment  Goal: Absence of physical injury  10/17/2022 0941 by Ashley Avendano RN  Outcome: Progressing     Problem: Pain  Goal: Verbalizes/displays adequate comfort level or baseline comfort level  10/17/2022 0941 by Ashley Avendano RN  Outcome: Progressing     Problem: Nutrition Deficit:  Goal: Optimize nutritional status  10/17/2022 0941 by Ashley Avendano RN  Outcome: Progressing

## 2022-10-17 NOTE — PROGRESS NOTES
Rx [id number] levetiracetam 100 mg/ml soln retrieved from Pharmacy. In patient room with belongings.

## 2022-10-17 NOTE — PROGRESS NOTES
Hospitalist Progress Note      PCP: Annabelle Hernandez MD    Date of Admission: 10/13/2022    HPI:  62 y.o. female who presents from inpatient [facility] for neurology consultation and continuous video EEG monitoring. Patient is altered, does not wake up to verbal or painful stimuli and unable to obtain any history from her. No respiratory distress observed    History obtained from medical records from the previous hospital.  Patient has history of developmental delay, seizure disorder, prior stroke and sickle cell disease. Per report patient is verbally interactive at baseline but since 10/12 patient has not been conversant as normal.  Patient was sent to ED at [facility] from her SNF for altered mental status. At prior facility patient has been awake but was not answering any questions and has been able to make eye contact. PEG tube has placed on 10/13     Given no improvement in mental status patient was transferred here for continuous video EEG monitoring to rule out subclinical seizures. Subjective:    Nonverbal.  Asleep.     Medications:  Reviewed    Infusion Medications    sodium chloride       Scheduled Medications    levETIRAcetam  1,000 mg Per G Tube BID    aspirin  81 mg Per G Tube Daily    atorvastatin  80 mg Per G Tube Nightly    carvedilol  6.25 mg Per G Tube BID WC    enoxaparin  40 mg SubCUTAneous Daily    melatonin  6 mg Per G Tube Nightly    mirtazapine  7.5 mg Per G Tube Daily    multivitamin  1 tablet Per G Tube Daily    vitamin B-1  100 mg Per G Tube Daily    sodium chloride flush  5-40 mL IntraVENous 2 times per day    verapamil  120 mg Per G Tube Nightly    vitamin B-12  1,000 mcg Per G Tube Daily    Vitamin D  1,000 Units Per G Tube Daily    sodium chloride flush  5-40 mL IntraVENous 2 times per day     PRN Meds: labetalol, sodium chloride flush, sodium chloride, ondansetron **OR** ondansetron, polyethylene glycol, acetaminophen **OR** acetaminophen, LORazepam      Intake/Output Summary (Last 24 hours) at 10/17/2022 1300  Last data filed at 10/17/2022 0732  Gross per 24 hour   Intake 1717 ml   Output 700 ml   Net 1017 ml       Physical Exam Performed:    /87   Pulse 82   Temp 98.1 °F (36.7 °C) (Axillary)   Resp 18   Ht 5' (1.524 m)   SpO2 97%   BMI 28.12 kg/m²     General appearance: unresponsive  Respiratory:  Normal respiratory effort. Clear to auscultation  Cardiovascular: Regular rate and rhythm   Abdomen: Soft, non-tender  Neurologic: non verbal    Labs:   Recent Labs     10/16/22  0608   WBC 5.1   HGB 9.6*   HCT 30.5*   *     Recent Labs     10/15/22  0802 10/16/22  0608    138   K 3.6 3.8    105   CO2 25 23   BUN 5* 10   CREATININE <0.5* <0.5*   CALCIUM 9.1 9.0       Urinalysis:      Lab Results   Component Value Date/Time    NITRU Negative 10/01/2022 04:05 AM    WBCUA 15 10/01/2022 04:05 AM    BACTERIA 1+ 10/01/2022 04:05 AM    RBCUA 1 10/01/2022 04:05 AM    BLOODU Negative 10/01/2022 04:05 AM    SPECGRAV 1.037 10/01/2022 04:05 AM    GLUCOSEU Negative 10/01/2022 04:05 AM       Assessment/Plan:    Acute metabolic encephalopathy   Suspected subclinical seizures in a known seizure disorder patient. Initially patient Pt still w/ minimal response to verbal cues and pain. -MRI Brain without contrast with left MARCELO stroke  -MRA Head and Neck with contrast with no large vessel occlusion  Subclinical seziures on cEEG- neuro recs noted and appreciated. Keppra dosage adjusted per neuro. Cont cEEG. Acute CVA   -MRI brain with left MARCELO stroke  -Continue on ASA/plavix/statin. -S/p MARLO on 10/12/22 with no cardiac thrombus     UTI:  Cx negative therefore rocephin was discontinued. Hypernatremia  -Likely secondary to severe dehydration- improved w/ fluids. Follow I/O and renal fnx. Hypomagnesemia:  Replaced IV.     DVT Prophylaxis: Lovenox  Diet: Diet NPO  ADULT TUBE FEEDING; PEG; Standard with Fiber; Continuous; 20; Yes; 10; Q 4 hours; 40; 30; Q 4 hours  Code Status: Full Code    Dispo - back to LTC on dc.       Jose Pritchard MD

## 2022-10-17 NOTE — CARE COORDINATION
Case Management Assessment            Discharge Note                    Date / Time of Note: 10/17/2022 4:30 PM                  Discharge Note Completed by: MP Mackey, LSW    Patient Name: Yajaira Casiano   YOB: 1965  Diagnosis: Acute encephalopathy [G93.40]  AMS (altered mental status) [R41.82]   Date / Time: 10/13/2022  9:47 PM    Current PCP: Crispin Brennan MD  Clinic patient: No    Hospitalization in the last 30 days: No    Advance Directives:  Code Status: Full Code  [place] DNR form completed and on chart: No    Financial:  Payor: [employer] / Plan: [place] / Product Type: *No Product type* /      Pharmacy:  No Pharmacies [address] medications?:    Assistance provided by Case Management: None at this time    Does patient want to participate in local refill/ meds to beds program?: Yes    Meds To Beds General Rules:  1. Can ONLY be done Monday- Friday between 8:30am-5pm  2. Prescription(s) must be in pharmacy by 3pm to be filled same day  3. Copy of patient's insurance/ prescription drug card and patient face sheet must be sent along with the prescription(s)  4. Cost of Rx cannot be added to hospital bill. If financial assistance is needed, please contact unit  or ;  or  CANNOT provide pharmacy voucher for patients co-pays  5.  Patients can then  the prescription on their way out of the hospital at discharge, or pharmacy can deliver to the bedside if staff is available. (payment due at time of pick-up or delivery - cash, check, or card accepted)     Able to afford home medications/ co-pay costs: Yes    ADLS:  Current PT AM-PAC Score:   /24  Current OT AM-PAC Score:   /24      DISCHARGE Disposition: [facility] ([facility]): [facility]  Phone: [phone]  Fax: [phone]    LOC at discharge: [address] Completed: Yes    Notification completed in HENS/PAS?:  Not Applicable    IMM Completed:   Not Indicated    Transportation:  Transportation PLAN for discharge: EMS transportation   Mode of Transport: Ambulance stretcher - BLS  Reason for medical transport: Bed confined: Meets the following criteria 1) unable to get out of bed without assistance or ambulate, 2) unable to safely sit up in a wheelchair, 3) unable to maintain erect seating position in a chair for time needed for transport  Name of [address] [place] [address] [address]: [facility]  Phone: [phone]  Time of Transport: 8:30pm    Transport form completed: Yes    Home Care:  [address] ordered at discharge: No    Durable Medical Equipment:  DME Provider: none  Equipment obtained during hospitalization: none    Home Oxygen and Respiratory Equipment:  Oxygen needed at discharge?: No    Dialysis:  Dialysis patient: No    Referrals made at [facility] for outpatient continued care:  Not Applicable    Additional CM Notes:     Pt is from [facility] and will return back. SHANTEL scheduled transport at 8:30pm. SHANTEL was finally able to contact  in admissions they are able to accept PT back. SHANTEL informed Pt's aunt of DCP tonight, she is in agreement. Report: [phone] fax: [phone]    The Plan for Transition of Care is related to the following treatment goals of Acute encephalopathy [G93.40]  AMS (altered mental status) [R41.82]    The Patient and/or patient representative Yoselyn and her family were provided with a choice of provider and agrees with the discharge plan Yes    Freedom of choice list was provided with basic dialogue that supports the patient's individualized plan of care/goals and shares the quality data associated with the providers.  Yes    Care Transitions patient: No    MP Henao, [facility] [employer]  Case Management Department  Ph: [phone]

## 2022-10-17 NOTE — PROGRESS NOTES
Physician Progress Note      Kindra Green  [facility] #:                  [id number]  :                       1965  ADMIT DATE:       10/13/2022 9:47 PM  [address] [place] DATE:  Reba Rasheeda  PROVIDER #:        Jesus Manuel Salamanca MD          QUERY TEXT:    Pt admitted from OSH with subclinical seizures following new CVA. Pt noted to   have hx of seizure d/o & previous CVA in . If possible, please document in   progress notes and discharge summary after study the etiology of the seizure: The medical record reflects the following:  Risk Factors: CVA  & Sep., known sz d/o  Clinical Indicators: Per Neuro c/s 10/14  \"PMH seizure disorder on Keppra,   recent stroke (left basal ganglia and left pontine) in 2022, new left MARCELO   infarct. EEG at [facility] was abnormal x2 with epileptiform abnormalities form the   left hemispheric leads\". 10/14, 1224 cvEEG: Focal Left frontotemporal   subclinical seizures lasting 30-40 seconds each. Treatment: cvEEG, Keppra, Neuro c/s  Options provided:  -- Seizure as a sequela of current CVA  -- Seizure as a sequela of prior CVA in   -- Seizure due to known seizure disorder, unrelated to previous or current CVA  -- Other - I will add my own diagnosis  -- Disagree - Not applicable / Not valid  -- Disagree - Clinically unable to determine / Unknown  -- Refer to Clinical Documentation Reviewer    PROVIDER RESPONSE TEXT:    -- Seizure as a sequela of current CVA    Query created by:  Myriam Trinh on 10/17/2022 11:16 AM      Electronically signed by:  Jesus Manuel Salamanca MD 10/17/2022 12:23 PM

## 2022-10-17 NOTE — PROGRESS NOTES
SHIFT SUMMARY    Admission dx-seizures    WERNER orientation, pt nonverbal/only responds to pain    Peg-jevity-NPO    Bedrest    Purewick/incontinent    CEEG removed today    Hx of stroke June 2022    Stage II PU to bottom    Plan: TBD, return to NH?      TAMICA Armas RN

## 2022-10-17 NOTE — DISCHARGE INSTR - COC
Continuity of Care Form    Patient Name: Susana Monterroso   :  1965  MRN:  [id number]    Admit date:  10/13/2022  Discharge date:  ***    Code Status Order: Full Code   Advance Directives:     Admitting Physician:  Rohith Thompson MD  PCP: Estefany Crow MD    Discharging Nurse: [facility] Unit/Room#: 5302/9755-54  Discharging Unit Phone Number: ***    Emergency Contact:   Extended Emergency Contact Information  Primary Emergency Contact:  [facility] [address] Phone: [phone]  Mobile Phone: [phone]  Relation: Child  Secondary Emergency Contact: [address] Phone: [phone]  Relation: Aunt/Uncle    Past Surgical History:  Past Surgical History:   Procedure Laterality Date    GASTROSTOMY TUBE PLACEMENT N/A 10/13/2022    ESOPHAGOGASTRODUODENOSCOPY WITH  PERCUTANEOUS ENDOSCOPIC GASTROSTOMY  TUBE PLACEMENT performed by Prema Maier MD at [place] [address]  10/13/2022    EGD BIOPSY performed by Prema Maier MD at [facility] ENDOSCOPY       Immunization History:   Immunization History   Administered Date(s) Administered    COVID-19, PFIZER PURPLE top, DILUTE for use, (age 15 y+), 30mcg/0.3mL 2021, 2021, 2021       Active Problems:  Patient Active Problem List   Diagnosis Code    Altered mental status R41.82    Hypernatremia E87.0    Hypokalemia E87.6    GARRETT (acute kidney injury) ([facility] [address] N17.9    Severe malnutrition ([facility] [address] E43    Acute encephalopathy G93.40    AMS (altered mental status) R41.82       Isolation/Infection:   Isolation            No Isolation          Patient Infection Status       Infection Onset Added Last Indicated Last Indicated By Review Planned Expiration Resolved Resolved By    None active    Resolved    COVID-19 (Rule Out) 10/03/22 10/03/22 10/03/22 Respiratory Panel, Molecular, with COVID-19 (Restricted: peds pts or suitable admitted adults) (Ordered)   10/03/22 Rule-Out Test Resulted            Nurse Assessment:  Last Vital Signs: BP (!) 142/87   Pulse 83   Temp 98.2 °F (36.8 °C) (Axillary)   Resp 18   Ht 5' (1.524 m)   SpO2 95%   BMI 28.12 kg/m²     Last documented pain score (0-10 scale):    Last Weight:   Wt Readings from Last 1 Encounters:   10/13/22 143 lb 15.4 oz (65.3 kg)     Mental Status:   Not able to assess    IV Access:  { CHRISTIANO IV ACCESS:866798731}    Nursing Mobility/ADLs:  Walking   {CHP DME UZLE:166872413}  Transfer  {CHP DME GOAM:445360382}  Bathing  {CHP DME APNW:142379959}  Dressing  {CHP DME JOSE ELIAS:591216528}  Toileting  {CHP DME DMTH:199577726}  Feeding  {CHP DME ZMKB:647734822}  Med Admin  {CHP DME ZWLF:612902873}  Med Delivery   { CHRISTIANO MED Delivery:736539917}    Wound Care Documentation and Therapy:  Wound 10/06/22 Toe (Comment  which one) Anterior; Left open area to L 2nd toe and L big toe (Active)   Wound Etiology Arterial 10/11/22 2014   Dressing Status Other (Comment) 10/10/22 1955   Wound Cleansed Not Cleansed 10/11/22 2014   Dressing/Treatment Open to air 10/17/22 0732   Offloading for Diabetic Foot Ulcers Other (comment) 10/11/22 2014   Wound Assessment Pink/red 10/12/22 1140   Drainage Amount None 10/12/22 1140   Drainage Description Other (Comment) 10/11/22 2014   Odor None 10/11/22 2014   Carmen-wound Assessment Dry/flaky 10/12/22 1140   Number of days: 10       Wound 10/06/22 Elbow Right;Posterior pink (Active)   Dressing Status Clean;Dry; Intact 10/11/22 2014   Wound Cleansed Not Cleansed 10/10/22 1955   Dressing/Treatment Open to air 10/17/22 0732   Offloading for Diabetic Foot Ulcers Other (comment) 10/11/22 2014   Wound Assessment Other (Comment) 10/13/22 1620   Drainage Amount None 10/17/22 0732   Odor None 10/17/22 0732   Carmen-wound Assessment Dry/flaky 10/17/22 0732   Margins Defined edges 10/10/22 1955   Number of days: 10       Wound 10/06/22 Buttocks Right (Active)   Dressing Status Clean;Dry; Intact 10/15/22 0745   Wound Cleansed Other (Comment) 10/12/22 1140   Dressing/Treatment Barrier film 10/17/22 0732   Wound Assessment Other (Comment) 10/11/22 2014   Drainage Amount None 10/11/22 2014   Number of days: 10       Wound 10/06/22 Ear Left pink, scabbed (Active)   Wound Cleansed Not Cleansed 10/13/22 1620   Dressing/Treatment Open to air 10/17/22 0732   Wound Assessment Dry 10/15/22 0745   Drainage Amount None 10/17/22 0732   Odor None 10/11/22 2014   Carmen-wound Assessment Dry/flaky;Fragile 10/11/22 2014   Number of days: 10        Elimination:  Continence: Bowel: {YES / II:03162}  Bladder: {YES / HL:46853}  Urinary Catheter: {Urinary Catheter:710466081}   Colostomy/Ileostomy/Ileal Conduit: {YES / KH:86428}       Date of Last BM: ***    Intake/Output Summary (Last 24 hours) at 10/17/2022 1109  Last data filed at 10/17/2022 0732  Gross per 24 hour   Intake 1717 ml   Output 700 ml   Net 1017 ml     I/O last 3 completed shifts:   In:  [NG/GT:2114]  Out: 1300 [Urine:1300]    Safety Concerns:     [employer] Safety Concerns:452596322}    Impairments/Disabilities:      [employer] Impairments/Disabilities:112682468}    Nutrition Therapy:  Current Nutrition Therapy:   [employer] Diet List:820688589}    Routes of Feeding: {CHP DME Other Feedings:413547802}  Liquids: {Slp liquid thickness:06243}  Daily Fluid Restriction: {CHP DME Yes amt example:210212286}  Last Modified Barium Swallow with Video (Video Swallowing Test): {Done Not Done CFZW:575788347}    Treatments at the Time of Hospital Discharge:   Respiratory Treatments: ***  Oxygen Therapy:  {Therapy; copd oxygen:17732}  Ventilator:    { CANDIDO Vent LSQR:857383315}    Rehab Therapies: {THERAPEUTIC INTERVENTION:8628056938}  Weight Bearing Status/Restrictions: [address] [facility] Weight Bearin}  Other Medical Equipment (for information only, NOT a DME order):  {EQUIPMENT:062653794}  Other Treatments: ***    Patient's personal belongings (please select all that are sent with patient):  {P DME Belongings:001671403}    RN SIGNATURE:  {Esignature:257268610}    CASE MANAGEMENT/SOCIAL WORK SECTION    Inpatient Status Date: 10/13/22    Readmission Risk Assessment Score:  Readmission Risk              Risk of Unplanned Readmission:  18           Discharging to Facility/ Agency   Name: [facility]   Address: [address]  Phone: [phone]  Fax: [phone]    Dialysis Facility (if applicable)   Name:  Address:  Dialysis Schedule:  Phone:  Fax:    / signature: Electronically signed by MP Shultz, ALISAW on 10/17/22 at 4:36 PM EDT    PHYSICIAN SECTION    Prognosis: Fair    Condition at Discharge: Stable    Rehab Potential (if transferring to Rehab): Guarded    Recommended Labs or Other Treatments After Discharge:     Physician Certification: I certify the above information and transfer of Lilia Friedman  is necessary for the continuing treatment of the diagnosis listed and that she requires Intermediate Nursing Care for greater 30 days.      Update Admission H&P: No change in H&P    PHYSICIAN SIGNATURE:  Electronically signed by Fe Calixto MD on 10/17/22 at 1:01 PM EDT

## 2022-10-18 NOTE — PROGRESS NOTES
Patient discharged. Patient sent with transport and all belongings. IV removed. Peg tube flushed. Attempted to call report no answer twice.

## 2022-10-31 ENCOUNTER — APPOINTMENT (OUTPATIENT)
Dept: CT IMAGING | Age: 57
DRG: 720 | End: 2022-10-31
Payer: MEDICAID

## 2022-10-31 ENCOUNTER — APPOINTMENT (OUTPATIENT)
Dept: GENERAL RADIOLOGY | Age: 57
DRG: 720 | End: 2022-10-31
Payer: MEDICAID

## 2022-10-31 ENCOUNTER — HOSPITAL ENCOUNTER (INPATIENT)
Age: 57
LOS: 1 days | Discharge: HOSPICE | DRG: 720 | End: 2022-11-01
Attending: EMERGENCY MEDICINE | Admitting: STUDENT IN AN ORGANIZED HEALTH CARE EDUCATION/TRAINING PROGRAM
Payer: MEDICAID

## 2022-10-31 DIAGNOSIS — R93.5 ABNORMAL CT OF THE ABDOMEN: ICD-10-CM

## 2022-10-31 DIAGNOSIS — R41.82 ALTERED MENTAL STATUS, UNSPECIFIED ALTERED MENTAL STATUS TYPE: ICD-10-CM

## 2022-10-31 DIAGNOSIS — J96.01 ACUTE RESPIRATORY FAILURE WITH HYPOXIA (HCC): ICD-10-CM

## 2022-10-31 DIAGNOSIS — A41.9 SEPTICEMIA (HCC): Primary | ICD-10-CM

## 2022-10-31 DIAGNOSIS — Z51.5 HOSPICE CARE: ICD-10-CM

## 2022-10-31 PROBLEM — R65.21 SEPTIC SHOCK (HCC): Status: ACTIVE | Noted: 2022-10-31

## 2022-10-31 LAB
A/G RATIO: 0.7 (ref 1.1–2.2)
ACANTHOCYTES: ABNORMAL
ALBUMIN SERPL-MCNC: 3.1 G/DL (ref 3.4–5)
ALP BLD-CCNC: 183 U/L (ref 40–129)
ALT SERPL-CCNC: 282 U/L (ref 10–40)
AMMONIA: 21 UMOL/L (ref 11–51)
ANION GAP SERPL CALCULATED.3IONS-SCNC: 14 MMOL/L (ref 3–16)
ANISOCYTOSIS: ABNORMAL
AST SERPL-CCNC: 368 U/L (ref 15–37)
BACTERIA: NORMAL /HPF
BASE EXCESS ARTERIAL: -1 MMOL/L (ref -3–3)
BASOPHILS ABSOLUTE: 0.1 K/UL (ref 0–0.2)
BASOPHILS RELATIVE PERCENT: 0.3 %
BILIRUB SERPL-MCNC: 0.5 MG/DL (ref 0–1)
BILIRUBIN URINE: NEGATIVE
BLOOD, URINE: NEGATIVE
BUN BLDV-MCNC: 63 MG/DL (ref 7–20)
CALCIUM SERPL-MCNC: 8.8 MG/DL (ref 8.3–10.6)
CARBOXYHEMOGLOBIN ARTERIAL: 0.5 % (ref 0–1.5)
CHLORIDE BLD-SCNC: 106 MMOL/L (ref 99–110)
CLARITY: CLEAR
CO2: 22 MMOL/L (ref 21–32)
COLOR: YELLOW
CREAT SERPL-MCNC: 0.9 MG/DL (ref 0.6–1.1)
EKG ATRIAL RATE: 146 BPM
EKG DIAGNOSIS: NORMAL
EKG P AXIS: 34 DEGREES
EKG P-R INTERVAL: 164 MS
EKG Q-T INTERVAL: 282 MS
EKG QRS DURATION: 84 MS
EKG QTC CALCULATION (BAZETT): 439 MS
EKG R AXIS: 204 DEGREES
EKG T AXIS: 46 DEGREES
EKG VENTRICULAR RATE: 146 BPM
EOSINOPHILS ABSOLUTE: 0 K/UL (ref 0–0.6)
EOSINOPHILS RELATIVE PERCENT: 0 %
EPITHELIAL CELLS, UA: 1 /HPF (ref 0–5)
GFR SERPL CREATININE-BSD FRML MDRD: >60 ML/MIN/{1.73_M2}
GLUCOSE BLD-MCNC: 123 MG/DL (ref 70–99)
GLUCOSE BLD-MCNC: 136 MG/DL (ref 70–99)
GLUCOSE BLD-MCNC: 159 MG/DL (ref 70–99)
GLUCOSE URINE: NEGATIVE MG/DL
HCO3 ARTERIAL: 23.1 MMOL/L (ref 21–29)
HCT VFR BLD CALC: 26.6 % (ref 36–48)
HEMOGLOBIN, ART, EXTENDED: 7.8 G/DL (ref 12–16)
HEMOGLOBIN: 8.3 G/DL (ref 12–16)
HYALINE CASTS: 1 /LPF (ref 0–8)
KEPPRA DOSE AMT: ABNORMAL
KEPPRA: 70.7 UG/ML (ref 6–46)
KETONES, URINE: NEGATIVE MG/DL
LACTIC ACID, SEPSIS: 0.7 MMOL/L (ref 0.4–1.9)
LACTIC ACID, SEPSIS: 2.2 MMOL/L (ref 0.4–1.9)
LEUKOCYTE ESTERASE, URINE: ABNORMAL
LIPASE: 157 U/L (ref 13–60)
LYMPHOCYTES ABSOLUTE: 0.6 K/UL (ref 1–5.1)
LYMPHOCYTES RELATIVE PERCENT: 2.6 %
MCH RBC QN AUTO: 22.8 PG (ref 26–34)
MCHC RBC AUTO-ENTMCNC: 31 G/DL (ref 31–36)
MCV RBC AUTO: 73.5 FL (ref 80–100)
METHEMOGLOBIN ARTERIAL: 0.3 %
MICROSCOPIC EXAMINATION: YES
MONOCYTES ABSOLUTE: 1.1 K/UL (ref 0–1.3)
MONOCYTES RELATIVE PERCENT: 4.6 %
NEUTROPHILS ABSOLUTE: 22 K/UL (ref 1.7–7.7)
NEUTROPHILS RELATIVE PERCENT: 92.5 %
NITRITE, URINE: NEGATIVE
O2 CONTENT ARTERIAL: 11 ML/DL
O2 SAT, ARTERIAL: 99.4 %
O2 THERAPY: ABNORMAL
OVALOCYTES: ABNORMAL
PCO2 ARTERIAL: 34.8 MMHG (ref 35–45)
PDW BLD-RTO: 19.4 % (ref 12.4–15.4)
PERFORMED ON: ABNORMAL
PERFORMED ON: ABNORMAL
PH ARTERIAL: 7.43 (ref 7.35–7.45)
PH UA: 5.5 (ref 5–8)
PLATELET # BLD: 224 K/UL (ref 135–450)
PLATELET SLIDE REVIEW: ADEQUATE
PMV BLD AUTO: 10.5 FL (ref 5–10.5)
PO2 ARTERIAL: 171 MMHG (ref 75–108)
POIKILOCYTES: ABNORMAL
POTASSIUM REFLEX MAGNESIUM: 4.7 MMOL/L (ref 3.5–5.1)
PRO-BNP: 152 PG/ML (ref 0–124)
PROTEIN UA: 30 MG/DL
RAPID INFLUENZA  B AGN: NEGATIVE
RAPID INFLUENZA A AGN: NEGATIVE
RBC # BLD: 3.62 M/UL (ref 4–5.2)
RBC UA: 0 /HPF (ref 0–4)
SARS-COV-2, NAAT: ABNORMAL
SARS-COV-2, NAAT: NOT DETECTED
SARS-COV-2: NOT DETECTED
SODIUM BLD-SCNC: 142 MMOL/L (ref 136–145)
SPECIFIC GRAVITY UA: 1.02 (ref 1–1.03)
TCO2 ARTERIAL: 24.1 MMOL/L
TOTAL PROTEIN: 7.6 G/DL (ref 6.4–8.2)
TROPONIN: <0.01 NG/ML
URINE REFLEX TO CULTURE: ABNORMAL
URINE TYPE: ABNORMAL
UROBILINOGEN, URINE: 1 E.U./DL
WBC # BLD: 23.7 K/UL (ref 4–11)
WBC UA: 1 /HPF (ref 0–5)

## 2022-10-31 PROCEDURE — 71045 X-RAY EXAM CHEST 1 VIEW: CPT

## 2022-10-31 PROCEDURE — 87040 BLOOD CULTURE FOR BACTERIA: CPT

## 2022-10-31 PROCEDURE — 96361 HYDRATE IV INFUSION ADD-ON: CPT

## 2022-10-31 PROCEDURE — APPNB15 APP NON BILLABLE TIME 0-15 MINS: Performed by: NURSE PRACTITIONER

## 2022-10-31 PROCEDURE — 2000000000 HC ICU R&B

## 2022-10-31 PROCEDURE — 82140 ASSAY OF AMMONIA: CPT

## 2022-10-31 PROCEDURE — 81001 URINALYSIS AUTO W/SCOPE: CPT

## 2022-10-31 PROCEDURE — 87635 SARS-COV-2 COVID-19 AMP PRB: CPT

## 2022-10-31 PROCEDURE — 2700000000 HC OXYGEN THERAPY PER DAY

## 2022-10-31 PROCEDURE — 80053 COMPREHEN METABOLIC PANEL: CPT

## 2022-10-31 PROCEDURE — U0003 INFECTIOUS AGENT DETECTION BY NUCLEIC ACID (DNA OR RNA); SEVERE ACUTE RESPIRATORY SYNDROME CORONAVIRUS 2 (SARS-COV-2) (CORONAVIRUS DISEASE [COVID-19]), AMPLIFIED PROBE TECHNIQUE, MAKING USE OF HIGH THROUGHPUT TECHNOLOGIES AS DESCRIBED BY CMS-2020-01-R: HCPCS

## 2022-10-31 PROCEDURE — 2580000003 HC RX 258: Performed by: NURSE PRACTITIONER

## 2022-10-31 PROCEDURE — 80177 DRUG SCRN QUAN LEVETIRACETAM: CPT

## 2022-10-31 PROCEDURE — 6360000002 HC RX W HCPCS: Performed by: STUDENT IN AN ORGANIZED HEALTH CARE EDUCATION/TRAINING PROGRAM

## 2022-10-31 PROCEDURE — 71260 CT THORAX DX C+: CPT

## 2022-10-31 PROCEDURE — 83690 ASSAY OF LIPASE: CPT

## 2022-10-31 PROCEDURE — 2500000003 HC RX 250 WO HCPCS: Performed by: EMERGENCY MEDICINE

## 2022-10-31 PROCEDURE — 96366 THER/PROPH/DIAG IV INF ADDON: CPT

## 2022-10-31 PROCEDURE — 2580000003 HC RX 258: Performed by: STUDENT IN AN ORGANIZED HEALTH CARE EDUCATION/TRAINING PROGRAM

## 2022-10-31 PROCEDURE — 2580000003 HC RX 258: Performed by: EMERGENCY MEDICINE

## 2022-10-31 PROCEDURE — 6370000000 HC RX 637 (ALT 250 FOR IP): Performed by: PHYSICIAN ASSISTANT

## 2022-10-31 PROCEDURE — 87804 INFLUENZA ASSAY W/OPTIC: CPT

## 2022-10-31 PROCEDURE — 85025 COMPLETE CBC W/AUTO DIFF WBC: CPT

## 2022-10-31 PROCEDURE — 93010 ELECTROCARDIOGRAM REPORT: CPT | Performed by: INTERNAL MEDICINE

## 2022-10-31 PROCEDURE — 36600 WITHDRAWAL OF ARTERIAL BLOOD: CPT

## 2022-10-31 PROCEDURE — 6360000002 HC RX W HCPCS: Performed by: PHYSICIAN ASSISTANT

## 2022-10-31 PROCEDURE — 36415 COLL VENOUS BLD VENIPUNCTURE: CPT

## 2022-10-31 PROCEDURE — 83605 ASSAY OF LACTIC ACID: CPT

## 2022-10-31 PROCEDURE — 6360000004 HC RX CONTRAST MEDICATION: Performed by: PHYSICIAN ASSISTANT

## 2022-10-31 PROCEDURE — 94761 N-INVAS EAR/PLS OXIMETRY MLT: CPT

## 2022-10-31 PROCEDURE — U0005 INFEC AGEN DETEC AMPLI PROBE: HCPCS

## 2022-10-31 PROCEDURE — 2580000003 HC RX 258: Performed by: PHYSICIAN ASSISTANT

## 2022-10-31 PROCEDURE — 99285 EMERGENCY DEPT VISIT HI MDM: CPT

## 2022-10-31 PROCEDURE — 82803 BLOOD GASES ANY COMBINATION: CPT

## 2022-10-31 PROCEDURE — 83880 ASSAY OF NATRIURETIC PEPTIDE: CPT

## 2022-10-31 PROCEDURE — 99291 CRITICAL CARE FIRST HOUR: CPT | Performed by: INTERNAL MEDICINE

## 2022-10-31 PROCEDURE — 84484 ASSAY OF TROPONIN QUANT: CPT

## 2022-10-31 PROCEDURE — 96365 THER/PROPH/DIAG IV INF INIT: CPT

## 2022-10-31 PROCEDURE — 93005 ELECTROCARDIOGRAM TRACING: CPT | Performed by: PHYSICIAN ASSISTANT

## 2022-10-31 PROCEDURE — 6370000000 HC RX 637 (ALT 250 FOR IP): Performed by: STUDENT IN AN ORGANIZED HEALTH CARE EDUCATION/TRAINING PROGRAM

## 2022-10-31 PROCEDURE — 6360000002 HC RX W HCPCS: Performed by: EMERGENCY MEDICINE

## 2022-10-31 PROCEDURE — 96367 TX/PROPH/DG ADDL SEQ IV INF: CPT

## 2022-10-31 RX ORDER — SODIUM CHLORIDE 9 MG/ML
INJECTION, SOLUTION INTRAVENOUS PRN
Status: DISCONTINUED | OUTPATIENT
Start: 2022-10-31 | End: 2022-11-01 | Stop reason: HOSPADM

## 2022-10-31 RX ORDER — GINSENG 100 MG
1 CAPSULE ORAL 2 TIMES DAILY
Status: ON HOLD | COMMUNITY
End: 2022-11-01 | Stop reason: HOSPADM

## 2022-10-31 RX ORDER — ATORVASTATIN CALCIUM 80 MG/1
80 TABLET, FILM COATED ORAL NIGHTLY
Status: DISCONTINUED | OUTPATIENT
Start: 2022-10-31 | End: 2022-11-01 | Stop reason: HOSPADM

## 2022-10-31 RX ORDER — SODIUM CHLORIDE 0.9 % (FLUSH) 0.9 %
5-40 SYRINGE (ML) INJECTION EVERY 12 HOURS SCHEDULED
Status: DISCONTINUED | OUTPATIENT
Start: 2022-10-31 | End: 2022-11-01 | Stop reason: HOSPADM

## 2022-10-31 RX ORDER — KETOROLAC TROMETHAMINE 30 MG/ML
15 INJECTION, SOLUTION INTRAMUSCULAR; INTRAVENOUS ONCE
Status: DISCONTINUED | OUTPATIENT
Start: 2022-10-31 | End: 2022-10-31

## 2022-10-31 RX ORDER — ONDANSETRON 2 MG/ML
4 INJECTION INTRAMUSCULAR; INTRAVENOUS EVERY 6 HOURS PRN
Status: DISCONTINUED | OUTPATIENT
Start: 2022-10-31 | End: 2022-11-01 | Stop reason: HOSPADM

## 2022-10-31 RX ORDER — POLYETHYLENE GLYCOL 3350 17 G/17G
17 POWDER, FOR SOLUTION ORAL DAILY PRN
Status: DISCONTINUED | OUTPATIENT
Start: 2022-10-31 | End: 2022-11-01 | Stop reason: HOSPADM

## 2022-10-31 RX ORDER — ACETAMINOPHEN 325 MG/1
650 TABLET ORAL EVERY 4 HOURS PRN
Status: ON HOLD | COMMUNITY
End: 2022-11-01 | Stop reason: HOSPADM

## 2022-10-31 RX ORDER — ACETAMINOPHEN 650 MG/1
650 SUPPOSITORY RECTAL ONCE
Status: COMPLETED | OUTPATIENT
Start: 2022-10-31 | End: 2022-10-31

## 2022-10-31 RX ORDER — SODIUM CHLORIDE, SODIUM LACTATE, POTASSIUM CHLORIDE, CALCIUM CHLORIDE 600; 310; 30; 20 MG/100ML; MG/100ML; MG/100ML; MG/100ML
INJECTION, SOLUTION INTRAVENOUS CONTINUOUS
Status: DISCONTINUED | OUTPATIENT
Start: 2022-10-31 | End: 2022-11-01 | Stop reason: HOSPADM

## 2022-10-31 RX ORDER — 0.9 % SODIUM CHLORIDE 0.9 %
1000 INTRAVENOUS SOLUTION INTRAVENOUS ONCE
Status: COMPLETED | OUTPATIENT
Start: 2022-10-31 | End: 2022-10-31

## 2022-10-31 RX ORDER — ACETAMINOPHEN 650 MG/1
650 SUPPOSITORY RECTAL EVERY 6 HOURS PRN
Status: DISCONTINUED | OUTPATIENT
Start: 2022-10-31 | End: 2022-11-01 | Stop reason: HOSPADM

## 2022-10-31 RX ORDER — CARVEDILOL 6.25 MG/1
6.25 TABLET ORAL 2 TIMES DAILY WITH MEALS
Status: ON HOLD | COMMUNITY
End: 2022-11-01 | Stop reason: HOSPADM

## 2022-10-31 RX ORDER — ENOXAPARIN SODIUM 100 MG/ML
40 INJECTION SUBCUTANEOUS EVERY EVENING
Status: DISCONTINUED | OUTPATIENT
Start: 2022-10-31 | End: 2022-11-01 | Stop reason: HOSPADM

## 2022-10-31 RX ORDER — ACETAMINOPHEN 325 MG/1
650 TABLET ORAL EVERY 6 HOURS PRN
Status: DISCONTINUED | OUTPATIENT
Start: 2022-10-31 | End: 2022-11-01 | Stop reason: HOSPADM

## 2022-10-31 RX ORDER — ONDANSETRON 4 MG/1
4 TABLET, ORALLY DISINTEGRATING ORAL EVERY 8 HOURS PRN
Status: DISCONTINUED | OUTPATIENT
Start: 2022-10-31 | End: 2022-11-01 | Stop reason: HOSPADM

## 2022-10-31 RX ORDER — LEVETIRACETAM 100 MG/ML
1000 SOLUTION ORAL 2 TIMES DAILY
Status: DISCONTINUED | OUTPATIENT
Start: 2022-10-31 | End: 2022-11-01 | Stop reason: HOSPADM

## 2022-10-31 RX ORDER — SODIUM CHLORIDE 0.9 % (FLUSH) 0.9 %
5-40 SYRINGE (ML) INJECTION PRN
Status: DISCONTINUED | OUTPATIENT
Start: 2022-10-31 | End: 2022-11-01 | Stop reason: HOSPADM

## 2022-10-31 RX ORDER — ASPIRIN 81 MG/1
81 TABLET, CHEWABLE ORAL DAILY
Status: DISCONTINUED | OUTPATIENT
Start: 2022-10-31 | End: 2022-11-01 | Stop reason: HOSPADM

## 2022-10-31 RX ADMIN — ENOXAPARIN SODIUM 40 MG: 100 INJECTION SUBCUTANEOUS at 18:18

## 2022-10-31 RX ADMIN — SODIUM CHLORIDE 1000 ML: 9 INJECTION, SOLUTION INTRAVENOUS at 06:34

## 2022-10-31 RX ADMIN — SODIUM CHLORIDE 1000 ML: 9 INJECTION, SOLUTION INTRAVENOUS at 07:36

## 2022-10-31 RX ADMIN — LEVETIRACETAM 1000 MG: 100 SOLUTION ORAL at 15:49

## 2022-10-31 RX ADMIN — Medication 10 ML: at 19:56

## 2022-10-31 RX ADMIN — SODIUM CHLORIDE 1000 ML: 9 INJECTION, SOLUTION INTRAVENOUS at 09:08

## 2022-10-31 RX ADMIN — CEFEPIME 2000 MG: 2 INJECTION, POWDER, FOR SOLUTION INTRAVENOUS at 06:43

## 2022-10-31 RX ADMIN — ACETAMINOPHEN 650 MG: 325 TABLET ORAL at 15:02

## 2022-10-31 RX ADMIN — ATORVASTATIN CALCIUM 80 MG: 80 TABLET, FILM COATED ORAL at 19:56

## 2022-10-31 RX ADMIN — SODIUM CHLORIDE, POTASSIUM CHLORIDE, SODIUM LACTATE AND CALCIUM CHLORIDE: 600; 310; 30; 20 INJECTION, SOLUTION INTRAVENOUS at 14:59

## 2022-10-31 RX ADMIN — ASPIRIN 81 MG 81 MG: 81 TABLET ORAL at 15:02

## 2022-10-31 RX ADMIN — IOPAMIDOL 75 ML: 755 INJECTION, SOLUTION INTRAVENOUS at 09:57

## 2022-10-31 RX ADMIN — ACETAMINOPHEN 650 MG: 650 SUPPOSITORY RECTAL at 06:48

## 2022-10-31 RX ADMIN — LEVETIRACETAM 1000 MG: 100 SOLUTION ORAL at 19:55

## 2022-10-31 RX ADMIN — CEFEPIME 2000 MG: 2 INJECTION, POWDER, FOR SOLUTION INTRAVENOUS at 18:35

## 2022-10-31 RX ADMIN — Medication 5 MCG/MIN: at 09:15

## 2022-10-31 RX ADMIN — VANCOMYCIN HYDROCHLORIDE 1250 MG: 10 INJECTION, POWDER, LYOPHILIZED, FOR SOLUTION INTRAVENOUS at 08:31

## 2022-10-31 ASSESSMENT — PAIN SCALES - WONG BAKER: WONGBAKER_NUMERICALRESPONSE: 0

## 2022-10-31 ASSESSMENT — ENCOUNTER SYMPTOMS: VOMITING: 0

## 2022-10-31 ASSESSMENT — PAIN SCALES - GENERAL: PAINLEVEL_OUTOF10: 0

## 2022-10-31 NOTE — ED TRIAGE NOTES
Patient presents to ED via [facility] from [address] home for being \"unresponsive since yesterday morning\" per nursing home. Upon arrival to ED, patient on NRB, not responsive to stimuli. Per EMS, patient was supposed to be admitted to hospice today, but DNR paperwork is not signed at the moment, therefore patient is a full code. Patient's body temp noted to be extremely warm to touch, 107.4 rectally.

## 2022-10-31 NOTE — PROGRESS NOTES
Medication Reconciliation    List of medications patient is currently taking is complete. Source of information: 1. Conversation with patient/RN                                      2. EPIC records                                       3. Medication records from [facility]     Allergies  Patient has no known allergies. Notes regarding home medications:   1. Patient did not receive any home medications prior to arrival to the ER. 2. Patient is no longer on DAPT - currently on ASA monotherapy for secondary stroke prevention.     Jeri Edward RPH, PharmD, BCPS  10/31/2022 10:40 AM

## 2022-10-31 NOTE — PROGRESS NOTES
4 Eyes Skin Assessment     NAME:  Yoselyn Neville  YOB: 1965  MEDICAL RECORD NUMBER:  [id number]    The patient is being assess for  Admission    I agree that 2 RN's have performed a thorough Head to Toe Skin Assessment on the patient. ALL assessment sites listed below have been assessed. Areas assessed by both nurses:    Head, Face, Ears, Shoulders, Back, Chest, Arms, Elbows, Hands, Sacrum. Buttock, Coccyx, Ischium, and Legs. Feet and Heels        Does the Patient have a Wound? Yes wound(s) were present on assessment.  LDA wound assessment was Initiated and completed        Cedric Prevention initiated:  Yes   Wound Care Orders initiated:  Yes    Pressure Injury (Stage 3,4, Unstageable, DTI, NWPT, and Complex wounds) if present place referral/consult order under [de-identified] Yes    New and Established Ostomies if present place consult order under : NA      Nurse 1 eSignature: Electronically signed by Jason Bravo RN on 10/31/22 at 7:46 PM EDT    **SHARE this note so that the co-signing nurse is able to place an eSignature**    Nurse 2 eSignature: Electronically signed by Guerline Harris RN on 10/31/22 at 7:50 PM EDT

## 2022-10-31 NOTE — ED NOTES
Pt being transported to ICU rm 2106 at this time. Bedside report to be given to receiving ICU VICTORINO Izquierdo.      Melissa Lees RN  10/31/22 8133

## 2022-10-31 NOTE — ED PROVIDER NOTES
[address]      Pt Name: Silvestre Renteria  MRN: [id number]  [place] 1965  Date of evaluation: 10/31/2022  Provider: DAVE Tang    This patient was seen and evaluated by the attending physician Mary Alejo MD.    [address] [place] [address]       Chief Complaint   Patient presents with    Other     Unresponsive? CRITICAL CARE TIME   I performed a total Critical Care time of  31 minutes, excluding separately reportable procedures. There was a high probability of clinically significant/life threatening deterioration in the patient's condition which required my urgent intervention. Not limited to multiple reexaminations, discussions with attending physician and consultants. HISTORY OF PRESENT ILLNESS  (Location/Symptom, Timing/Onset, Context/Setting, Quality, Duration, Modifying Factors, Severity.)   Silvestre Renteria is a 62 y.o. female who presents to the emergency department via EMS from the [facility]. Per EMS patient has been unresponsive for a 24-hour period. Typically nonverbal per EMS. Per nursing home paperwork the patient has history of stroke, cognitive communication defect, seizure history on Keppra. She is unable to relay history of present illness. She was admitted to the hospital a month ago, diagnosed with severe malnutrition she has a G-tube that was placed about 2 weeks ago. Nursing Notes were reviewed and I agree. REVIEW OF SYSTEMS    (2-9 systems for level 4, 10 or more for level 5)     Review of Systems   Constitutional:  Positive for fever. Gastrointestinal:  Negative for vomiting. Neurological:  Positive for weakness. Psychiatric/Behavioral:  Positive for confusion. Unable to perform complete review of systems due to the patient's altered mental status. PAST MEDICAL HISTORY   History reviewed. No pertinent past medical history.     SURGICAL HISTORY           Procedure Laterality Date    GASTROSTOMY TUBE PLACEMENT N/A 10/13/2022    ESOPHAGOGASTRODUODENOSCOPY WITH  PERCUTANEOUS ENDOSCOPIC GASTROSTOMY  TUBE PLACEMENT performed by Jos Colón MD at [address]  10/13/2022    EGD BIOPSY performed by Jos Colón MD at [address]       Previous Medications    ACETAMINOPHEN (TYLENOL) 325 MG TABLET    Take 650 mg by mouth every 4 hours as needed for Pain    ASPIRIN 81 MG CHEWABLE TABLET    Take 81 mg by mouth daily    ATORVASTATIN (LIPITOR) 80 MG TABLET    Take 80 mg by mouth nightly    BACITRACIN 500 UNIT/GM OINTMENT    Apply 1 each topically 2 times daily Apply to right cheek and shoulder    CARVEDILOL (COREG) 6.25 MG TABLET    Take 6.25 mg by mouth 2 times daily (with meals)    FERROUS SULFATE (IRON 325) 325 (65 FE) MG TABLET    Take 325 mg by mouth every other day    LEVETIRACETAM (KEPPRA) 100 MG/ML SOLUTION    10 mLs by Per G Tube route 2 times daily    LISINOPRIL (PRINIVIL;ZESTRIL) 40 MG TABLET    Take 40 mg by mouth daily    MELATONIN 3 MG TABS TABLET    Take 6 mg by mouth at bedtime    MIRTAZAPINE (REMERON) 7.5 MG TABLET    Take 7.5 mg by mouth daily For appetite    MULTIPLE VITAMIN (MULTIVITAMIN ADULT) TABS    Take 1 tablet by mouth daily    VERAPAMIL (CALAN) 120 MG TABLET    Take 120 mg by mouth at bedtime    VITAMIN B-1 (THIAMINE) 100 MG TABLET    Take 100 mg by mouth daily    VITAMIN B-12 (CYANOCOBALAMIN) 1000 MCG TABLET    Take 1,000 mcg by mouth daily    VITAMIN D (CHOLECALCIFEROL) 25 MCG (1000 UT) TABS TABLET    Take 1,000 Units by mouth daily       ALLERGIES     Patient has no known allergies. FAMILY HISTORY     History reviewed. No pertinent family history. No family status information on file. SOCIAL HISTORY      reports that she has never smoked. She has never been exposed to tobacco smoke.  She has never used smokeless tobacco. She reports that she does not currently use alcohol. She reports that she does not currently use drugs. PHYSICAL EXAM    (up to 7 for level 4, 8 or more for level 5)     ED Triage Vitals   BP Temp Temp Source Heart Rate Resp SpO2 Height Weight   10/31/22 0615 10/31/22 0615 10/31/22 0615 10/31/22 0614 10/31/22 0614 10/31/22 0614 -- 10/31/22 0615   (!) 140/73 (!) 107.3 °F (41.8 °C) Rectal (!) 139 (!) 34 100 %  139 lb 15.9 oz (63.5 kg)       Physical Exam  Vitals and nursing note reviewed. Constitutional:       General: She is not in acute distress. Appearance: She is ill-appearing. HENT:      Head: Normocephalic and atraumatic. Mouth/Throat:      Mouth: Mucous membranes are dry. Cardiovascular:      Rate and Rhythm: Tachycardia present. Pulmonary:      Breath sounds: Wheezing and rales present. Neurological:      GCS: GCS eye subscore is 2. GCS verbal subscore is 1. GCS motor subscore is 2. Psychiatric:         Speech: She is noncommunicative. Cognition and Memory: Cognition is impaired. Memory is impaired. DIAGNOSTIC RESULTS     EKG: All EKG's are interpreted by DAVE Jaramillo in the absence of a cardiologist.    EKG interpreted by myself - please refer to attending physician's note for complete EKG interpretation:    No evidence of acute ischemia or injury. RADIOLOGY:   Non-plain film images such as CT, Ultrasound and MRI are read by the radiologist. Plain radiographic images are visualized and preliminarily interpreted by DAVE Jaramillo with the below findings:    Reviewed radiologist's interpretation. Interpretation per the Radiologist below, if available at the time of this note:    CT CHEST ABDOMEN PELVIS W CONTRAST Additional Contrast? None   Preliminary Result   Irregular 27 x 26 x 23 mm peripherally enhancing lesion in the region of the   duodenal sweep. Additional hyperattenuating lesions scattered throughout the   duodenum measuring up to 8 mm in size.   Duodenal metastatic disease is in the differential.  A primary pancreatic neoplasm is also in the differential.   Neuroendocrine primary neoplasm is in the differential.  MRI of the abdomen   with and without contrast may be helpful for further evaluation. Also would   recommend correlation with duodenal scope. Mild soft tissue thickening surrounding the origin of the SMA is also noted. This may be related to the pancreatic lesion or atherosclerotic disease. 14 x 18 mm peripancreatic lymph node and 11 mm short axis left para-aortic   lymph node in the upper abdomen are demonstrated that may represent   metastatic disease. Additional enlarged subcarinal lymph node in the chest.      No evidence of suspicious pulmonary nodule. Trace bilateral pleural effusions with bibasilar atelectasis. The findings were sent to the Radiology Results [address] at 10:28   am on 10/31/2022 to be communicated to a licensed caregiver. XR CHEST PORTABLE   Final Result   Right IJ CVC placement with its tip in the region of the cavoatrial junction. XR CHEST PORTABLE   Final Result   Increasing mild perihilar opacities centrally. Pattern may represent   vascular congestion or underlying viral infection.                LABS:  Labs Reviewed   COVID-19, RAPID - Abnormal; Notable for the following components:       Result Value    SARS-CoV-2, NAAT Indeterminate (*)     All other components within normal limits   CBC WITH AUTO DIFFERENTIAL - Abnormal; Notable for the following components:    WBC 23.7 (*)     RBC 3.62 (*)     Hemoglobin 8.3 (*)     Hematocrit 26.6 (*)     MCV 73.5 (*)     MCH 22.8 (*)     RDW 19.4 (*)     Neutrophils Absolute 22.0 (*)     Lymphocytes Absolute 0.6 (*)     Anisocytosis 1+ (*)     Poikilocytes 1+ (*)     Acanthocytes Occasional (*)     Ovalocytes Occasional (*)     All other components within normal limits   COMPREHENSIVE METABOLIC PANEL W/ REFLEX TO MG FOR LOW K - Abnormal; Notable for the following components:    Glucose 159 (*)     BUN 63 (*)     Albumin 3.1 (*)     Albumin/Globulin Ratio 0.7 (*)     Alkaline Phosphatase 183 (*)      (*)      (*)     All other components within normal limits   LIPASE - Abnormal; Notable for the following components:    Lipase 157.0 (*)     All other components within normal limits   URINALYSIS WITH REFLEX TO CULTURE - Abnormal; Notable for the following components:    Protein, UA 30 (*)     Leukocyte Esterase, Urine TRACE (*)     All other components within normal limits   LACTATE, SEPSIS - Abnormal; Notable for the following components:    Lactic Acid, Sepsis 2.2 (*)     All other components within normal limits   LEVETIRACETAM LEVEL - Abnormal; Notable for the following components:    Levetiracetam Lvl 70.7 (*)     All other components within normal limits   BRAIN NATRIURETIC PEPTIDE - Abnormal; Notable for the following components:    Pro- (*)     All other components within normal limits   RAPID INFLUENZA A/B ANTIGENS   CULTURE, BLOOD 2   CULTURE, BLOOD 1   COVID-19, RAPID   TROPONIN   LACTATE, SEPSIS   MICROSCOPIC URINALYSIS   COVID-19   POCT GLUCOSE       All other labs were within normal range or not returned as of this dictation. EMERGENCY DEPARTMENT COURSE and DIFFERENTIAL DIAGNOSIS/MDM:   Vitals:    Vitals:    10/31/22 1215 10/31/22 1230 10/31/22 1245 10/31/22 1300   BP: 96/68 95/68 97/67 96/64   Pulse: (!) 102 (!) 103 (!) 103 (!) 103   Resp: 18 17 17 17   Temp:    99.3 °F (37.4 °C)   TempSrc:    Axillary   SpO2: 100% 100% 100% 100%   Weight:       Height:         Patient is febrile, tachycardic and tachypneic on arrival.  She has saturating well on a nonrebreather. She responds to painful stimuli with opening her eyes but otherwise not communicating. Per nursing home she has not been responding normally for 24 hours. Fluids were started Tylenol ordered.   The attending physician spoke with the patient's power of  her and who advised that she would like the patient to be intubated and treated to the point of arrest and then stop treatment. I also spoke to her at the bedside when she presented to the emergency department and updated her including the findings on the CT scan concerning for pancreatic mass. I spoke to her son Kaleigh Ascencio on the phone he can be reached at [phone]. He has not really seen her in about 6 months and has not kept a close contact with her recently. He is updated of the current status and understand she is being admitted. CONSULTS:  IP CONSULT TO PHARMACY  IP CONSULT TO HOSPITALIST  PHARMACY TO DOSE VANCOMYCIN  IP CONSULT TO DIETITIAN  IP CONSULT TO PALLIATIVE CARE    PROCEDURES:  Procedures      FINAL IMPRESSION      1. Septicemia ([facility] [address]    2. Altered mental status, unspecified altered mental status type    3. Acute respiratory failure with hypoxia (HCC)    4. Abnormal CT of the abdomen          DISPOSITION/PLAN   DISPOSITION Admitted 10/31/2022 12:29:55 PM      PATIENT REFERRED TO:  No follow-up provider specified.     DISCHARGE MEDICATIONS:  New Prescriptions    No medications on file       (Please note that portions of this note were completed with a voice recognition program.  Efforts were made to edit the dictations but occasionally words are mis-transcribed.)    Jake Mueller [place]  10/31/22 4856

## 2022-10-31 NOTE — PROGRESS NOTES
4 Eyes Skin Assessment     NAME:  Yoselyn Neville  YOB: 1965  MEDICAL RECORD NUMBER:  [id number]    The patient is being assess for  Shift Handoff    I agree that 2 RN's have performed a thorough Head to Toe Skin Assessment on the patient. ALL assessment sites listed below have been assessed. Areas assessed by both nurses:    Head, Face, Ears, Shoulders, Back, Chest, Arms, Elbows, Hands, Sacrum. Buttock, Coccyx, Ischium, and Legs. Feet and Heels        Does the Patient have a Wound? Yes wound(s) were present on assessment.  LDA wound assessment was Initiated and completed        Cedric Prevention initiated:  Yes   Wound Care Orders initiated:  Yes    Pressure Injury (Stage 3,4, Unstageable, DTI, NWPT, and Complex wounds) if present place referral/consult order under [de-identified] Yes    New and Established Ostomies if present place consult order under : NA      Nurse 1 eSignature: Electronically signed by Lurdes Arevalo RN on 10/31/22 at 7:47 PM EDT    **SHARE this note so that the co-signing nurse is able to place an eSignature**    Nurse 2 eSignature: Electronically signed by VICTORINO Johnson on 10/31/22 at 9:09 PM EDT

## 2022-10-31 NOTE — H&P
Hospital Medicine History & Physical      PCP: Beryl Mariano MD    Date of Admission: 10/31/2022    Date of Service: Pt seen/examined on 10/31/22   and Admitted to Inpatient. Chief Complaint:  AMS       History Of Present Illness: The patient is a 62 y.o. female who presents to [facility] with AMS. With a past medical history of developmental delay, seizure disorder, strokes, sickle cell disease, resides at [facility]   ([facility] )  at baseline with limited communication, patient reportedly had been noted to have worsening level of consciousness in the past 1 to 2 days as well as fever. Patient does not participate in conversation however patient's niece was at bedside who helped aid with history. Patient was also noted to have worsening intake with a PEG inserted around 2 weeks ago. Upon presentation to emergency patient was in septic shock, febrile to 41.8, tachycardic to 135 and hypotensive requiring norepinephrine. Past Medical History:    History reviewed. No pertinent past medical history. Past Surgical History:        Procedure Laterality Date    GASTROSTOMY TUBE PLACEMENT N/A 10/13/2022    ESOPHAGOGASTRODUODENOSCOPY WITH  PERCUTANEOUS ENDOSCOPIC GASTROSTOMY  TUBE PLACEMENT performed by Sukhdeep Dahl MD at [place] [id number]  10/13/2022    EGD BIOPSY performed by Sukhdeep Dahl MD at [address] [place] [address]       Medications Prior to Admission:    Prior to Admission medications    Medication Sig Start Date End Date Taking?  Authorizing Provider   acetaminophen (TYLENOL) 325 MG tablet Take 650 mg by mouth every 4 hours as needed for Pain   Yes Historical Provider, MD   bacitracin 500 UNIT/GM ointment Apply 1 each topically 2 times daily Apply to right cheek and shoulder   Yes Historical Provider, MD   carvedilol (COREG) 6.25 MG tablet Take 6.25 mg by mouth 2 times daily (with meals)   Yes Historical Provider, MD   levETIRAcetam (KEPPRA) 100 MG/ML solution 10 mLs by Per G Tube route 2 times daily 10/17/22 11/16/22  Rolan Castleman, MD   aspirin 81 MG chewable tablet Take 81 mg by mouth daily    Historical Provider, MD   atorvastatin (LIPITOR) 80 MG tablet Take 80 mg by mouth nightly    Historical Provider, MD   vitamin D (CHOLECALCIFEROL) 25 MCG (1000 UT) TABS tablet Take 1,000 Units by mouth daily    Historical Provider, MD   vitamin B-12 (CYANOCOBALAMIN) 1000 MCG tablet Take 1,000 mcg by mouth daily    Historical Provider, MD   ferrous sulfate (IRON 325) 325 (65 Fe) MG tablet Take 325 mg by mouth every other day    Historical Provider, MD   lisinopril (PRINIVIL;ZESTRIL) 40 MG tablet Take 40 mg by mouth daily    Historical Provider, MD   melatonin 3 MG TABS tablet Take 6 mg by mouth at bedtime    Historical Provider, MD   Multiple Vitamin (MULTIVITAMIN ADULT) TABS Take 1 tablet by mouth daily    Historical Provider, MD   mirtazapine (REMERON) 7.5 MG tablet Take 7.5 mg by mouth daily For appetite    Historical Provider, MD   vitamin B-1 (THIAMINE) 100 MG tablet Take 100 mg by mouth daily    Historical Provider, MD   verapamil (CALAN) 120 MG tablet Take 120 mg by mouth at bedtime    Historical Provider, MD       Allergies:  Patient has no known allergies. Social History:  The patient currently lives at [address] [place] [address]:   reports that she has never smoked. She has never been exposed to tobacco smoke. She has never used smokeless tobacco.  ETOH:   reports that she does not currently use alcohol. Family History:  Reviewed in detail and negative for DM, Early CAD, Cancer, CVA. Positive as follows:    History reviewed. No pertinent family history. REVIEW OF SYSTEMS:   could not be assessed.      PHYSICAL EXAM:    BP 99/60   Pulse (!) 105   Temp (!) 102.8 °F (39.3 °C) (Rectal)   Resp 15   Ht 5' 1\" Septic shock ([address] [A41.9, R65.21] 10/31/2022     Priority: Medium         PHYSICIANS CERTIFICATION:    I certify that Isaias Archer is expected to be hospitalized for more  than 2 midnights based on the following assessment and plan:      ASSESSMENT/PLAN:    Septic shock  Patient presented to emergency with fever up to 41, tachycardia 135 and hypotension 95/57, initially hydrated with 3 L of normal saline, which did not help to improve her blood pressure and was later throughout started on Levophed. Plan  -Cefepime and vancomycin  -Wean down pressors  -Blood culture sent    Developmental delay  Resides at nursing home and baseline, on G-tube feeding that was recently started. Newly found abdominal mass  CT abdomen showed an irregular enhancing lesion in the duodenal area, with worrisome for possible pancreatic primary versus neuroendocrine, with lymph nodes that may represent metastasis. I discussed above with the patient's niece who is the patient's power of . She wants CODE STATUS to be DNR CCA and she will discuss with her family the next modes of action however she stated that she would likely not pursue aggressive measures however once the infection treated for now. Seizure disorder  Continue Keppra    Hypertension  Hold home antihypertensive medication given hypotension    History of stroke  Continue aspirin    Transaminitis  We will continue to follow    DVT Prophylaxis: lovenox  Diet: No diet orders on file  Code Status: DNR-CCA  PT/OT Eval Status: baseline     Dispo - pending clinical improvement         Donell Izquierdo MD    Thank you Ifrah Angel MD for the opportunity to be involved in this patient's care. If you have any questions or concerns please feel free to contact me at [phone]. Due to the immediate potential for life-threatening deterioration due to septic shock, I spent 31 minutes providing critical care.   This time is excluding time spent performing procedures.

## 2022-10-31 NOTE — CARE COORDINATION
10/31/22 1444   Readmission Assessment   Number of Days since last admission? 8-30 days   Previous Disposition Long Term Care  (Long term care bed at [facility])   Who is being Tor Robertson   (chart reviewed.)   Did you visit your Primary Care Physician after you left the hospital, before you returned this time? No   Why weren't you able to visit your PCP? Did not have an appointment   Did you see a specialist, such as Cardiac, Pulmonary, Orthopedic Physician, etc. after you left the hospital? Yes  (Cardiology)   Who advised the patient to return to the hospital? Skilled Unit   Does the patient report anything that got in the way of taking their medications?  No

## 2022-10-31 NOTE — CONSULTS
Pulmonary Critical Care Consult Note     Patient's name:  [address] [place] [address] Record Number: [id number]  Patient's account/billing number: [de-identified]  Patient's YOB: 1965  Age: 62 y.o. Date of Admission: 10/31/2022  6:10 AM  Date of Consult: 10/31/2022      Primary Care Physician: Ana Stoddard MD      Code Status: DNR-CCA    Reason for consult: Septic shock. Assessment and Plan     Septic shock  Abdominal mass ? Cancer  Metabolic encephalopathy   Transaminitis   H/o seizure disorder     Plan:  Panculture  Broad spectrum antibiotics  Pressors to keep MAP > 65  Gentle hydration   Aspiration precautions  Seizure precautions  GI and dvt prophylaxis  Check ABG and ammonia   Check EEG       HISTORY OF PRESENT ILLNESS:   Mr./Ms. Sim Esparza is a 62 y.o. -American lady nursing home resident history came from medical record patient is unresponsive currently, she is verbally interactive at baseline, recently discharged from [facility] after admission with hyponatremia and altered mental status  She has a past medical history significant for sickle cell disease, strokes, seizure disorder, developmental delay. Presented with fever hypotension and tachycardia. CT abdomen showed irregular 27 x 23 mm peripheral enhancing lesion in the region of the duodenal suspicious for cancer. Chest portion with trace bilateral pleural effusion and some bibasilar airspace disease more on the left. Flu negative. COVID-indeterminate      Past Medical History:  History reviewed. No pertinent past medical history.     Past Surgical History:        Procedure Laterality Date    GASTROSTOMY TUBE PLACEMENT N/A 10/13/2022    ESOPHAGOGASTRODUODENOSCOPY WITH  PERCUTANEOUS ENDOSCOPIC GASTROSTOMY  TUBE PLACEMENT performed by Leonor Choi MD at [place] [address]  10/13/2022    EGD BIOPSY performed by Nabeel Irizarry Henrietta More MD at [address] [place] [address] Ne:    No Known Allergies      Home Meds:   Prior to Admission medications    Medication Sig Start Date End Date Taking? Authorizing Provider   acetaminophen (TYLENOL) 325 MG tablet Take 650 mg by mouth every 4 hours as needed for Pain   Yes Historical Provider, MD   bacitracin 500 UNIT/GM ointment Apply 1 each topically 2 times daily Apply to right cheek and shoulder   Yes Historical Provider, MD   carvedilol (COREG) 6.25 MG tablet Take 6.25 mg by mouth 2 times daily (with meals)   Yes Historical Provider, MD   levETIRAcetam (KEPPRA) 100 MG/ML solution 10 mLs by Per G Tube route 2 times daily 10/17/22 11/16/22  Asia Badillo MD   aspirin 81 MG chewable tablet Take 81 mg by mouth daily    Historical Provider, MD   atorvastatin (LIPITOR) 80 MG tablet Take 80 mg by mouth nightly    Historical Provider, MD   vitamin D (CHOLECALCIFEROL) 25 MCG (1000 UT) TABS tablet Take 1,000 Units by mouth daily    Historical Provider, MD   vitamin B-12 (CYANOCOBALAMIN) 1000 MCG tablet Take 1,000 mcg by mouth daily    Historical Provider, MD   ferrous sulfate (IRON 325) 325 (65 Fe) MG tablet Take 325 mg by mouth every other day    Historical Provider, MD   lisinopril (PRINIVIL;ZESTRIL) 40 MG tablet Take 40 mg by mouth daily    Historical Provider, MD   melatonin 3 MG TABS tablet Take 6 mg by mouth at bedtime    Historical Provider, MD   Multiple Vitamin (MULTIVITAMIN ADULT) TABS Take 1 tablet by mouth daily    Historical Provider, MD   mirtazapine (REMERON) 7.5 MG tablet Take 7.5 mg by mouth daily For appetite    Historical Provider, MD   vitamin B-1 (THIAMINE) 100 MG tablet Take 100 mg by mouth daily    Historical Provider, MD   verapamil (CALAN) 120 MG tablet Take 120 mg by mouth at bedtime    Historical Provider, MD       Family History:   History reviewed. No pertinent family history. Social History:   TOBACCO:   reports that she has never smoked.  She has never been exposed to tobacco smoke. She has never used smokeless tobacco.  ETOH:   reports that she does not currently use alcohol. DRUGS:  reports that she does not currently use drugs. REVIEW OF SYSTEMS:  Review of Systems -   Unable to obtain patient unresponsive. Physical Exam:    Vitals: BP 99/73   Pulse (!) 104   Temp 100.4 °F (38 °C) (Rectal)   Resp 18   Ht 5' 1\" (1.549 m)   Wt 139 lb 15.9 oz (63.5 kg)   SpO2 100%   BMI 26.45 kg/m²     Last Body weight:   Wt Readings from Last 3 Encounters:   10/31/22 139 lb 15.9 oz (63.5 kg)   10/13/22 143 lb 15.4 oz (65.3 kg)       Body Mass Index : Body mass index is 26.45 kg/m². Intake and Output summary: No intake or output data in the 24 hours ending 10/31/22 1419    Physical Examination:     PHYSICAL EXAM:    Gen: Moderate acute distress. Eyes: PERRL. Anicteric sclera. No conjunctival injection. ENT: No discharge. Posterior oropharynx clear. External appearance of ears and nose normal.  Neck: Trachea midline. No mass, no lymphadenopathy    Resp: diminished with no wheezing   CV: Regular rate. Regular rhythm. No murmur or rub. No edema. GI: Soft, Non-tender. Non-distended. +BS, PEG in place   Skin: Warm, dry, w/o erythema. Lymph: No cervical or supraclavicular LAD. M/S: No cyanosis. No clubbing. Neuro:  unresponsive, no focal deficit no seizure activities       Laboratory findings:-    CBC:   Recent Labs     10/31/22  0632   WBC 23.7*   HGB 8.3*        BMP:    Recent Labs     10/31/22  0632      K 4.7      CO2 22   BUN 63*   CREATININE 0.9   GLUCOSE 159*     S. Calcium:  Recent Labs     10/31/22  0632   CALCIUM 8.8       Hepatic functions:   Recent Labs     10/31/22  0632   ALKPHOS 183*   *   *   PROT 7.6   BILITOT 0.5   LABALBU 3.1*       Cardiac enzymes:  Recent Labs     10/31/22  6908   TROPONINI <0.01         Radiology Review:  Pertinent images / reports were reviewed as a part of this visit.         CTPA: Results for orders placed during the hospital encounter of 09/29/22    CT CHEST PULMONARY EMBOLISM W CONTRAST    Narrative  EXAMINATION:  CTA OF THE CHEST, 9/29/2022 8:06 pm    TECHNIQUE:  CTA of the chest was performed after the administration of intravenous  contrast.  Multiplanar reformatted images are provided for review. MIP  images are provided for review. Automated exposure control, iterative  reconstruction, and/or weight based adjustment of the mA/kV was utilized to  reduce the radiation dose to as low as reasonably achievable. COMPARISON:  None    HISTORY:  ORDERING SYSTEM PROVIDED HISTORY:  Tachycardia, altered mental status,  history of autoimmune disease. TECHNOLOGIST PROVIDED HISTORY:  Reason for Exam:  Tachycardia, altered mental status, history of autoimmune  disease. Decision Support Exception - unselect if not a suspected or confirmed  emergency medical condition->Emergency Medical Condition (MA)  Reason for Exam:  Tachycardia, altered mental status, history of autoimmune  disease. FINDINGS:  Pulmonary Arteries: Pulmonary arteries are adequately opacified for  evaluation. No evidence of intraluminal filling defect to suggest pulmonary  embolism. Main pulmonary artery is normal in caliber. Mediastinum: No evidence of mediastinal lymphadenopathy. The heart is  prominent but no pericardial disease. There is no acute abnormality of the  thoracic aorta. Lungs/pleura: The lungs are without acute process. No focal consolidation or  pulmonary edema. Left lower lobe atelectatic changes and mild loss of  volume. No evidence of pleural effusion or pneumothorax. Upper Abdomen: Limited images of the upper abdomen are unremarkable. Soft Tissues/Bones: No acute bone or soft tissue abnormality. Impression  No evidence of pulmonary embolism or acute pulmonary abnormality. Mild left  lower lobe atelectatic changes. CXR PA/LAT: No results found for this or any previous visit.       CXR portable: Results for orders placed during the hospital encounter of 10/31/22    XR CHEST PORTABLE    Narrative  EXAMINATION:  ONE XRAY VIEW OF THE CHEST    10/31/2022 8:57 am    COMPARISON:  10/31/2022 0611 hours    HISTORY:  ORDERING SYSTEM PROVIDED HISTORY: Right IG CVC  TECHNOLOGIST PROVIDED HISTORY:  Reason for exam:->Right IG CVC  Reason for Exam: Right IG CVC    FINDINGS:  A right IJ CVC is been placed with its tip in the region of the cavoatrial  junction. There is no evidence of pneumothorax. Heart and lungs are  unchanged from the prior exam.  A linear density in the left lung base is  consistent with a disc of atelectasis or scar. Impression  Right IJ CVC placement with its tip in the region of the cavoatrial junction.          Critical Care time 60 minutes       Kike Smith MD [facility]ARBELLA.            10/31/2022, 2:19 PM

## 2022-10-31 NOTE — CARE COORDINATION
Chart reviewed. COVID +  From [facility]. Confirmed this with Cholosukumar Carlson [phone] rep for [place]. She does not need a prior auth to return. Completed Readmission Assessment Screen. Pt is non verbal at baseline/ developmental delya, sickle cell. Following for DC disposition.   Brooklynn Chen [place]     Case Management   [phone]    10/31/2022  2:46 PM

## 2022-10-31 NOTE — CONSULTS
Nutrition Note    Consult \"TF ordering and management\". Full RD note to follow tomorrow. Jevity 1.5 with goal rate of 50 mL/hr. Flush per provider. TF regimen provides: 1000 mL TV, 1500 kcal, 64 gm pro, 760 mL free water.     Electronically signed by Lula Rosario, YUO, LD on 10/31/22 at 1:59 PM EDT    Contact: [phone]

## 2022-11-01 VITALS
RESPIRATION RATE: 14 BRPM | OXYGEN SATURATION: 100 % | DIASTOLIC BLOOD PRESSURE: 94 MMHG | SYSTOLIC BLOOD PRESSURE: 132 MMHG | TEMPERATURE: 97.4 F | WEIGHT: 140.65 LBS | HEIGHT: 61 IN | HEART RATE: 95 BPM | BODY MASS INDEX: 26.56 KG/M2

## 2022-11-01 LAB
ANION GAP SERPL CALCULATED.3IONS-SCNC: 9 MMOL/L (ref 3–16)
BASOPHILS ABSOLUTE: 0 K/UL (ref 0–0.2)
BASOPHILS RELATIVE PERCENT: 0.5 %
BUN BLDV-MCNC: 42 MG/DL (ref 7–20)
CALCIUM SERPL-MCNC: 8.2 MG/DL (ref 8.3–10.6)
CHLORIDE BLD-SCNC: 111 MMOL/L (ref 99–110)
CO2: 24 MMOL/L (ref 21–32)
CREAT SERPL-MCNC: <0.5 MG/DL (ref 0.6–1.1)
EOSINOPHILS ABSOLUTE: 0.1 K/UL (ref 0–0.6)
EOSINOPHILS RELATIVE PERCENT: 0.6 %
GFR SERPL CREATININE-BSD FRML MDRD: >60 ML/MIN/{1.73_M2}
GLUCOSE BLD-MCNC: 125 MG/DL (ref 70–99)
GLUCOSE BLD-MCNC: 129 MG/DL (ref 70–99)
GLUCOSE BLD-MCNC: 140 MG/DL (ref 70–99)
HCT VFR BLD CALC: 25 % (ref 36–48)
HEMOGLOBIN: 7.8 G/DL (ref 12–16)
LYMPHOCYTES ABSOLUTE: 0.9 K/UL (ref 1–5.1)
LYMPHOCYTES RELATIVE PERCENT: 10.1 %
MAGNESIUM: 2.6 MG/DL (ref 1.8–2.4)
MCH RBC QN AUTO: 23.9 PG (ref 26–34)
MCHC RBC AUTO-ENTMCNC: 31.2 G/DL (ref 31–36)
MCV RBC AUTO: 76.5 FL (ref 80–100)
MONOCYTES ABSOLUTE: 0.4 K/UL (ref 0–1.3)
MONOCYTES RELATIVE PERCENT: 4.9 %
NEUTROPHILS ABSOLUTE: 7.5 K/UL (ref 1.7–7.7)
NEUTROPHILS RELATIVE PERCENT: 83.9 %
PDW BLD-RTO: 20.5 % (ref 12.4–15.4)
PERFORMED ON: ABNORMAL
PERFORMED ON: ABNORMAL
PHOSPHORUS: 2.6 MG/DL (ref 2.5–4.9)
PLATELET # BLD: 163 K/UL (ref 135–450)
PMV BLD AUTO: 10.3 FL (ref 5–10.5)
POTASSIUM SERPL-SCNC: 3.6 MMOL/L (ref 3.5–5.1)
RBC # BLD: 3.26 M/UL (ref 4–5.2)
SODIUM BLD-SCNC: 144 MMOL/L (ref 136–145)
VANCOMYCIN RANDOM: 8.7 UG/ML
WBC # BLD: 9 K/UL (ref 4–11)

## 2022-11-01 PROCEDURE — 94760 N-INVAS EAR/PLS OXIMETRY 1: CPT

## 2022-11-01 PROCEDURE — 99291 CRITICAL CARE FIRST HOUR: CPT | Performed by: INTERNAL MEDICINE

## 2022-11-01 PROCEDURE — 80048 BASIC METABOLIC PNL TOTAL CA: CPT

## 2022-11-01 PROCEDURE — 95819 EEG AWAKE AND ASLEEP: CPT

## 2022-11-01 PROCEDURE — 2700000000 HC OXYGEN THERAPY PER DAY

## 2022-11-01 PROCEDURE — 2500000003 HC RX 250 WO HCPCS: Performed by: NURSE PRACTITIONER

## 2022-11-01 PROCEDURE — 84100 ASSAY OF PHOSPHORUS: CPT

## 2022-11-01 PROCEDURE — 83735 ASSAY OF MAGNESIUM: CPT

## 2022-11-01 PROCEDURE — 2580000003 HC RX 258: Performed by: STUDENT IN AN ORGANIZED HEALTH CARE EDUCATION/TRAINING PROGRAM

## 2022-11-01 PROCEDURE — 85025 COMPLETE CBC W/AUTO DIFF WBC: CPT

## 2022-11-01 PROCEDURE — 80202 ASSAY OF VANCOMYCIN: CPT

## 2022-11-01 PROCEDURE — 6370000000 HC RX 637 (ALT 250 FOR IP): Performed by: STUDENT IN AN ORGANIZED HEALTH CARE EDUCATION/TRAINING PROGRAM

## 2022-11-01 PROCEDURE — 2580000003 HC RX 258: Performed by: NURSE PRACTITIONER

## 2022-11-01 PROCEDURE — 6360000002 HC RX W HCPCS: Performed by: STUDENT IN AN ORGANIZED HEALTH CARE EDUCATION/TRAINING PROGRAM

## 2022-11-01 PROCEDURE — APPNB15 APP NON BILLABLE TIME 0-15 MINS: Performed by: NURSE PRACTITIONER

## 2022-11-01 PROCEDURE — 2500000003 HC RX 250 WO HCPCS

## 2022-11-01 PROCEDURE — 6360000002 HC RX W HCPCS: Performed by: NURSE PRACTITIONER

## 2022-11-01 RX ORDER — POTASSIUM CHLORIDE 29.8 MG/ML
20 INJECTION INTRAVENOUS ONCE
Status: COMPLETED | OUTPATIENT
Start: 2022-11-01 | End: 2022-11-01

## 2022-11-01 RX ORDER — MORPHINE SULFATE 100 MG/5ML
2 SOLUTION, CONCENTRATE ORAL 4 TIMES DAILY PRN
Qty: 30 ML | Refills: 0 | Status: SHIPPED | OUTPATIENT
Start: 2022-11-01 | End: 2022-11-20

## 2022-11-01 RX ORDER — CALCIUM GLUCONATE 20 MG/ML
1000 INJECTION, SOLUTION INTRAVENOUS ONCE
Status: COMPLETED | OUTPATIENT
Start: 2022-11-01 | End: 2022-11-01

## 2022-11-01 RX ORDER — LORAZEPAM 0.5 MG/1
0.5 TABLET ORAL EVERY 8 HOURS PRN
Qty: 12 TABLET | Refills: 0 | Status: SHIPPED | OUTPATIENT
Start: 2022-11-01 | End: 2022-11-04

## 2022-11-01 RX ADMIN — SODIUM CHLORIDE, POTASSIUM CHLORIDE, SODIUM LACTATE AND CALCIUM CHLORIDE: 600; 310; 30; 20 INJECTION, SOLUTION INTRAVENOUS at 04:24

## 2022-11-01 RX ADMIN — SODIUM CHLORIDE 5 ML/HR: 9 INJECTION, SOLUTION INTRAVENOUS at 08:46

## 2022-11-01 RX ADMIN — CALCIUM GLUCONATE 1000 MG: 20 INJECTION, SOLUTION INTRAVENOUS at 10:38

## 2022-11-01 RX ADMIN — LEVETIRACETAM 1000 MG: 100 SOLUTION ORAL at 08:55

## 2022-11-01 RX ADMIN — VANCOMYCIN HYDROCHLORIDE 1000 MG: 1 INJECTION, POWDER, LYOPHILIZED, FOR SOLUTION INTRAVENOUS at 10:40

## 2022-11-01 RX ADMIN — ASPIRIN 81 MG 81 MG: 81 TABLET ORAL at 08:53

## 2022-11-01 RX ADMIN — Medication 10 ML: at 08:54

## 2022-11-01 RX ADMIN — Medication: at 12:51

## 2022-11-01 RX ADMIN — POTASSIUM CHLORIDE 20 MEQ: 29.8 INJECTION, SOLUTION INTRAVENOUS at 08:53

## 2022-11-01 RX ADMIN — CEFEPIME 2000 MG: 2 INJECTION, POWDER, FOR SOLUTION INTRAVENOUS at 06:10

## 2022-11-01 ASSESSMENT — PAIN SCALES - GENERAL
PAINLEVEL_OUTOF10: 0

## 2022-11-01 ASSESSMENT — PAIN SCALES - WONG BAKER: WONGBAKER_NUMERICALRESPONSE: 0

## 2022-11-01 NOTE — PLAN OF CARE
Problem: Discharge Planning  Goal: Discharge to home or other facility with appropriate resources  10/31/2022 2144 by Radha Sullivan RN  Outcome: Progressing  10/31/2022 1948 by Tisha Mcclellan RN  Outcome: Progressing  Flowsheets (Taken 10/31/2022 1930 by Radha Sullivan RN)  Discharge to home or other facility with appropriate resources:   Identify barriers to discharge with patient and caregiver   Arrange for needed discharge resources and transportation as appropriate   Identify discharge learning needs (meds, wound care, etc)   Refer to discharge planning if patient needs post-hospital services based on physician order or complex needs related to functional status, cognitive ability or social support system     Problem: Pain  Goal: Verbalizes/displays adequate comfort level or baseline comfort level  10/31/2022 2144 by Radha Sullivan RN  Outcome: Progressing  10/31/2022 1948 by Tisha Mcclellan RN  Outcome: Progressing  Flowsheets (Taken 10/31/2022 1930 by Radha Sullivan RN)  Verbalizes/displays adequate comfort level or baseline comfort level:   Assess pain using appropriate pain scale   Administer analgesics based on type and severity of pain and evaluate response   Implement non-pharmacological measures as appropriate and evaluate response   Notify Licensed Independent Practitioner if interventions unsuccessful or patient reports new pain     Problem: Skin/Tissue Integrity  Goal: Absence of new skin breakdown  Description: 1. Monitor for areas of redness and/or skin breakdown  2. Assess vascular access sites hourly  3. Every 4-6 hours minimum:  Change oxygen saturation probe site  4. Every 4-6 hours:  If on nasal continuous positive airway pressure, respiratory therapy assess nares and determine need for appliance change or resting period.   Outcome: Progressing     Problem: Safety - Adult  Goal: Free from fall injury  Outcome: Progressing  Flowsheets (Taken 10/31/2022 1930)  Free From Fall Injury: Instruct family/caregiver on patient safety

## 2022-11-01 NOTE — PROGRESS NOTES
Patients right IJ CVC removed and PIV removed. Patient to go to [address] [place] [address] with nuno and g-tube only. Transportation at bedside. All questions answered. VSS.

## 2022-11-01 NOTE — CARE COORDINATION
[facility] Nurse  Consult Note       NAME:  Regi Elliott  MEDICAL RECORD NUMBER:  [id number]  AGE: 62 y.o. GENDER: female  : 1965  TODAY'S DATE:  2022    Subjective   Reason for WOCN Evaluation and Assessment: Multiple wounds on admit      Regi Elliott is a 62 y.o. female referred by:   [] Physician  [x] Nursing  [] Other:     Wound Identification:  Wound Type: pressure, MASD  Contributing Factors: chronic pressure, decreased mobility, incontinence of stool, and incontinence of urine    Wound History: All wounds POA. Pt from NH. Peg tube with TF. Current Wound Care Treatment:  N/A    Patient Goal of Care:  [x] Wound Healing  [] Odor Control  [] Palliative Care  [] Pain Control   [] Other:         PAST MEDICAL HISTORY    History reviewed. No pertinent past medical history. PAST SURGICAL HISTORY    Past Surgical History:   Procedure Laterality Date    GASTROSTOMY TUBE PLACEMENT N/A 10/13/2022    ESOPHAGOGASTRODUODENOSCOPY WITH  PERCUTANEOUS ENDOSCOPIC GASTROSTOMY  TUBE PLACEMENT performed by Ruth Ann Calvo MD at [address] [place] [address]  10/13/2022    EGD BIOPSY performed by Ruth Ann Calvo MD at [address] [place] [address]    History reviewed. No pertinent family history. SOCIAL HISTORY    Social History     Tobacco Use    Smoking status: Never     Passive exposure: Never    Smokeless tobacco: Never   Vaping Use    Vaping Use: Never used   Substance Use Topics    Alcohol use: Not Currently    Drug use: Not Currently       ALLERGIES    No Known Allergies    MEDICATIONS    No current facility-administered medications on file prior to encounter.      Current Outpatient Medications on File Prior to Encounter   Medication Sig Dispense Refill    acetaminophen (TYLENOL) 325 MG tablet Take 650 mg by mouth every 4 hours as needed for Pain      bacitracin 500 UNIT/GM ointment Apply 1 each topically 2 times daily Apply to right cheek and shoulder      carvedilol (COREG) 6.25 MG tablet Take 6.25 mg by mouth 2 times daily (with meals)      levETIRAcetam (KEPPRA) 100 MG/ML solution 10 mLs by Per G Tube route 2 times daily 600 mL 0    aspirin 81 MG chewable tablet Take 81 mg by mouth daily      atorvastatin (LIPITOR) 80 MG tablet Take 80 mg by mouth nightly      vitamin D (CHOLECALCIFEROL) 25 MCG (1000 UT) TABS tablet Take 1,000 Units by mouth daily      vitamin B-12 (CYANOCOBALAMIN) 1000 MCG tablet Take 1,000 mcg by mouth daily      ferrous sulfate (IRON 325) 325 (65 Fe) MG tablet Take 325 mg by mouth every other day      lisinopril (PRINIVIL;ZESTRIL) 40 MG tablet Take 40 mg by mouth daily      melatonin 3 MG TABS tablet Take 6 mg by mouth at bedtime      Multiple Vitamin (MULTIVITAMIN ADULT) TABS Take 1 tablet by mouth daily      mirtazapine (REMERON) 7.5 MG tablet Take 7.5 mg by mouth daily For appetite      vitamin B-1 (THIAMINE) 100 MG tablet Take 100 mg by mouth daily      verapamil (CALAN) 120 MG tablet Take 120 mg by mouth at bedtime         Objective    /84   Pulse 73   Temp 97.8 °F (36.6 °C) (Rectal)   Resp 13   Ht 5' 1\" (1.549 m)   Wt 140 lb 10.5 oz (63.8 kg)   SpO2 100%   BMI 26.58 kg/m²     LABS:  WBC:    Lab Results   Component Value Date/Time    WBC 9.0 11/01/2022 04:32 AM     H/H:    Lab Results   Component Value Date/Time    HGB 7.8 11/01/2022 04:32 AM    HCT 25.0 11/01/2022 04:32 AM     PTT:  No results found for: APTT, PTT[APTT}  PT/INR:  No results found for: PROTIME, INR  HgBA1c:    Lab Results   Component Value Date/Time    LABA1C 5.4 10/07/2022 05:55 AM       Assessment   Cedric Risk Score: Cedric Scale Score: 13    Patient Active Problem List   Diagnosis Code    Altered mental status R41.82    Hypernatremia E87.0    Hypokalemia E87.6    GARRETT (acute kidney injury) ([facility] [address] N17.9    Severe malnutrition (HCC) E43    Acute encephalopathy G93.40    AMS (altered mental status) R41.82    Septicemia (HCC) A41.9 Measurements:  Wound 10/31/22 Heel Anterior;Right (Active)   Wound Image   11/01/22 1200   Wound Etiology Deep tissue/Injury 11/01/22 1200   Wound Cleansed Not Cleansed 11/01/22 0415   Dressing/Treatment Barrier film 11/01/22 1200   Wound Length (cm) 3 cm 11/01/22 1200   Wound Width (cm) 4 cm 11/01/22 1200   Wound Depth (cm) 0 cm 11/01/22 1200   Wound Surface Area (cm^2) 12 cm^2 11/01/22 1200   Wound Volume (cm^3) 0 cm^3 11/01/22 1200   Wound Assessment Purple/maroon 11/01/22 1200   Drainage Amount None 11/01/22 0415   Odor None 11/01/22 0415   Carmen-wound Assessment Intact;Dry/flaky 11/01/22 1200   Number of days: 0       Wound 10/31/22 Ear Right (Active)   Wound Cleansed Not Cleansed 11/01/22 0415   Wound Assessment Dry 11/01/22 0415   Drainage Amount None 11/01/22 0415   Odor None 11/01/22 0415   Number of days: 0     R HEEL      Pt in bed. Bedbound, with AMS baseline. Pt has MASD to buttock area. Pt with evolving DTI to R heel. Specialty bed ordered. Plan   Plan of Care:   MASD: triad barrier 2x daily, BLUE BARRIER WIPES ONLY, NO BRIEFS  R and L heel: barrier film q shift, offload with heel protectors  -turn and reposition every 2 hours  Will continue to follow.     Specialty Bed Required : Yes   [x] Low Air Loss   [] Pressure Redistribution  [] Fluid Immersion  [] Bariatric  [] Total Pressure Relief  [] Other:     Current Diet: Diet NPO  ADULT TUBE FEEDING; PEG; Standard with Fiber; Continuous; 25; Yes; 25; Q 4 hours; 50; 30; Q 4 hours  Dietician consult:  Yes    Discharge Plan:  Placement for patient upon discharge: skilled nursing    Patient appropriate for Outpatient [address]: No    Referrals:  []   [] [facility]  [] Supplies  [] Other    Patient/Caregiver Teaching:  Level of patient/caregiver understanding able to:   [] Indicates understanding       [] Needs reinforcement  [] Unsuccessful      [x] Verbal Understanding  [] Demonstrated understanding       [] No evidence of learning  [] Refused teaching         [] N/A       Electronically signed by Zeferino Bee RN, CWOCN on 11/1/2022 at 12:14 PM

## 2022-11-01 NOTE — PROCEDURES
[facility]           [address]                          ELECTROENCEPHALOGRAM REPORT    PATIENT NAME: Ofe Leong                 :        1965  MED REC NO:   [id number]                          ROOM:       2106  ACCOUNT NO:   [de-identified]                           ADMIT DATE: 10/31/2022  PROVIDER:     Chelsea Anderson DO    DATE OF EE2022    REFERRING PROVIDER:  Filiberot Ortez NP    REASON FOR STUDY:  History of seizures. BRIEF HISTORY AND NEUROLOGIC FINDINGS:  The patient is a 51-year-old  female being evaluated for reported history of seizures. MEDICATIONS:  The patient's centrally acting medications listed include  Keppra. EEG FINDINGS:  This is a 20-channel digital EEG performed utilizing  bipolar and referential montages. Wakefulness and sleep were obtained  during the recording. During maximal wakefulness, there was a  low-to-moderate voltage, relatively symmetric posterior background  consisting of predominantly theta frequencies with a maximum of about  6-7 Hz. Superimposed delta activity was noted at times during the  recording. The anterior background consisted of significant artifact on  the right and a low-to-moderate voltage mixed frequencies on the left. Frequent left mid temporal sharp waves were also noted during the  recording. Sleep was manifested by poorly formed K-complexes. Photic stimulation was performed at various flash frequencies and did  not evoke any significant posterior driving response. Hyperventilation  exercise was not performed due to the patient's clinical history. A 1-channel EKG rhythm strip was reviewed and showed no obvious cardiac  dysrhythmias. EEG DIAGNOSES:  This EEG is abnormal due to the presence of:  1. Focal left mid temporal sharp waves. 2.  Focal left frontal slowing. 3.  Generalized background slowing and disorganization.     CLINICAL INTERPRETATION:  The focal sharp waves appeared epileptiform  and suggest that the patient may be at risk for focal seizures emanating  from the left temporal region. The slowing and disorganization in the  left frontal region is nonspecific though consistent with focal neuronal  dysfunction in that region. The generalized background slowing and  disorganization is also nonspecific and consistent with a  mild-to-moderate encephalopathy. No seizures were recorded. If  seizures remain a clinical concern, then a repeat EEG may be of further  benefit. Clinical correlation is advised.         Jose Fraga DO    D: 11/01/2022 13:24:45       T: 11/01/2022 13:27:07     [email or web address]  Job#: [id number]     Doc#: [id number]    CC:

## 2022-11-01 NOTE — CARE COORDINATION
Chart reviewed. Call placed for son, Davi, but his phone was not allowing him to hear me. Hopefully he will call back.   [facility]     Case Management   [phone]    11/1/2022  11:37 AM

## 2022-11-01 NOTE — CARE COORDINATION
Checked with Koffi Black at [facility] about [place] test.  She reports she will not need one, they plan to put her in a private room.   [place], [place]     Case Management   [phone]    11/1/2022  1:25 PM

## 2022-11-01 NOTE — CARE COORDINATION
DC order rec'd. Call rec'd from [facility] who reports she will be returning back to [facility] with [facility] involvement and [facility] order. Call placed to [facility] who approved. Arranged for transport for 4pm . Spoke with Bedside RN to inform. Informed G[facility]ell of  time who is speaking with family.   [place], [place]     Case Management   [phone]    11/1/2022  12:54 PM

## 2022-11-01 NOTE — CONSULTS
[facility]  Palliative Care   Consult Note    NAME:  Pardeep Ayala  MEDICAL RECORD NUMBER:  [id number]  AGE: 62 y.o. GENDER: female  : 1965  TODAY'S DATE:  2022    Subjective     Reason for Consult:  goals of care, hospice discussion, and code status   Visit Type: Initial Consult      Pardeep Ayala is a 62 y.o. female referred by:   [x] Physician    PAST MEDICAL HISTORY  History reviewed. No pertinent past medical history. PAST SURGICAL HISTORY  Past Surgical History:   Procedure Laterality Date    GASTROSTOMY TUBE PLACEMENT N/A 10/13/2022    ESOPHAGOGASTRODUODENOSCOPY WITH  PERCUTANEOUS ENDOSCOPIC GASTROSTOMY  TUBE PLACEMENT performed by Charlene Rodriguez MD at [address] [place] [address]  10/13/2022    EGD BIOPSY performed by Charlene Rodriguez MD at [address] [facility]  History reviewed. No pertinent family history. SOCIAL HISTORY  Social History     Tobacco Use    Smoking status: Never     Passive exposure: Never    Smokeless tobacco: Never   Vaping Use    Vaping Use: Never used   Substance Use Topics    Alcohol use: Not Currently    Drug use: Not Currently       ALLERGIES  No Known Allergies    MEDICATIONS  No current facility-administered medications on file prior to encounter.      Current Outpatient Medications on File Prior to Encounter   Medication Sig Dispense Refill    acetaminophen (TYLENOL) 325 MG tablet Take 650 mg by mouth every 4 hours as needed for Pain      bacitracin 500 UNIT/GM ointment Apply 1 each topically 2 times daily Apply to right cheek and shoulder      carvedilol (COREG) 6.25 MG tablet Take 6.25 mg by mouth 2 times daily (with meals)      levETIRAcetam (KEPPRA) 100 MG/ML solution 10 mLs by Per G Tube route 2 times daily 600 mL 0    aspirin 81 MG chewable tablet Take 81 mg by mouth daily      atorvastatin (LIPITOR) 80 MG tablet Take 80 mg by mouth nightly      vitamin D (CHOLECALCIFEROL) 25 Death    Level of patient/caregiver understanding able to:        [x] Verbalize Understanding   [] Demonstrate Understanding       [] Teach Back       [] Needs Reinforcement     []  Other:      Teaching Time:  0hours  30 minutes     Plan        Social Service Consult Made:  Yes  Assistance filling out Living Will/HPOA:  No      Discharge Plan:  Education/support to family  Providing support for coping/adaptation/distress of family  Clarification of medical condition to patient and family  Code status clarified: [facility]  Palliative care orders introduced  Provided information about hospice    Discharge Environment:  [x] Hospice Consult Agency: List provided chose [facility]    [x] ECF with Hospice    Discussion:  Patient was admitted for AMS, fever. She is long term resident at [facility], history of developmental delay, strokes, sickle cell disease. Last admission she had a feeding tube place. She has been back at [facility] and did get hospice involved in her care. I met with her aunt, Matt Jo this am she feels the hospice company was not present as they stated and was conflicted about Yoselyn's care at [facility]. We have had a long discussion about what the family wants for Yoselyn given all of her medical conditions and now new findings of  abd mass concerning for cancer. Pako Ley feels that comfort is what Manuel Abad needs at this time and would like to change her code status to DNR CC and enlist help of hospice. She thought she was signing up with [facility] at [facility] and would like to use them for hospices services at [facility]. Dr Nasra Dorado informed of above orders noted. I will continue to follow Ms. Neville's care as needed. Thank you for allowing me to participate in the care of Ms. Neville .      Electronically signed by Kary Freedman RN, BSN, [facility] on 11/1/2022 at 12:10 PM  [address] [place] [address]  Office: [phone]

## 2022-11-01 NOTE — PROGRESS NOTES
Physician Progress Note      Jones Wilson  CSN #:                  [id number]  :                       1965  ADMIT DATE:       10/31/2022 6:10 AM  DISCH DATE:  RESPONDING  PROVIDER #:        Anders Lau MD          QUERY TEXT:    Patient admitted with septic shock. Noted to have wheezes and rales on   arrival with respiratory rate of 43 and . O2 sat 100% on NRB. Acute   respiratory failure documented per ED provider without mention in H&P. If   possible, please document in the progress notes and discharge summary if acute   respiratory failure was: The medical record reflects the following:  Risk Factors: septic shock  Clinical Indicators: wheezes and rales on arrival with respiratory rate of 43   and ;  O2 sat 100% on NRB  Treatment: Supplemental O2, monitoring O@ sat    Thank you,  Caryn Clark RN, BSN, CCDS  [email or web address]  Options provided:  -- Acute respiratory failure was present on admission  -- Acute respiratory failure was ruled out  -- Other - I will add my own diagnosis  -- Disagree - Not applicable / Not valid  -- Disagree - Clinically unable to determine / Unknown  -- Refer to Clinical Documentation Reviewer    PROVIDER RESPONSE TEXT:    Acute respiratory failure was present on admission.     Query created by: Yo Newton on 2022 10:03 AM      Electronically signed by:  Anders Lau MD 2022 1:02 PM

## 2022-11-01 NOTE — PROGRESS NOTES
Pulmonary Critical Care Progress Note     Patient's name:  [address] [place] [address] Record Number: [id number]  Patient's account/billing number: [de-identified]  Patient's YOB: 1965  Age: 62 y.o. Date of Admission: 10/31/2022  6:10 AM  Date of Consult: 11/1/2022      Primary Care Physician: Gonzalo Leblanc MD      Code Status: DNR-CCA    Reason for consult: Septic shock. Assessment and Plan     Septic shock  Abdominal mass ? Cancer  Metabolic encephalopathy   Transaminitis   H/o seizure disorder     Plan:  Follow up culture results   Broad spectrum antibiotics  Off pressors   Gentle hydration   Aspiration precautions  Seizure precautions, follow up EEG results   GI and dvt prophylaxis  Palliative care on board. Subjective:  Off pressors   Still encephalopathic        REVIEW OF SYSTEMS:  Review of Systems -   Unable to obtain patient unresponsive. Physical Exam:    Vitals: /84   Pulse 73   Temp 97.8 °F (36.6 °C) (Rectal)   Resp 13   Ht 5' 1\" (1.549 m)   Wt 140 lb 10.5 oz (63.8 kg)   SpO2 100%   BMI 26.58 kg/m²     Last Body weight:   Wt Readings from Last 3 Encounters:   11/01/22 140 lb 10.5 oz (63.8 kg)   10/13/22 143 lb 15.4 oz (65.3 kg)       Body Mass Index : Body mass index is 26.58 kg/m². Intake and Output summary:   Intake/Output Summary (Last 24 hours) at 11/1/2022 1124  Last data filed at 11/1/2022 0605  Gross per 24 hour   Intake 1951.07 ml   Output 1405 ml   Net 546.07 ml       Physical Examination:     PHYSICAL EXAM:    Gen: Moderate acute distress. Eyes: PERRL. Anicteric sclera. No conjunctival injection. ENT: No discharge. Posterior oropharynx clear. External appearance of ears and nose normal.  Neck: Trachea midline. No mass, no lymphadenopathy    Resp: diminished with no wheezing   CV: Regular rate. Regular rhythm. No murmur or rub. No edema. GI: Soft, Non-tender. Non-distended.  +BS, PEG in place Skin: Warm, dry, w/o erythema. Lymph: No cervical or supraclavicular LAD. M/S: No cyanosis. No clubbing. Neuro:  unresponsive, no focal deficit no seizure activities       Laboratory findings:-    CBC:   Recent Labs     11/01/22 0432   WBC 9.0   HGB 7.8*        BMP:    Recent Labs     10/31/22  0632 11/01/22 0432    144   K 4.7 3.6    111*   CO2 22 24   BUN 63* 42*   CREATININE 0.9 <0.5*   GLUCOSE 159* 140*     S. Calcium:  Recent Labs     11/01/22 0432   CALCIUM 8.2*       Hepatic functions:   Recent Labs     10/31/22  0632   ALKPHOS 183*   *   *   PROT 7.6   BILITOT 0.5   LABALBU 3.1*       Cardiac enzymes:  Recent Labs     10/31/22  8510   TROPONINI <0.01         Radiology Review:  Pertinent images / reports were reviewed as a part of this visit. CTPA: Results for orders placed during the hospital encounter of 09/29/22    CT CHEST PULMONARY EMBOLISM W CONTRAST    Narrative  EXAMINATION:  CTA OF THE CHEST, 9/29/2022 8:06 pm    TECHNIQUE:  CTA of the chest was performed after the administration of intravenous  contrast.  Multiplanar reformatted images are provided for review. MIP  images are provided for review. Automated exposure control, iterative  reconstruction, and/or weight based adjustment of the mA/kV was utilized to  reduce the radiation dose to as low as reasonably achievable. COMPARISON:  None    HISTORY:  ORDERING SYSTEM PROVIDED HISTORY:  Tachycardia, altered mental status,  history of autoimmune disease. TECHNOLOGIST PROVIDED HISTORY:  Reason for Exam:  Tachycardia, altered mental status, history of autoimmune  disease. Decision Support Exception - unselect if not a suspected or confirmed  emergency medical condition->Emergency Medical Condition (MA)  Reason for Exam:  Tachycardia, altered mental status, history of autoimmune  disease. FINDINGS:  Pulmonary Arteries: Pulmonary arteries are adequately opacified for  evaluation.   No evidence of intraluminal filling defect to suggest pulmonary  embolism. Main pulmonary artery is normal in caliber. Mediastinum: No evidence of mediastinal lymphadenopathy. The heart is  prominent but no pericardial disease. There is no acute abnormality of the  thoracic aorta. Lungs/pleura: The lungs are without acute process. No focal consolidation or  pulmonary edema. Left lower lobe atelectatic changes and mild loss of  volume. No evidence of pleural effusion or pneumothorax. Upper Abdomen: Limited images of the upper abdomen are unremarkable. Soft Tissues/Bones: No acute bone or soft tissue abnormality. Impression  No evidence of pulmonary embolism or acute pulmonary abnormality. Mild left  lower lobe atelectatic changes. CXR PA/LAT: No results found for this or any previous visit. CXR portable: Results for orders placed during the hospital encounter of 10/31/22    XR CHEST PORTABLE    Narrative  EXAMINATION:  ONE XRAY VIEW OF THE CHEST    10/31/2022 8:57 am    COMPARISON:  10/31/2022 0611 hours    HISTORY:  ORDERING SYSTEM PROVIDED HISTORY: Right IG CVC  TECHNOLOGIST PROVIDED HISTORY:  Reason for exam:->Right IG CVC  Reason for Exam: Right IG CVC    FINDINGS:  A right IJ CVC is been placed with its tip in the region of the cavoatrial  junction. There is no evidence of pneumothorax. Heart and lungs are  unchanged from the prior exam.  A linear density in the left lung base is  consistent with a disc of atelectasis or scar. Impression  Right IJ CVC placement with its tip in the region of the cavoatrial junction.          Critical Care time 31 minutes       Bonny Lundborg, MD, MMARBELLA.            11/1/2022, 11:24 AM

## 2022-11-01 NOTE — CARE COORDINATION
Son, Gareth Alonzo, called back. He confirmed the plan is to return to [facility] for her dc plan. Gave him the phone number to speak with bedside RN for further details.   [facility]     Case Management   [phone]    11/1/2022  11:40 AM

## 2022-11-01 NOTE — CARE COORDINATION
SOCIAL WORK DISCHARGE SUMMARY        DATE OF DISCHARGE:   Tuesday, 11-1-2022        LOCATION:    Return to long Term Care Bed at [facility]        Discharging to Facility/ Agency   Name: PIPER STAUFFER [facility]  Address:  [address]   Phone:  [phone] . [address] [facility] TIME:   4:00 pm    [facility]   [address] [place], [place]     Case Management   [phone]    11/1/2022  12:55 PM

## 2022-11-01 NOTE — PROGRESS NOTES
4 Eyes Skin Assessment     NAME:  Yoselyn Neville  YOB: 1965  MEDICAL RECORD NUMBER:  [id number]    The patient is being assess for  Shift Handoff    I agree that 2 RN's have performed a thorough Head to Toe Skin Assessment on the patient. ALL assessment sites listed below have been assessed. Areas assessed by both nurses:    Head, Face, Ears, Shoulders, Back, Chest, Arms, Elbows, Hands, Sacrum. Buttock, Coccyx, Ischium, and Legs. Feet and Heels        Does the Patient have a Wound? Yes wound(s) were present on assessment.  LDA wound assessment was Initiated and completed        Cedric Prevention initiated:  Yes   Wound Care Orders initiated:  Yes    Pressure Injury (Stage 3,4, Unstageable, DTI, NWPT, and Complex wounds) if present place referral/consult order under [de-identified] Yes    New and Established Ostomies if present place consult order under : No      Nurse 1 eSignature: Electronically signed by Noris Dash RN on 11/1/22 at 6:47 AM EDT    **SHARE this note so that the co-signing nurse is able to place an eSignature**    Nurse 2 eSignature: Electronically signed by Lili Lennox, RN on 11/1/22 at 7:15 AM EDT

## 2022-11-01 NOTE — DISCHARGE SUMMARY
Hospital Medicine Discharge Summary    Patient ID: Isa Ngo      Patient's PCP: Jennifer Connell MD    Admit Date: 10/31/2022     Discharge Date:   11/01/22      Admitting Provider: Lee Senior MD     Discharge Provider: Lee Senior MD     Discharge Diagnoses: Active Hospital Problems    Diagnosis     Septicemia [facility]) [A41.9]      Priority: Medium       The patient was seen and examined on day of discharge and this discharge summary is in conjunction with any daily progress note from day of discharge. Hospital Course: The patient is a 62 y.o. female who presents to [facility] with AMS. With a past medical history of developmental delay, seizure disorder, strokes, sickle cell disease, [facility]   [facility]  at baseline with limited communication, patient reportedly had been noted to have worsening level of consciousness in the past 1 to 2 days as well as fever. Patient does not participate in conversation however patient's niece was at bedside who helped aid with history. Patient was also noted to have worsening intake with a PEG inserted around 2 weeks ago. Upon presentation to emergency patient was in septic shock, febrile to 41.8, tachycardic to 135 and hypotensive requiring norepinephrine. Patient was admitted with septic shock,      Newly found abdominal mass  CT abdomen showed an irregular enhancing lesion in the duodenal area, with worrisome for possible pancreatic primary versus neuroendocrine, with lymph nodes that may represent metastasis.     Case discussed with the patient's niece at length, CODE STATUS changed to comfort care, patient will be discharged to nursing home with hospice      Physical Exam Performed:     /84   Pulse 73   Temp 97.8 °F (36.6 °C) (Rectal)   Resp 13   Ht 5' 1\" (1.549 m)   Wt 140 lb 10.5 oz (63.8 kg)   SpO2 100%   BMI 26.58 kg/m²     General appearance: Lethargic  HEENT Normal cephalic, atraumatic without obvious deformity. Pupils equal, round, and reactive to light. Extra ocular muscles intact. Conjunctivae/corneas clear. Neck: Supple, No jugular venous distention/bruits. Trachea midline without thyromegaly or adenopathy with full range of motion. Lungs: Clear to auscultation, bilaterally without Rales/Wheezes/Rhonchi with good respiratory effort. Heart: Regular rate and rhythm with Normal S1/S2 without murmurs, rubs or gallops, point of maximum impulse non-displaced  Abdomen: G-tube noted  Extremities: No clubbing, cyanosis, or edema bilaterally. Full range of motion without deformity and normal gait intact. Skin: Skin color, texture, turgor normal.  No rashes or lesions. Neurologic: Lethargic  Capillary Refill: Acceptable  < 3 seconds  Peripheral Pulses: +3 Easily felt, not easily obliterated with pressure       Labs: For convenience and continuity at follow-up the following most recent labs are provided:      CBC:    Lab Results   Component Value Date/Time    WBC 9.0 11/01/2022 04:32 AM    HGB 7.8 11/01/2022 04:32 AM    HCT 25.0 11/01/2022 04:32 AM     11/01/2022 04:32 AM       Renal:    Lab Results   Component Value Date/Time     11/01/2022 04:32 AM    K 3.6 11/01/2022 04:32 AM    K 4.7 10/31/2022 06:32 AM     11/01/2022 04:32 AM    CO2 24 11/01/2022 04:32 AM    BUN 42 11/01/2022 04:32 AM    CREATININE <0.5 11/01/2022 04:32 AM    CALCIUM 8.2 11/01/2022 04:32 AM    PHOS 2.6 11/01/2022 04:32 AM         Significant Diagnostic Studies    Radiology:   CT CHEST ABDOMEN PELVIS W CONTRAST Additional Contrast? None   Preliminary Result   Irregular 27 x 26 x 23 mm peripherally enhancing lesion in the region of the   duodenal sweep. Additional hyperattenuating lesions scattered throughout the   duodenum measuring up to 8 mm in size.   Duodenal metastatic disease is in the   differential.  A primary pancreatic neoplasm is also in the differential.   Neuroendocrine primary neoplasm is in the differential.  MRI of the abdomen   with and without contrast may be helpful for further evaluation. Also would   recommend correlation with duodenal scope. Mild soft tissue thickening surrounding the origin of the SMA is also noted. This may be related to the pancreatic lesion or atherosclerotic disease. 14 x 18 mm peripancreatic lymph node and 11 mm short axis left para-aortic   lymph node in the upper abdomen are demonstrated that may represent   metastatic disease. Additional enlarged subcarinal lymph node in the chest.      No evidence of suspicious pulmonary nodule. Trace bilateral pleural effusions with bibasilar atelectasis. The findings were sent to the Radiology Results [address] at 10:28   am on 10/31/2022 to be communicated to a licensed caregiver. XR CHEST PORTABLE   Final Result   Right IJ CVC placement with its tip in the region of the cavoatrial junction. XR CHEST PORTABLE   Final Result   Increasing mild perihilar opacities centrally. Pattern may represent   vascular congestion or underlying viral infection. Consults:     IP CONSULT TO PHARMACY  IP CONSULT TO HOSPITALIST  PHARMACY TO DOSE VANCOMYCIN  IP CONSULT TO DIETITIAN  IP CONSULT TO PALLIATIVE CARE    Disposition:  SNF ( with hospice )     Condition at Discharge: Stable    Discharge Instructions/Follow-up:    -Comfort measures as per hospice    Code Status:  DNR-CC     Activity: activity as tolerated    Diet: tube feeding. Discharge Medications:     Current Discharge Medication List             Details   Morphine Sulfate, Concentrate, 5 MG/0.25ML SOLN Take 2 mg by mouth 4 times daily as needed (pain). Qty: 30 mL, Refills: 0    Comments: Reduce doses taken as pain becomes manageable  Associated Diagnoses: Hospice care      LORazepam (ATIVAN) 0.5 MG tablet Take 1 tablet by mouth every 8 hours as needed for Anxiety for up to 3 days.   Qty: 12 tablet, Refills: 0    Associated Diagnoses: Hospice care                Details   levETIRAcetam (KEPPRA) 100 MG/ML solution 10 mLs by Per G Tube route 2 times daily  Qty: 600 mL, Refills: 0             Time Spent on discharge is more than 30 minutes in the examination, evaluation, counseling and review of medications and discharge plan. Signed:    Lee Senior MD   11/1/2022      Thank you Jennifer Connell MD for the opportunity to be involved in this patient's care. If you have any questions or concerns, please feel free to contact me at [phone].

## 2022-11-04 LAB
BLOOD CULTURE, ROUTINE: NORMAL
CULTURE, BLOOD 2: NORMAL

## 2022-11-09 NOTE — ED PROVIDER NOTES
Attending Supervisory Note/Shared Visit   I have personally performed a face to face diagnostic evaluation on this patient. I have reviewed the mid-levels findings and agree. History and Exam by me shows chronically ill-appearing female. Patient is a 80-year-old female with a history of previous CVA. Patient reportedly had been mainly responsive for 24 hours at her nursing facility. It was unclear why EMS had been called after such a prolonged time of altered mental state but patient arrives reportedly minimally responsive. Patient does open eyes spontaneously and is protecting her airway on arrival.  There is no additional information available at this time. On arrival patient noted to be tachycardic borderline hypotensive and severely febrile. Presentation concerning for sepsis. Per EMS patient was supposed to go to hospice today. They state that the patient has unsigned DNR paperwork. I did call the patient's listed POA. Discussed patient's presentation and concern for severe sepsis. Patient's POA further clarified that they would like all things done to preserve the patient's life up to the point of arrest.  POA stated that they were going to try to talk to the patient's son. My exam patient very ill-appearing. Patient with dry mucous membranes. She has a regular rhythm with tachycardic rate. Patient does have wheezing in all lung fields and bilateral lower lung field rales. Patient does not follow commands. Patient does open eyes spontaneously. Patient seen in conjunction with the advanced peds provider. Laboratory Demonstrated leukocytosis and anemia. Hemoglobin decreased slightly from previous. Rapid flu and COVID-negative. Alk phosphatase and LFT significantly elevated. Initial lactate elevated patient does meet severe sepsis criteria. CT the chest and abdomen demonstrated GI mass concerning for malignancy and metastatic disease.   Patient started on broad-spectrum antibiotics will be admitted to the hospital for further medical management evaluation. Patient able to maintain her airway and oxygenation and was then emergently intubated. patient became hypotensive requiring pressure support and central line was placed by Dr. Apolinar Fofana. Please see their note for further procedure details. Critical Care  There was a high probability of life-threatening clinical deterioration in the patient's condition requiring my urgent intervention. Total critical care time with the patient was 35 minutes excluding separately reportable procedures, as well as time documented by the advanced peds provider. .  Critical care required due to patients sepsis and altered mental state. EKG demonstrates a sinus rhythm ventricular rate of 146 bpm.  MT interval and QTc interval within normal limits. There are no significant ST elevations or depressions EKG is nondiagnostic for ACS. Joanne Dozier Compared EKG from 9/29/2022 did not appreciate any significant change. Disposition:  1. Septicemia ([facility] [address]    2. Altered mental status, unspecified altered mental status type    3. Acute respiratory failure with hypoxia (HCC)    4. Abnormal CT of the abdomen    5.  Lexie Jones MD  Attending Emergency Physician        Chrissie Primrose, MD  11/09/22 1040

## 2022-11-20 ENCOUNTER — APPOINTMENT (OUTPATIENT)
Dept: CT IMAGING | Age: 57
DRG: 811 | End: 2022-11-20
Payer: MEDICAID

## 2022-11-20 ENCOUNTER — HOSPITAL ENCOUNTER (INPATIENT)
Age: 57
LOS: 1 days | Discharge: HOSPICE | DRG: 811 | End: 2022-11-21
Attending: EMERGENCY MEDICINE | Admitting: INTERNAL MEDICINE
Payer: MEDICAID

## 2022-11-20 ENCOUNTER — APPOINTMENT (OUTPATIENT)
Dept: GENERAL RADIOLOGY | Age: 57
DRG: 811 | End: 2022-11-20
Payer: MEDICAID

## 2022-11-20 DIAGNOSIS — I26.99 OTHER ACUTE PULMONARY EMBOLISM WITHOUT ACUTE COR PULMONALE (HCC): ICD-10-CM

## 2022-11-20 DIAGNOSIS — T78.3XXA ANGIOEDEMA, INITIAL ENCOUNTER: Primary | ICD-10-CM

## 2022-11-20 DIAGNOSIS — T78.3XXA ANGIOEDEMA OF TONGUE: ICD-10-CM

## 2022-11-20 LAB
A/G RATIO: 0.6 (ref 1.1–2.2)
ALBUMIN SERPL-MCNC: 3 G/DL (ref 3.4–5)
ALP BLD-CCNC: 144 U/L (ref 40–129)
ALT SERPL-CCNC: 66 U/L (ref 10–40)
AMORPHOUS: ABNORMAL /HPF
ANION GAP SERPL CALCULATED.3IONS-SCNC: 14 MMOL/L (ref 3–16)
APTT: 39.1 SEC (ref 23–34.3)
AST SERPL-CCNC: 47 U/L (ref 15–37)
BACTERIA: ABNORMAL /HPF
BASE EXCESS VENOUS: 6.4 MMOL/L
BASOPHILS ABSOLUTE: 0.1 K/UL (ref 0–0.2)
BASOPHILS RELATIVE PERCENT: 0.4 %
BILIRUB SERPL-MCNC: 0.4 MG/DL (ref 0–1)
BILIRUBIN URINE: NEGATIVE
BLOOD, URINE: NEGATIVE
BUN BLDV-MCNC: 18 MG/DL (ref 7–20)
CALCIUM SERPL-MCNC: 9.8 MG/DL (ref 8.3–10.6)
CARBOXYHEMOGLOBIN: 1.2 %
CHLORIDE BLD-SCNC: 94 MMOL/L (ref 99–110)
CLARITY: ABNORMAL
CO2: 26 MMOL/L (ref 21–32)
COLOR: YELLOW
COMMENT UA: ABNORMAL
CREAT SERPL-MCNC: <0.5 MG/DL (ref 0.6–1.1)
CRYSTALS, UA: ABNORMAL /HPF
EOSINOPHILS ABSOLUTE: 0.2 K/UL (ref 0–0.6)
EOSINOPHILS RELATIVE PERCENT: 1.2 %
EPITHELIAL CELLS, UA: 0 /HPF (ref 0–5)
GFR SERPL CREATININE-BSD FRML MDRD: >60 ML/MIN/{1.73_M2}
GLUCOSE BLD-MCNC: 112 MG/DL (ref 70–99)
GLUCOSE URINE: NEGATIVE MG/DL
HCO3 VENOUS: 32 MMOL/L (ref 23–29)
HCT VFR BLD CALC: 29.4 % (ref 36–48)
HEMOGLOBIN: 8.9 G/DL (ref 12–16)
HYALINE CASTS: 2 /LPF (ref 0–8)
INR BLD: 1.06 (ref 0.87–1.14)
KEPPRA DOSE AMT: NORMAL
KEPPRA: 25.2 UG/ML (ref 6–46)
KETONES, URINE: NEGATIVE MG/DL
LACTIC ACID, SEPSIS: 0.8 MMOL/L (ref 0.4–1.9)
LACTIC ACID, SEPSIS: 1.6 MMOL/L (ref 0.4–1.9)
LEUKOCYTE ESTERASE, URINE: NEGATIVE
LYMPHOCYTES ABSOLUTE: 0.9 K/UL (ref 1–5.1)
LYMPHOCYTES RELATIVE PERCENT: 6 %
MCH RBC QN AUTO: 23.1 PG (ref 26–34)
MCHC RBC AUTO-ENTMCNC: 30.4 G/DL (ref 31–36)
MCV RBC AUTO: 75.9 FL (ref 80–100)
METHEMOGLOBIN VENOUS: 0.1 %
MICROSCOPIC EXAMINATION: YES
MONOCYTES ABSOLUTE: 1.1 K/UL (ref 0–1.3)
MONOCYTES RELATIVE PERCENT: 7.8 %
NEUTROPHILS ABSOLUTE: 12.3 K/UL (ref 1.7–7.7)
NEUTROPHILS RELATIVE PERCENT: 84.6 %
NITRITE, URINE: NEGATIVE
O2 SAT, VEN: 78 %
O2 THERAPY: ABNORMAL
PCO2, VEN: 52.2 MMHG (ref 40–50)
PDW BLD-RTO: 19 % (ref 12.4–15.4)
PH UA: 8 (ref 5–8)
PH VENOUS: 7.4 (ref 7.35–7.45)
PLATELET # BLD: 397 K/UL (ref 135–450)
PMV BLD AUTO: 8.8 FL (ref 5–10.5)
PO2, VEN: 47 MMHG
POTASSIUM REFLEX MAGNESIUM: 3.8 MMOL/L (ref 3.5–5.1)
PRO-BNP: 126 PG/ML (ref 0–124)
PROTEIN UA: 100 MG/DL
PROTHROMBIN TIME: 13.8 SEC (ref 11.7–14.5)
RAPID INFLUENZA  B AGN: NEGATIVE
RAPID INFLUENZA A AGN: NEGATIVE
RBC # BLD: 3.87 M/UL (ref 4–5.2)
RBC UA: ABNORMAL /HPF (ref 0–4)
SARS-COV-2, NAAT: NOT DETECTED
SODIUM BLD-SCNC: 134 MMOL/L (ref 136–145)
SPECIFIC GRAVITY UA: 1.02 (ref 1–1.03)
TCO2 CALC VENOUS: 34 MMOL/L
TOTAL PROTEIN: 7.8 G/DL (ref 6.4–8.2)
TROPONIN: <0.01 NG/ML
URINE REFLEX TO CULTURE: ABNORMAL
URINE TYPE: ABNORMAL
UROBILINOGEN, URINE: 2 E.U./DL
WBC # BLD: 14.5 K/UL (ref 4–11)
WBC UA: ABNORMAL /HPF (ref 0–5)

## 2022-11-20 PROCEDURE — 6360000004 HC RX CONTRAST MEDICATION: Performed by: EMERGENCY MEDICINE

## 2022-11-20 PROCEDURE — 85730 THROMBOPLASTIN TIME PARTIAL: CPT

## 2022-11-20 PROCEDURE — 93005 ELECTROCARDIOGRAM TRACING: CPT | Performed by: EMERGENCY MEDICINE

## 2022-11-20 PROCEDURE — 2500000003 HC RX 250 WO HCPCS: Performed by: EMERGENCY MEDICINE

## 2022-11-20 PROCEDURE — 99285 EMERGENCY DEPT VISIT HI MDM: CPT

## 2022-11-20 PROCEDURE — 2580000003 HC RX 258: Performed by: INTERNAL MEDICINE

## 2022-11-20 PROCEDURE — 70491 CT SOFT TISSUE NECK W/DYE: CPT

## 2022-11-20 PROCEDURE — 84484 ASSAY OF TROPONIN QUANT: CPT

## 2022-11-20 PROCEDURE — 96375 TX/PRO/DX INJ NEW DRUG ADDON: CPT

## 2022-11-20 PROCEDURE — 81001 URINALYSIS AUTO W/SCOPE: CPT

## 2022-11-20 PROCEDURE — 85610 PROTHROMBIN TIME: CPT

## 2022-11-20 PROCEDURE — 83880 ASSAY OF NATRIURETIC PEPTIDE: CPT

## 2022-11-20 PROCEDURE — 80053 COMPREHEN METABOLIC PANEL: CPT

## 2022-11-20 PROCEDURE — 96365 THER/PROPH/DIAG IV INF INIT: CPT

## 2022-11-20 PROCEDURE — 82803 BLOOD GASES ANY COMBINATION: CPT

## 2022-11-20 PROCEDURE — 96367 TX/PROPH/DG ADDL SEQ IV INF: CPT

## 2022-11-20 PROCEDURE — 80177 DRUG SCRN QUAN LEVETIRACETAM: CPT

## 2022-11-20 PROCEDURE — 6360000002 HC RX W HCPCS: Performed by: EMERGENCY MEDICINE

## 2022-11-20 PROCEDURE — 83605 ASSAY OF LACTIC ACID: CPT

## 2022-11-20 PROCEDURE — 87040 BLOOD CULTURE FOR BACTERIA: CPT

## 2022-11-20 PROCEDURE — 1200000000 HC SEMI PRIVATE

## 2022-11-20 PROCEDURE — 87804 INFLUENZA ASSAY W/OPTIC: CPT

## 2022-11-20 PROCEDURE — 6370000000 HC RX 637 (ALT 250 FOR IP): Performed by: INTERNAL MEDICINE

## 2022-11-20 PROCEDURE — 85025 COMPLETE CBC W/AUTO DIFF WBC: CPT

## 2022-11-20 PROCEDURE — 71045 X-RAY EXAM CHEST 1 VIEW: CPT

## 2022-11-20 PROCEDURE — 6360000002 HC RX W HCPCS: Performed by: INTERNAL MEDICINE

## 2022-11-20 PROCEDURE — 36415 COLL VENOUS BLD VENIPUNCTURE: CPT

## 2022-11-20 PROCEDURE — A4216 STERILE WATER/SALINE, 10 ML: HCPCS | Performed by: EMERGENCY MEDICINE

## 2022-11-20 PROCEDURE — 96366 THER/PROPH/DIAG IV INF ADDON: CPT

## 2022-11-20 PROCEDURE — 87635 SARS-COV-2 COVID-19 AMP PRB: CPT

## 2022-11-20 PROCEDURE — 2580000003 HC RX 258: Performed by: EMERGENCY MEDICINE

## 2022-11-20 RX ORDER — CLOPIDOGREL BISULFATE 75 MG/1
75 TABLET ORAL DAILY
COMMUNITY
End: 2022-11-20

## 2022-11-20 RX ORDER — 0.9 % SODIUM CHLORIDE 0.9 %
1000 INTRAVENOUS SOLUTION INTRAVENOUS ONCE
Status: COMPLETED | OUTPATIENT
Start: 2022-11-20 | End: 2022-11-20

## 2022-11-20 RX ORDER — LEVETIRACETAM 100 MG/ML
1000 SOLUTION ORAL 2 TIMES DAILY
COMMUNITY

## 2022-11-20 RX ORDER — SODIUM CHLORIDE 9 MG/ML
INJECTION, SOLUTION INTRAVENOUS PRN
Status: DISCONTINUED | OUTPATIENT
Start: 2022-11-20 | End: 2022-11-21 | Stop reason: HOSPADM

## 2022-11-20 RX ORDER — METHYLPREDNISOLONE SODIUM SUCCINATE 40 MG/ML
40 INJECTION, POWDER, LYOPHILIZED, FOR SOLUTION INTRAMUSCULAR; INTRAVENOUS EVERY 8 HOURS
Status: DISCONTINUED | OUTPATIENT
Start: 2022-11-20 | End: 2022-11-21 | Stop reason: HOSPADM

## 2022-11-20 RX ORDER — HEPARIN SODIUM 10000 [USP'U]/100ML
0-4000 INJECTION, SOLUTION INTRAVENOUS CONTINUOUS
Status: DISCONTINUED | OUTPATIENT
Start: 2022-11-20 | End: 2022-11-21

## 2022-11-20 RX ORDER — ONDANSETRON 4 MG/1
4 TABLET, ORALLY DISINTEGRATING ORAL EVERY 8 HOURS PRN
Status: DISCONTINUED | OUTPATIENT
Start: 2022-11-20 | End: 2022-11-21 | Stop reason: HOSPADM

## 2022-11-20 RX ORDER — MORPHINE SULFATE 100 MG/5ML
5 SOLUTION ORAL 2 TIMES DAILY
COMMUNITY

## 2022-11-20 RX ORDER — ATORVASTATIN CALCIUM 80 MG/1
80 TABLET, FILM COATED ORAL DAILY
COMMUNITY
End: 2022-11-20

## 2022-11-20 RX ORDER — MORPHINE SULFATE 10 MG/.5ML
2 SOLUTION ORAL 4 TIMES DAILY PRN
Status: DISCONTINUED | OUTPATIENT
Start: 2022-11-20 | End: 2022-11-21 | Stop reason: HOSPADM

## 2022-11-20 RX ORDER — HEPARIN SODIUM 1000 [USP'U]/ML
40 INJECTION, SOLUTION INTRAVENOUS; SUBCUTANEOUS PRN
Status: DISCONTINUED | OUTPATIENT
Start: 2022-11-20 | End: 2022-11-20

## 2022-11-20 RX ORDER — POLYETHYLENE GLYCOL 3350 17 G/17G
17 POWDER, FOR SOLUTION ORAL DAILY PRN
Status: DISCONTINUED | OUTPATIENT
Start: 2022-11-20 | End: 2022-11-21 | Stop reason: HOSPADM

## 2022-11-20 RX ORDER — MORPHINE SULFATE 10 MG/.5ML
5 SOLUTION ORAL 2 TIMES DAILY
Status: DISCONTINUED | OUTPATIENT
Start: 2022-11-20 | End: 2022-11-21

## 2022-11-20 RX ORDER — ONDANSETRON 2 MG/ML
4 INJECTION INTRAMUSCULAR; INTRAVENOUS EVERY 6 HOURS PRN
Status: DISCONTINUED | OUTPATIENT
Start: 2022-11-20 | End: 2022-11-21 | Stop reason: HOSPADM

## 2022-11-20 RX ORDER — SODIUM CHLORIDE 0.9 % (FLUSH) 0.9 %
5-40 SYRINGE (ML) INJECTION EVERY 12 HOURS SCHEDULED
Status: DISCONTINUED | OUTPATIENT
Start: 2022-11-20 | End: 2022-11-21 | Stop reason: HOSPADM

## 2022-11-20 RX ORDER — HEPARIN SODIUM 1000 [USP'U]/ML
80 INJECTION, SOLUTION INTRAVENOUS; SUBCUTANEOUS PRN
Status: DISCONTINUED | OUTPATIENT
Start: 2022-11-20 | End: 2022-11-20

## 2022-11-20 RX ORDER — CARVEDILOL 6.25 MG/1
6.25 TABLET ORAL 2 TIMES DAILY WITH MEALS
COMMUNITY
End: 2022-11-20

## 2022-11-20 RX ORDER — ACETAMINOPHEN 650 MG/1
650 SUPPOSITORY RECTAL EVERY 6 HOURS PRN
Status: DISCONTINUED | OUTPATIENT
Start: 2022-11-20 | End: 2022-11-21 | Stop reason: HOSPADM

## 2022-11-20 RX ORDER — HEPARIN SODIUM 1000 [USP'U]/ML
4900 INJECTION, SOLUTION INTRAVENOUS; SUBCUTANEOUS ONCE
Status: COMPLETED | OUTPATIENT
Start: 2022-11-20 | End: 2022-11-20

## 2022-11-20 RX ORDER — DEXAMETHASONE SODIUM PHOSPHATE 4 MG/ML
10 INJECTION, SOLUTION INTRA-ARTICULAR; INTRALESIONAL; INTRAMUSCULAR; INTRAVENOUS; SOFT TISSUE ONCE
Status: COMPLETED | OUTPATIENT
Start: 2022-11-20 | End: 2022-11-20

## 2022-11-20 RX ORDER — METOPROLOL TARTRATE 5 MG/5ML
5 INJECTION INTRAVENOUS ONCE
Status: COMPLETED | OUTPATIENT
Start: 2022-11-20 | End: 2022-11-20

## 2022-11-20 RX ORDER — SODIUM CHLORIDE 0.9 % (FLUSH) 0.9 %
5-40 SYRINGE (ML) INJECTION PRN
Status: DISCONTINUED | OUTPATIENT
Start: 2022-11-20 | End: 2022-11-21 | Stop reason: HOSPADM

## 2022-11-20 RX ORDER — ACETAMINOPHEN 325 MG/1
650 TABLET ORAL EVERY 6 HOURS PRN
Status: DISCONTINUED | OUTPATIENT
Start: 2022-11-20 | End: 2022-11-21 | Stop reason: HOSPADM

## 2022-11-20 RX ADMIN — CEFEPIME 2000 MG: 2 INJECTION, POWDER, FOR SOLUTION INTRAVENOUS at 13:08

## 2022-11-20 RX ADMIN — FAMOTIDINE 20 MG: 10 INJECTION, SOLUTION INTRAVENOUS at 13:09

## 2022-11-20 RX ADMIN — SODIUM CHLORIDE 1000 ML: 9 INJECTION, SOLUTION INTRAVENOUS at 15:09

## 2022-11-20 RX ADMIN — SODIUM CHLORIDE 1000 ML: 9 INJECTION, SOLUTION INTRAVENOUS at 13:11

## 2022-11-20 RX ADMIN — IOPAMIDOL 75 ML: 755 INJECTION, SOLUTION INTRAVENOUS at 14:57

## 2022-11-20 RX ADMIN — METHYLPREDNISOLONE SODIUM SUCCINATE 40 MG: 40 INJECTION, POWDER, FOR SOLUTION INTRAMUSCULAR; INTRAVENOUS at 20:45

## 2022-11-20 RX ADMIN — DEXAMETHASONE SODIUM PHOSPHATE 10 MG: 4 INJECTION, SOLUTION INTRAMUSCULAR; INTRAVENOUS at 13:10

## 2022-11-20 RX ADMIN — MORPHINE SULFATE 5 MG: 10 SOLUTION ORAL at 20:46

## 2022-11-20 RX ADMIN — METOPROLOL TARTRATE 5 MG: 5 INJECTION INTRAVENOUS at 15:09

## 2022-11-20 RX ADMIN — HEPARIN SODIUM 1100 UNITS/HR: 10000 INJECTION, SOLUTION INTRAVENOUS at 16:09

## 2022-11-20 RX ADMIN — HEPARIN SODIUM 4900 UNITS: 1000 INJECTION INTRAVENOUS; SUBCUTANEOUS at 16:07

## 2022-11-20 RX ADMIN — Medication 10 ML: at 20:46

## 2022-11-20 RX ADMIN — VANCOMYCIN HYDROCHLORIDE 1000 MG: 1 INJECTION, POWDER, LYOPHILIZED, FOR SOLUTION INTRAVENOUS at 14:21

## 2022-11-20 ASSESSMENT — PAIN SCALES - GENERAL
PAINLEVEL_OUTOF10: 0
PAINLEVEL_OUTOF10: 0

## 2022-11-20 ASSESSMENT — PAIN DESCRIPTION - ORIENTATION
ORIENTATION: OTHER (COMMENT)
ORIENTATION: OTHER (COMMENT)

## 2022-11-20 ASSESSMENT — PAIN DESCRIPTION - DESCRIPTORS
DESCRIPTORS: OTHER (COMMENT)
DESCRIPTORS: OTHER (COMMENT)

## 2022-11-20 ASSESSMENT — PAIN SCALES - WONG BAKER
WONGBAKER_NUMERICALRESPONSE: 0
WONGBAKER_NUMERICALRESPONSE: 0

## 2022-11-20 ASSESSMENT — PAIN DESCRIPTION - LOCATION
LOCATION: OTHER (COMMENT)
LOCATION: OTHER (COMMENT)

## 2022-11-20 NOTE — ED NOTES
Pt resting in bed at this time, laying in a supine position with head of bed elevated . Call light remains in reach instructed pt how to use, and encouraged pt to call if needed assistance, no distress noted. RR even and unlabored, skin warm and dry. No needs at this time. Will continue to monitor closely.        Dania Gordon RN  11/20/22 5706

## 2022-11-20 NOTE — H&P
Hospital Medicine History & Physical      PCP: Nataliia Shetty MD    Date of Admission: 11/20/2022    Date of Service: Pt seen/examined on 11/20/2022  Placed in Observation. Chief Complaint: Tongue swelling      History Of Present Illness:    62 y.o. female who presented to [facility] [place] with tongue swelling. Patient is nursing home patient. She has poor baseline. She is contractured. Family had been in the process of getting her enrolled in hospice. She developed acute tongue swelling and hospice nurses and family sent her out for symptom management. No family is present to provide ancillary history. Her tongue is visibly swollen and her lips    Past Medical History:      No past medical history on file. Past Surgical History:          Procedure Laterality Date    GASTROSTOMY TUBE PLACEMENT N/A 10/13/2022    ESOPHAGOGASTRODUODENOSCOPY WITH  PERCUTANEOUS ENDOSCOPIC GASTROSTOMY  TUBE PLACEMENT performed by Alvina Armenta MD at [address] [facility]  10/13/2022    EGD BIOPSY performed by Alvina Armenta MD at [address] [facility]       Medications Prior to Admission:      Prior to Admission medications    Medication Sig Start Date End Date Taking? Authorizing Provider   morphine sulfate 20 MG/ML concentrated oral solution Take 5 mg by mouth in the morning and at bedtime. Yes Historical Provider, MD   Morphine Sulfate, Concentrate, 5 MG/0.25ML SOLN Take 2 mg by mouth 4 times daily as needed (pain). 11/1/22   Ortiz Foote MD   levETIRAcetam (KEPPRA) 100 MG/ML solution 10 mLs by Per G Tube route 2 times daily 10/17/22 11/16/22  Amanda Quiroga MD       Allergies:  Patient has no known allergies. Social History:      The patient currently lives nursing    TOBACCO:   reports that she has never smoked. She has never been exposed to tobacco smoke.  She has never used smokeless tobacco.  ETOH:   reports that she does not currently use alcohol. E-cigarette/Vaping       Questions Responses    E-cigarette/Vaping Use Never User    Start Date     Passive Exposure     Quit Date     Counseling Given     Comments               Family History:       Reviewed and negative in regards to presenting illness/complaint. No family history on file. REVIEW OF SYSTEMS COMPLETED:   Pertinent positives as noted in the HPI. All other systems reviewed and negative. PHYSICAL EXAM PERFORMED:    BP (!) 194/136   Pulse (!) 141   Temp 98.7 °F (37.1 °C)   Resp 30   Wt 134 lb 14.7 oz (61.2 kg)   SpO2 99%   BMI 25.49 kg/m²     General appearance: Ill-appearing female in no acute distress  HEENT:  Normal cephalic, atraumatic without obvious deformity. Pupils equal, round, and reactive to light. Extra ocular muscles intact. Conjunctivae/corneas clear. Lips visibly swollen as his tongue. Uvula not well visualized  Neck: Supple, with full range of motion. No jugular venous distention. Trachea midline. Respiratory:  Normal respiratory effort. Clear to auscultation, bilaterally without Rales/Wheezes/Rhonchi. Cardiovascular:  Regular rate and rhythm with normal S1/S2 without murmurs, rubs or gallops. Abdomen: Soft, non-tender, non-distended with normal bowel sounds. Musculoskeletal: Contractured  Skin: Skin color, texture, turgor normal.  No rashes or lesions. Neurologic:  Neurovascularly intact without any focal sensory/motor deficits.  Cranial nerves: II-XII intact, grossly non-focal.  Psychiatric:  Alert and disoriented  Capillary Refill: Brisk,3 seconds, normal  Peripheral Pulses: +2 palpable, equal bilaterally       Labs:     Recent Labs     11/20/22  1206   WBC 14.5*   HGB 8.9*   HCT 29.4*        Recent Labs     11/20/22  1206   *   K 3.8   CL 94*   CO2 26   BUN 18   CREATININE <0.5*   CALCIUM 9.8     Recent Labs     11/20/22  1206   AST 47*   ALT 66*   BILITOT 0.4   ALKPHOS 144*     Recent Labs     11/20/22  1206   INR 1.06     Recent Labs 11/20/22  1206   TROPONINI <0.01       Urinalysis:      Lab Results   Component Value Date/Time    NITRU Negative 11/20/2022 12:08 PM    [address] 0-2 11/20/2022 12:08 PM    BACTERIA None Seen 11/20/2022 12:08 PM    RBCUA 0-2 11/20/2022 12:08 PM    BLOODU Negative 11/20/2022 12:08 PM    SPECGRAV 1.024 11/20/2022 12:08 PM    GLUCOSEU Negative 11/20/2022 12:08 PM       Radiology:         CT SOFT TISSUE NECK W CONTRAST   Preliminary Result   Acute pulmonary emboli. Labial and glossal edema. No pharyngeal or laryngeal edema. 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      Critical results were called by Dr. Noemi Kevin MD to [facility] on   11/20/2022 at 15:39. XR CHEST PORTABLE   Final Result   No acute process. Consults:    None    ASSESSMENT:    Active Hospital Problems    Diagnosis Date Noted    Angioedema of tongue [T78. 3XXA] 11/20/2022     Priority: Medium   Tongue angioedema  Pulmonary emboli  Debility    PLAN:    Family wants admission for symptom management they do not want her to choke to get from angioedema  I have started her on IV Solu-Medrol  Keep n.p.o.  Started on heparin drip for PEs  Palliative care consult  Likely should return to hospice in the next 24 hours if her tongue is less swollen  Haldol other medications other than Roxanol for comfort  DVT Prophylaxis: Heparin  Diet: No diet orders on file  Code Status: Prior    PT/OT Eval Status:     Dispo -DC to hospice tomorrow if tongue is less swollen       Viridiana Willard MD    Thank you Felicia Han MD for the opportunity to be involved in this patient's care. If you have any questions or concerns please feel free to contact me at [phone].

## 2022-11-20 NOTE — PROGRESS NOTES
Pharmacy Medication Reconciliation Note     List of medications Isaias Archer is currently taking is complete. Source of information:   [facility] + call to RN    Notes regarding home medications:   1. Facility RN reports patient is hospice, and all maintenance meds dc'd.  Only on morphine concentrate and Keppra BID    Alex Mcallister, Pharmacy Intern  11/20/2022  4:23 PM

## 2022-11-20 NOTE — ED PROVIDER NOTES
[address] [facility]      Pt Name: Tamara Pina  MRN: [id number]  [place] 1965  Date of evaluation: 11/20/2022  Provider: [facility]       Chief Complaint   Patient presents with    Angioedema     From [facility] with reports that pt was noticed to have oral swelling last night, but was noticed increased angioedema this morning, pt squaded out and was given 50mg IM benadryl 15minutes PTA per EMS          HISTORY OF PRESENT ILLNESS   (Location/Symptom, Timing/Onset, Context/Setting, Quality, Duration, Modifying Factors, Severity)  Note limiting factors. Tamara Pina is a 62 y.o. female who presents [facility] the emergency department [facility] with a report of some oral swelling that was noticed last night. This morning the patient was transported to the emergency department for this problem. She did receive Benadryl to 50 mg IM given by paramedics in route to the hospital.  There is no other historical information is available. The patient is nonverbal.  She has had 2 prior strokes. She is bedridden and nonambulatory. She is noncommunicative. She did have recent admission to the hospital at which time CODE STATUS was changed to DNR comfort care only and she was placed in hospice. The patient does not take any ACE inhibitor's. She is not currently receiving any antibiotics. No recent changes in medications are identified. There is no prior history of angioedema that can be identified. There is no history of any trauma or injury. There is no report of any fever, chills, nausea, vomiting, diarrhea. There is no report of any chest pain or shortness of breath. There is no report of any abdominal pain. No report of any cough or cold symptoms. There are no family members present. Nursing Notes were reviewed.     HPI        REVIEW OF SYSTEMS    (2-9 systems for level 4, 10 or more for level 5)     Review of systems are unobtainable. The patient is nonverbal at baseline. PAST MEDICAL HISTORY   No past medical history on file. SURGICAL HISTORY       Past Surgical History:   Procedure Laterality Date    GASTROSTOMY TUBE PLACEMENT N/A 10/13/2022    ESOPHAGOGASTRODUODENOSCOPY WITH  PERCUTANEOUS ENDOSCOPIC GASTROSTOMY  TUBE PLACEMENT performed by Lucian Pérez MD at [address]  10/13/2022    EGD BIOPSY performed by Lucian Pérez MD at [address] [facility]       Previous Medications    LEVETIRACETAM (KEPPRA) 100 MG/ML SOLUTION    10 mLs by Per G Tube route 2 times daily    MORPHINE SULFATE 20 MG/ML CONCENTRATED ORAL SOLUTION    Take 5 mg by mouth in the morning and at bedtime. MORPHINE SULFATE, CONCENTRATE, 5 MG/0.25ML SOLN    Take 2 mg by mouth 4 times daily as needed (pain). ALLERGIES     Patient has no known allergies. FAMILY HISTORY     No family history on file. SOCIAL HISTORY       Social History     Socioeconomic History    Marital status: Single   Tobacco Use    Smoking status: Never     Passive exposure: Never    Smokeless tobacco: Never   Vaping Use    Vaping Use: Never used   Substance and Sexual Activity    Alcohol use: Not Currently    Drug use: Not Currently    Sexual activity: Not Currently     Partners: Male       SCREENINGS             PHYSICAL EXAM    (up to 7 for level 4, 8 or more for level 5)     ED Triage Vitals [11/20/22 1135]   BP Temp Temp src Heart Rate Resp SpO2 Height Weight   (!) 170/116 98.7 °F (37.1 °C) -- (!) 168 (!) 37 100 % -- 134 lb 14.7 oz (61.2 kg)         Physical Exam   Constitutional: Awake. No acute distress. Appears comfortable. Head: No visible evidence of trauma. Normocephalic. Eyes: Pupils equal and reactive. No photophobia. Conjunctiva normal.    HENT: Oral mucosa moist.  Airway patent.   Edema noted to upper and lower lips as well as the tongue. The tongue was elevated and there was edema noted to the floor the mouth as well. Tongue was not protruding from the mouth. I was able to visualize the uvula and the Palatino arch which appeared normal.  There was no stridor or drooling. The patient was able to swallow her secretions. She was able to breathe through the nose without difficulty. No apparent pharyngeal erythema or exudates. Nares were clear. Neck:  Soft and supple. Nontender. Submental space was normal.  Anterior neck was nontender. Heart: Sinus tachycardia on the monitor. Rate 155. Lungs: A few loose rhonchi but mostly clear to auscultation. No accessory muscle use. Slightly tachypneic. Chest: Chest wall non-tender. No evidence of trauma. Abdomen:  Soft, nondistended, bowel sounds present. G-tube present in the left upper quadrant. Site was clean and dry. Nontender. No guarding rigidity or rebound. No masses. Musculoskeletal: Protective boots on both feet. Contractures with limited mobility noted in both upper extremities. Extremities non-tender with full range of motion. Radial and dorsalis pedis pulses were intact. No calf tenderness erythema or edema. Neurological: Awake. Nonverbal.  Does not follow commands. No apparent facial droop. Corneal reflex was intact. Gag reflex was intact. No visible seizure activity. No tremor. Resists movement in the upper extremities bilaterally. No movement in the lower extremities. Neurological exam appears to be consistent with baseline based on information provided. Skin: Skin is warm and dry. No rash. Lymphatic:  No lympadenopathy. Psychiatric: Unable to assess. DIAGNOSTIC RESULTS     EKG: All EKG's are interpreted by the Emergency Department Physician who either signs or Co-signs this chart in the absence of a cardiologist.    Sinus tachycardia. Rate 168. DE interval 194 ms. QRS duration 76 ms. QTc 434 ms. R axis  degrees.   There is no ST elevation. Baseline artifact was noted. EKG is similar in appearance to a previous EKG from October 31, 2022. RADIOLOGY:   Non-plain film images such as CT, Ultrasound and MRI are read by the radiologist. Plain radiographic images are visualized and preliminarily interpreted by the emergency physician with the below findings:        Interpretation per the Radiologist below, if available at the time of this note:    CT SOFT TISSUE NECK W CONTRAST   Preliminary Result   Acute pulmonary emboli. Labial and glossal edema. No pharyngeal or laryngeal edema. 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      Critical results were called by Dr. Allison Arroyo MD to [facility] on   11/20/2022 at 15:39. XR CHEST PORTABLE   Final Result   No acute process.                ED BEDSIDE ULTRASOUND:   Performed by ED Physician - none    LABS:  Labs Reviewed   CBC WITH AUTO DIFFERENTIAL - Abnormal; Notable for the following components:       Result Value    WBC 14.5 (*)     RBC 3.87 (*)     Hemoglobin 8.9 (*)     Hematocrit 29.4 (*)     MCV 75.9 (*)     MCH 23.1 (*)     MCHC 30.4 (*)     RDW 19.0 (*)     Neutrophils Absolute 12.3 (*)     Lymphocytes Absolute 0.9 (*)     All other components within normal limits   COMPREHENSIVE METABOLIC PANEL W/ REFLEX TO MG FOR LOW K - Abnormal; Notable for the following components:    Sodium 134 (*)     Chloride 94 (*)     Glucose 112 (*)     Creatinine <0.5 (*)     Albumin 3.0 (*)     Albumin/Globulin Ratio 0.6 (*)     Alkaline Phosphatase 144 (*)     ALT 66 (*)     AST 47 (*)     All other components within normal limits   BRAIN NATRIURETIC PEPTIDE - Abnormal; Notable for the following components:    Pro- (*)     All other components within normal limits   BLOOD GAS, VENOUS - Abnormal; Notable for the following components:    pCO2, Greg 52.2 (*)     HCO3, Venous 32 (*)     All other components within normal limits   URINALYSIS WITH REFLEX TO CULTURE - Abnormal; Notable for the following components:    Clarity, UA TURBID (*)     Protein,  (*)     Urobilinogen, Urine 2.0 (*)     All other components within normal limits   MICROSCOPIC URINALYSIS - Abnormal; Notable for the following components:    Crystals, UA Few Triple Phos (*)     All other components within normal limits   COVID-19, RAPID   RAPID INFLUENZA A/B ANTIGENS   CULTURE, BLOOD 1   CULTURE, BLOOD 2   PROTIME-INR   TROPONIN   LACTATE, SEPSIS   LEVETIRACETAM LEVEL   LACTATE, SEPSIS   APTT   APTT       All other labs were within normal range or not returned as of this dictation. EMERGENCY DEPARTMENT COURSE and DIFFERENTIAL DIAGNOSIS/MDM:   Vitals:    Vitals:    11/20/22 1135 11/20/22 1200 11/20/22 1315 11/20/22 1330   BP: (!) 170/116 (!) 149/102 (!) 192/120 (!) 194/136   Pulse: (!) 168 (!) 153 (!) 139 (!) 141   Resp: (!) 37 28 (!) 38 30   Temp: 98.7 °F (37.1 °C)      SpO2: 100% 92% 98% 99%   Weight: 134 lb 14.7 oz (61.2 kg)            MDM        The patient presents to the emergency department with a complaint of angioedema that was noticed last night by staff at the [facility]. She did receive Benadryl 50 mg IM given by paramedics in route to the hospital.  She was given Decadron 10 mg IV and Pepcid 20 mg IV at the time of my exam.    Patient was significantly tachycardic at the time of initial exam with heart rate in the 160s. At the time of my examination her heart rate was 155. She was given a fluid bolus of normal saline. She has had a history of sepsis and antibiotics were given to cover the possibility of sepsis. Prior records were reviewed from the patient's most recent hospitalization. She was admitted on October 31 and discharged on November 1. Medical history is significant for developmental delay, seizure disorder, strokes, sickle cell disease. She presented with tachycardia, septic shock, and hypotension requiring pressors.   There was a question of possible pancreatic malignancy found on CT abdomen. After discussion with the patient's niece according to records, the patient was made DNR CC comfort care and was placed on hospice care. Is this patient to be included in the SEP-1 Core Measure due to severe sepsis or septic shock? Yes   SEP-1 CORE MEASURE DATA      Sepsis Criteria   Severe Sepsis Criteria   Septic Shock Criteria     Must be confirmed or suspected to move forward with diagnosis of sepsis. Must meet 2:    [] Temperature > 100.9 F (38.3 C)        or < 96.8 F (36 C)  [x] HR > 90  [x] RR > 20  [x] WBC > 12 or < 4 or 10% bands      AND:      [x] Infection Confirmed or        Suspected. Must meet 1:    [] Lactate > 2       or   [] Signs of Organ Dysfunction:    - SBP < 90 or MAP < 65  - Altered mental status  - Creatinine > 2 or increased from      baseline  - Urine Output < 0.5 ml/kg/hr  - Bilirubin > 2  - INR > 1.5 (not anticoagulated)  - Platelets < 039,212  - Acute Respiratory Failure as     evidenced by new need for NIPPV     or mechanical ventilation      [x] No criteria met for Severe Sepsis. Must meet 1:    [] Lactate > 4        or   [] SBP < 90 or MAP < 65 for at        least two readings in the first        hour after fluid bolus        administration      [] Vasopressors initiated (if hypotension persists after fluid resuscitation)        [x] No criteria met for Septic Shock.    Patient Vitals for the past 6 hrs:   BP Temp Pulse Resp SpO2 Weight Weight Method Percent Weight Change   11/20/22 1135 (!) 170/116 98.7 °F (37.1 °C) (!) 168 (!) 37 100 % 134 lb 14.7 oz (61.2 kg) Actual 0   11/20/22 1200 (!) 149/102 -- (!) 153 28 92 % -- -- --   11/20/22 1315 (!) 192/120 -- (!) 139 (!) 38 98 % -- -- --   11/20/22 1330 (!) 194/136 -- (!) 141 30 99 % -- -- --      Recent Labs     11/20/22  1206   WBC 14.5*   CREATININE <0.5*   BILITOT 0.4   INR 1.06            Time Sepsis Identified: 2:31 PM    Fluid Resuscitation Rational: at least 30mL/kg based on entered actual weight at time of triage      Repeat lactate level: ordered and pending at this time    Reassessment Exam:   Not applicable. Patient does not have septic shock. REASSESSMENT          2:01 PM: Chest x-ray is negative. No infiltrate. No evidence of aspiration. Rapid influenza and rapid COVID test were negative. She does have a history of seizures and Keppra level is therapeutic. Urinalysis is negative. White blood cell count is elevated at 14.5. Hemoglobin is 8.9 which is stable. Platelet count is normal.  Glucose 112. Creatinine less than 0.05. Liver function test are slightly elevated but this is improved compared to prior numbers. INR is normal.  Troponin is normal.  Lactate is 1.6.  pH 7.39. I had a lengthy discussion with the patient's son Juan Casey by telephone. He lives in a different state. Although he is familiar somewhat with his mother's care, her aunt Huong Dong has been her primary healthcare decision-maker. I attempted to contact Huong Dong by telephone and left a message. There was no answer. I did advise her son of the critical nature of her condition. He does wish that she receive comfort care measures. She will be admitted. However, based on previous decisions of DNR comfort care, and recently being placed in hospice care, with recent diagnosis of possible pancreatic mass, the patient is not a candidate for intubation, cricothyrotomy, tracheotomy. IV steroids and medications will be continued to treat her angioedema. 3:40 PM: CT neck soft tissue was discussed with the radiologist.  There is labial and glossal edema but there is no evidence of pharyngeal or laryngeal edema. Nasopharynx and posterior pharynx are patent. Incidentally, the patient was noted to have evidence of acute pulmonary emboli within the right main pulmonary artery, lobar and segmental arteries. Heparin bolus and drip was ordered.     I did speak with Jesse Nova from [facility]. The patient is currently in hospice care. She is DNR comfort care only. I would consider IV medications to reduce swelling as part of comfort care measures. She will be admitted for IV steroids. A call was placed to the hospitalist on-call for admission. Care was discussed with the hospitalist Dr. Ivan Kilgore. CRITICAL CARE TIME   Total Critical Care time was 45 minutes, excluding separately reportable procedures. There was a high probability of clinically significant/life threatening deterioration in the patient's condition which required my urgent intervention. CONSULTS:  None    PROCEDURES:  Unless otherwise noted below, none     Procedures        FINAL IMPRESSION      1. Angioedema, initial encounter    2. Other acute pulmonary embolism without acute cor pulmonale ([facility] [address]          DISPOSITION/PLAN   DISPOSITION Decision To Admit 11/20/2022 03:13:42 PM      PATIENT REFERRED TO:  No follow-up provider specified. DISCHARGE MEDICATIONS:  New Prescriptions    No medications on file     Controlled Substances Monitoring:     No flowsheet data found. (Please note that portions of this note were completed with a voice recognition program.  Efforts were made to edit the dictations but occasionally words are mis-transcribed. )    1859 Ray Goode DO (electronically signed)  Attending Emergency Physician           Patricia Clay DO  11/20/22 8080

## 2022-11-20 NOTE — ED NOTES
Report called to    Kenisha LGEASON   To Room  4260  Cardiac monitor on during transfer  Pt's pain level   denies  VSS, Afebrile   IV site is clean, dry and intact, heparin gtt at 11ml/hr  Family updated on transfer          Daniele Ferraro RN  11/20/22 7566

## 2022-11-21 VITALS
WEIGHT: 134.92 LBS | BODY MASS INDEX: 25.49 KG/M2 | HEART RATE: 76 BPM | SYSTOLIC BLOOD PRESSURE: 146 MMHG | RESPIRATION RATE: 16 BRPM | TEMPERATURE: 97.4 F | DIASTOLIC BLOOD PRESSURE: 95 MMHG | OXYGEN SATURATION: 97 %

## 2022-11-21 LAB
APTT: 54.7 SEC (ref 23–34.3)
APTT: 91.7 SEC (ref 23–34.3)
EKG ATRIAL RATE: 168 BPM
EKG DIAGNOSIS: NORMAL
EKG P AXIS: 55 DEGREES
EKG P-R INTERVAL: 94 MS
EKG Q-T INTERVAL: 260 MS
EKG QRS DURATION: 76 MS
EKG QTC CALCULATION (BAZETT): 434 MS
EKG R AXIS: 245 DEGREES
EKG T AXIS: 83 DEGREES
EKG VENTRICULAR RATE: 168 BPM

## 2022-11-21 PROCEDURE — 2580000003 HC RX 258: Performed by: INTERNAL MEDICINE

## 2022-11-21 PROCEDURE — 2500000003 HC RX 250 WO HCPCS: Performed by: INTERNAL MEDICINE

## 2022-11-21 PROCEDURE — 6360000002 HC RX W HCPCS: Performed by: STUDENT IN AN ORGANIZED HEALTH CARE EDUCATION/TRAINING PROGRAM

## 2022-11-21 PROCEDURE — 6370000000 HC RX 637 (ALT 250 FOR IP): Performed by: INTERNAL MEDICINE

## 2022-11-21 PROCEDURE — 85730 THROMBOPLASTIN TIME PARTIAL: CPT

## 2022-11-21 PROCEDURE — 93010 ELECTROCARDIOGRAM REPORT: CPT | Performed by: INTERNAL MEDICINE

## 2022-11-21 PROCEDURE — 6370000000 HC RX 637 (ALT 250 FOR IP): Performed by: NURSE PRACTITIONER

## 2022-11-21 PROCEDURE — 6360000002 HC RX W HCPCS: Performed by: INTERNAL MEDICINE

## 2022-11-21 PROCEDURE — 36415 COLL VENOUS BLD VENIPUNCTURE: CPT

## 2022-11-21 RX ORDER — HEPARIN SODIUM 1000 [USP'U]/ML
4900 INJECTION, SOLUTION INTRAVENOUS; SUBCUTANEOUS ONCE
Status: COMPLETED | OUTPATIENT
Start: 2022-11-21 | End: 2022-11-21

## 2022-11-21 RX ORDER — LEVETIRACETAM 100 MG/ML
1000 SOLUTION ORAL 2 TIMES DAILY
Status: DISCONTINUED | OUTPATIENT
Start: 2022-11-21 | End: 2022-11-21

## 2022-11-21 RX ORDER — ONDANSETRON 4 MG/1
4 TABLET, ORALLY DISINTEGRATING ORAL EVERY 8 HOURS PRN
Qty: 12 TABLET | Refills: 0 | Status: SHIPPED | OUTPATIENT
Start: 2022-11-21

## 2022-11-21 RX ORDER — FAMOTIDINE 20 MG/1
20 TABLET, FILM COATED ORAL DAILY
Status: DISCONTINUED | OUTPATIENT
Start: 2022-11-21 | End: 2022-11-21 | Stop reason: HOSPADM

## 2022-11-21 RX ORDER — FAMOTIDINE 20 MG/1
20 TABLET, FILM COATED ORAL DAILY
Qty: 60 TABLET | Refills: 3 | Status: SHIPPED | OUTPATIENT
Start: 2022-11-21

## 2022-11-21 RX ORDER — LEVETIRACETAM 100 MG/ML
1000 SOLUTION ORAL 2 TIMES DAILY
Status: DISCONTINUED | OUTPATIENT
Start: 2022-11-21 | End: 2022-11-21 | Stop reason: HOSPADM

## 2022-11-21 RX ORDER — MORPHINE SULFATE 10 MG/.5ML
5 SOLUTION ORAL 2 TIMES DAILY
Status: DISCONTINUED | OUTPATIENT
Start: 2022-11-21 | End: 2022-11-21 | Stop reason: HOSPADM

## 2022-11-21 RX ORDER — LEVETIRACETAM 100 MG/ML
1000 SOLUTION ORAL 2 TIMES DAILY
Qty: 473 ML | Refills: 3 | Status: SHIPPED | OUTPATIENT
Start: 2022-11-21

## 2022-11-21 RX ORDER — MORPHINE SULFATE 10 MG/.5ML
5 SOLUTION ORAL 2 TIMES DAILY
Qty: 240 ML | Refills: 0 | Status: SHIPPED | OUTPATIENT
Start: 2022-11-21 | End: 2022-11-24

## 2022-11-21 RX ORDER — DIPHENHYDRAMINE HCL 12.5MG/5ML
25 LIQUID (ML) ORAL 4 TIMES DAILY PRN
Qty: 180 ML | Refills: 2 | Status: SHIPPED | OUTPATIENT
Start: 2022-11-21

## 2022-11-21 RX ORDER — PREDNISONE 10 MG/1
TABLET ORAL
Qty: 30 TABLET | Refills: 0 | Status: SHIPPED | OUTPATIENT
Start: 2022-11-21

## 2022-11-21 RX ADMIN — LEVETIRACETAM 1000 MG: 500 SOLUTION ORAL at 15:47

## 2022-11-21 RX ADMIN — METHYLPREDNISOLONE SODIUM SUCCINATE 40 MG: 40 INJECTION, POWDER, FOR SOLUTION INTRAMUSCULAR; INTRAVENOUS at 04:59

## 2022-11-21 RX ADMIN — MORPHINE SULFATE 2 MG: 10 SOLUTION ORAL at 03:08

## 2022-11-21 RX ADMIN — Medication 10 ML: at 09:56

## 2022-11-21 RX ADMIN — HEPARIN SODIUM 1340 UNITS/HR: 10000 INJECTION, SOLUTION INTRAVENOUS at 12:56

## 2022-11-21 RX ADMIN — FAMOTIDINE 20 MG: 20 TABLET, FILM COATED ORAL at 15:47

## 2022-11-21 RX ADMIN — HEPARIN SODIUM 1340 UNITS/HR: 10000 INJECTION, SOLUTION INTRAVENOUS at 01:21

## 2022-11-21 RX ADMIN — HYDROMORPHONE HYDROCHLORIDE 0.25 MG: 1 INJECTION, SOLUTION INTRAMUSCULAR; INTRAVENOUS; SUBCUTANEOUS at 09:54

## 2022-11-21 RX ADMIN — MORPHINE SULFATE 5 MG: 10 SOLUTION ORAL at 11:01

## 2022-11-21 RX ADMIN — APIXABAN 10 MG: 5 TABLET, FILM COATED ORAL at 15:47

## 2022-11-21 RX ADMIN — METHYLPREDNISOLONE SODIUM SUCCINATE 40 MG: 40 INJECTION, POWDER, FOR SOLUTION INTRAMUSCULAR; INTRAVENOUS at 13:33

## 2022-11-21 RX ADMIN — HEPARIN SODIUM 4900 UNITS: 1000 INJECTION INTRAVENOUS; SUBCUTANEOUS at 01:19

## 2022-11-21 SDOH — HEALTH STABILITY: PHYSICAL HEALTH

## 2022-11-21 SDOH — ECONOMIC STABILITY: INCOME INSECURITY

## 2022-11-21 SDOH — ECONOMIC STABILITY: TRANSPORTATION INSECURITY

## 2022-11-21 SDOH — ECONOMIC STABILITY: FOOD INSECURITY

## 2022-11-21 SDOH — ECONOMIC STABILITY: HOUSING INSECURITY

## 2022-11-21 ASSESSMENT — PAIN SCALES - GENERAL
PAINLEVEL_OUTOF10: 0

## 2022-11-21 ASSESSMENT — PAIN SCALES - WONG BAKER
WONGBAKER_NUMERICALRESPONSE: 0

## 2022-11-21 ASSESSMENT — PAIN DESCRIPTION - ORIENTATION
ORIENTATION: OTHER (COMMENT)

## 2022-11-21 ASSESSMENT — PAIN DESCRIPTION - DESCRIPTORS
DESCRIPTORS: OTHER (COMMENT)

## 2022-11-21 ASSESSMENT — PAIN DESCRIPTION - LOCATION
LOCATION: OTHER (COMMENT)

## 2022-11-21 NOTE — CARE COORDINATION
11/21/22 1350   Readmission Assessment   Number of Days since last admission? 8-30 days   Previous Disposition Long Term Care   Who is being Interviewed Caregiver  (Son Earnest Miller)   What was the patient's/caregiver's perception as to why they think they needed to return back to the hospital? Other (Comment)  (tongue and lip swelling)   Did you visit your Primary Care Physician after you left the hospital, before you returned this time? Yes   Did you see a specialist, such as Cardiac, Pulmonary, Orthopedic Physician, etc. after you left the hospital? No   Who advised the patient to return to the hospital? Other (Comment)  (Long term care staff)   Does the patient report anything that got in the way of taking their medications?  No   MP Foster LYNDSEY  [phone]  Electronically signed by Diana Arshad on 11/21/2022 at 1:51 PM

## 2022-11-21 NOTE — PROGRESS NOTES
Attempted to call [facility] to provide report. This attempt was unsuccessful. Will attempt again before pt. Discharges.  Electronically signed by Catina Hightower RN on 11/21/2022 at 4:54 PM

## 2022-11-21 NOTE — CARE COORDINATION
Patient came from [facility] long term care prior to arrival.  Call to Girard Lennox, [phone], at [facility] left requesting a return call. [x] Confirmed with the patient or family that the plan is to return to this facility at D/C. SHANTEL contacted pts Isidoro armstrong [phone] who confirmed the plan is for this pt to return to [facility] with [address] upon d/c.    SHANTEL contacted [facility] [address] [place] [address] and spoke with Danna Bear. SHANTEL informed Danna Bear this pt was in the hospital. Danna Bear confirmed this pt is still enrolled in Hospice services as this hospitalization was not related to pts hospice diagnoses. Hospice has requested to be contacted upon pts return to the nursing home. PLAN: Return to [address] with resumption of [address] services upon d/c. Will need to update HOC and pts Isidoro armstrong. Will need stretcher transport.      Electronically signed by Ghazala Cruz on 11/21/2022 at 1:39 PM

## 2022-11-21 NOTE — FLOWSHEET NOTE
Patient needs to be transferred to her air mattress and in pain, crying out and moving bilateral lower ext around in the bed. MD notified with new orders for pain control.

## 2022-11-21 NOTE — PLAN OF CARE
Problem: Discharge Planning  Goal: Discharge to home or other facility with appropriate resources  Outcome: Progressing  Flowsheets  Taken 11/21/2022 0204  Discharge to home or other facility with appropriate resources:   Identify barriers to discharge with patient and caregiver   Identify discharge learning needs (meds, wound care, etc)   Refer to discharge planning if patient needs post-hospital services based on physician order or complex needs related to functional status, cognitive ability or social support system   Arrange for needed discharge resources and transportation as appropriate   Arrange for interpreters to assist at discharge as needed  Taken 11/20/2022 2330  Discharge to home or other facility with appropriate resources: Identify barriers to discharge with patient and caregiver     Problem: Pain  Goal: Verbalizes/displays adequate comfort level or baseline comfort level  Outcome: Progressing  Flowsheets (Taken 11/21/2022 0147)  Verbalizes/displays adequate comfort level or baseline comfort level:   Assess pain using appropriate pain scale   Administer analgesics based on type and severity of pain and evaluate response   Implement non-pharmacological measures as appropriate and evaluate response   Consider cultural and social influences on pain and pain management   Notify Licensed Independent Practitioner if interventions unsuccessful or patient reports new pain     Problem: Safety - Adult  Goal: Free from fall injury  Outcome: Progressing     Problem: ABCDS Injury Assessment  Goal: Absence of physical injury  Outcome: Progressing  Flowsheets (Taken 11/21/2022 0158)  Absence of Physical Injury: Implement safety measures based on patient assessment     Problem: Skin/Tissue Integrity  Goal: Absence of new skin breakdown  Description: 1. Monitor for areas of redness and/or skin breakdown  2. Assess vascular access sites hourly  3. Every 4-6 hours minimum:  Change oxygen saturation probe site  4. Every 4-6 hours:  If on nasal continuous positive airway pressure, respiratory therapy assess nares and determine need for appliance change or resting period. Outcome: Progressing     Problem: Neurosensory - Adult  Goal: Achieves stable or improved neurological status  Outcome: Progressing  Flowsheets (Taken 11/20/2022 2330)  Achieves stable or improved neurological status: Assess for and report changes in neurological status  Goal: Absence of seizures  Outcome: Progressing  Flowsheets (Taken 11/20/2022 2330)  Absence of seizures: Monitor for seizure activity.   If seizure occurs, document type and location of movements and any associated apnea  Goal: Remains free of injury related to seizures activity  Outcome: Progressing  Flowsheets (Taken 11/20/2022 2330)  Remains free of injury related to seizure activity: Maintain airway, patient safety  and administer oxygen as ordered  Goal: Achieves maximal functionality and self care  Outcome: Progressing  Flowsheets (Taken 11/20/2022 2330)  Achieves maximal functionality and self care: Monitor swallowing and airway patency with patient fatigue and changes in neurological status     Problem: Respiratory - Adult  Goal: Achieves optimal ventilation and oxygenation  Outcome: Progressing  Flowsheets (Taken 11/20/2022 2330)  Achieves optimal ventilation and oxygenation:   Assess for changes in respiratory status   Assess for changes in mentation and behavior   Position to facilitate oxygenation and minimize respiratory effort     Problem: Cardiovascular - Adult  Goal: Maintains optimal cardiac output and hemodynamic stability  Outcome: Progressing  Flowsheets (Taken 11/20/2022 2330)  Maintains optimal cardiac output and hemodynamic stability:   Monitor blood pressure and heart rate   Monitor urine output and notify Licensed Independent Practitioner for values outside of normal range   Assess for signs of decreased cardiac output  Goal: Absence of cardiac dysrhythmias or at baseline  Outcome: Progressing  Flowsheets (Taken 11/20/2022 2330)  Absence of cardiac dysrhythmias or at baseline: Monitor cardiac rate and rhythm     Problem: Skin/Tissue Integrity - Adult  Goal: Skin integrity remains intact  Outcome: Progressing  Flowsheets (Taken 11/20/2022 2330)  Skin Integrity Remains Intact:   Monitor for areas of redness and/or skin breakdown   Assess vascular access sites hourly  Goal: Incisions, wounds, or drain sites healing without S/S of infection  Outcome: Progressing  Goal: Oral mucous membranes remain intact  Outcome: Progressing     Problem: Musculoskeletal - Adult  Goal: Return mobility to safest level of function  Outcome: Progressing  Flowsheets (Taken 11/20/2022 2330)  Return Mobility to Safest Level of Function:   Assess patient stability and activity tolerance for standing, transferring and ambulating with or without assistive devices   Assist with transfers and ambulation using safe patient handling equipment as needed   Ensure adequate protection for wounds/incisions during mobilization  Goal: Maintain proper alignment of affected body part  Outcome: Progressing  Flowsheets (Taken 11/20/2022 2330)  Maintain proper alignment of affected body part: Support and protect limb and body alignment per provider's orders  Goal: Return ADL status to a safe level of function  Outcome: Progressing  Flowsheets (Taken 11/20/2022 2330)  Return ADL Status to a Safe Level of Function: Administer medication as ordered     Problem: Genitourinary - Adult  Goal: Urinary catheter remains patent  Outcome: Progressing  Flowsheets (Taken 11/20/2022 2330)  Urinary catheter remains patent: Assess patency of urinary catheter     Problem: Infection - Adult  Goal: Absence of infection at discharge  Outcome: Progressing  Goal: Absence of infection during hospitalization  Outcome: Progressing  Goal: Absence of fever/infection during anticipated neutropenic period  Outcome: Progressing     Problem: Metabolic/Fluid and Electrolytes - Adult  Goal: Electrolytes maintained within normal limits  Outcome: Progressing  Flowsheets (Taken 11/20/2022 2330)  Electrolytes maintained within normal limits: Monitor labs and assess patient for signs and symptoms of electrolyte imbalances  Goal: Hemodynamic stability and optimal renal function maintained  Outcome: Progressing  Flowsheets (Taken 11/20/2022 2330)  Hemodynamic stability and optimal renal function maintained: Monitor labs and assess for signs and symptoms of volume excess or deficit     Problem: Hematologic - Adult  Goal: Maintains hematologic stability  Outcome: Progressing

## 2022-11-21 NOTE — PROGRESS NOTES
Pt picked up by [address] transport to return to [facility]. Attempted to call report to [facility] unsuccessfully x2. Pt's IV removed and AVS given to transport.  Electronically signed by Jhonny Vasques RN on 11/21/2022 at 5:46 PM

## 2022-11-21 NOTE — PROGRESS NOTES
Clinical Pharmacy Note  Heparin Dosing       Lab Results   Component Value Date/Time    APTT 91.7 11/21/2022 08:24 AM     Lab Results   Component Value Date/Time    HGB 8.9 11/20/2022 12:06 PM    HCT 29.4 11/20/2022 12:06 PM     11/20/2022 12:06 PM    INR 1.06 11/20/2022 12:06 PM       Current Infusion Rate: 1340 units/hr    Plan:  Rate: Continue  1340 units/hr  Next aPTT: 1430 11/21/22    Pharmacy will continue to monitor and adjust based on aPTT results.     Leonel Kitchen [facility]  11/21/2022 9:54 AM

## 2022-11-21 NOTE — DISCHARGE INSTR - COC
Continuity of Care Form    Patient Name: Hortencia Stephens   :  1965  MRN:  [id number]    Admit date:  2022  Discharge date:  2022    Code Status Order: DNR-CC   Advance Directives:     Admitting Physician:  Mary Lou Logan MD  PCP: Gonzalo Leblanc MD    Discharging Nurse: [facility] Unit/Room#: S9C-4551/1824-17  Discharging Unit Phone Number: [phone]    Emergency Contact:   Extended Emergency Contact Information  Primary Emergency Contact: [address] Phone: [phone]  Mobile Phone: [phone]  Relation: Child  Secondary Emergency Contact: [address] Phone: [phone]  Relation: Aunt/Uncle    Past Surgical History:  Past Surgical History:   Procedure Laterality Date    GASTROSTOMY TUBE PLACEMENT N/A 10/13/2022    ESOPHAGOGASTRODUODENOSCOPY WITH  PERCUTANEOUS ENDOSCOPIC GASTROSTOMY  TUBE PLACEMENT performed by Margie Farias MD at [place] [address]  10/13/2022    EGD BIOPSY [facility]ie F[facility] MD [facility] END[facility]COPY       Immunization History:   Immunization History   Administered Date(s) Administered    COVID-19, PFIZER PURPLE top, DILUTE for use, (age 15 y+), 30mcg/0.3mL 2021, 2021, 2021       Active Problems:  Patient Active Problem List   Diagnosis Code    Altered mental status R41.82    Hypernatremia E87.0    Hypokalemia E87.6    GARRETT (acute kidney injury) ([facility] [address] N17.9    Severe malnutrition ([facility] [address] E43    Acute encephalopathy G93.40    AMS (altered mental status) R41.82    Septicemia (HCC) A41.9    Angioedema of tongue T78. 3XXA       Isolation/Infection:   Isolation            No Isolation          Patient Infection Status       Infection Onset Added Last Indicated Last Indicated By Review Planned Expiration Resolved Resolved By    None active    Resolved    COVID-19 (Rule Out) 22 COVID-19, Rapid (Ordered)   22 Rule-Out Test Resulted COVID-19 10/31/22 10/31/22 10/31/22 COVID-19, Rapid   11/11/21 Major Ripple    Negative Test 10/31    COVID-19 (Rule Out) 10/31/22 10/31/22 10/31/22 COVID-19, Rapid (Ordered)   10/31/22 Rule-Out Test Resulted    COVID-19 (Rule Out) 10/03/22 10/03/22 10/03/22 Respiratory Panel, Molecular, with COVID-19 (Restricted: peds pts or suitable admitted adults) (Ordered)   10/03/22 Rule-Out Test Resulted            Nurse Assessment:  Last Vital Signs: BP (!) 146/95   Pulse 76   Temp 97.4 °F (36.3 °C) (Axillary)   Resp 16   Wt 134 lb 14.7 oz (61.2 kg)   SpO2 97%   BMI 25.49 kg/m²     Last documented pain score (0-10 scale): Pain Level: 0  Last Weight:   Wt Readings from Last 1 Encounters:   11/20/22 134 lb 14.7 oz (61.2 kg)     Mental Status:  disoriented    IV Access:  - None    Nursing Mobility/ADLs:  Walking   Dependent  Transfer  Dependent  Bathing  Dependent  Dressing  Dependent  Toileting  Dependent  Feeding  Dependent  Med Admin  Dependent  Med Delivery   crushed    Wound Care Documentation and Therapy:  Wound 11/20/22 Coccyx Mid Pressure area (Active)   Wound Image   11/21/22 1258   Wound Etiology Pressure Stage 4 11/21/22 1258   Dressing Status New dressing applied 11/21/22 1258   Wound Cleansed Vashe 11/21/22 1258   Dressing/Treatment Moist to dry;ABD 11/21/22 1258   Dressing Change Due 11/21/22 11/21/22 1258   Wound Length (cm) 5 cm 11/21/22 1258   Wound Width (cm) 4 cm 11/21/22 1258   Wound Depth (cm) 2 cm 11/21/22 1258   Wound Surface Area (cm^2) 20 cm^2 11/21/22 1258   Change in Wound Size % (l*w) -19.05 11/21/22 1258   Wound Volume (cm^3) 40 cm^3 11/21/22 1258   Wound Healing % 5 11/21/22 1258   Wound Assessment Exposed structure bone;Slough;Pink/red 11/21/22 1258   Drainage Amount Small 11/21/22 1258   Drainage Description Serosanguinous 11/21/22 1258   Odor Moderate 11/21/22 1258   Carmen-wound Assessment Denuded 11/21/22 1258   Number of days: 0       Wound 11/21/22 Heel Right (Active)   Wound Image   11/21/22 1258   Wound Etiology Deep tissue/Injury 11/21/22 1258   Dressing/Treatment Barrier film 11/21/22 1258   Wound Length (cm) 3.5 cm 11/21/22 1258   Wound Width (cm) 5 cm 11/21/22 1258   Wound Surface Area (cm^2) 17.5 cm^2 11/21/22 1258   Wound Assessment Eschar dry 11/21/22 1258   Carmen-wound Assessment Dry/flaky 11/21/22 1258   Number of days: 0        Elimination:  Continence: Bowel: No  Bladder: No  Urinary Catheter: Insertion Date: 11/20/22    Colostomy/Ileostomy/Ileal Conduit: No       Date of Last BM: 11/20/22    Intake/Output Summary (Last 24 hours) at 11/21/2022 1353  Last data filed at 11/21/2022 0505  Gross per 24 hour   Intake 201.23 ml   Output 950 ml   Net -748.77 ml     I/O last 3 completed shifts: In: 201.2 [I.V.:151.2; IV Piggyback:50]  Out: 950 [Urine:950]    Safety Concerns:     History of Seizures    Impairments/Disabilities:      Speech, Language Barrier - non-verbal, and Contractures - bilateral upper extremeties    Nutrition Therapy:  Current Nutrition Therapy:   - NPO    Routes of Feeding: Gastrostomy Tube  Liquids: No Liquids  Daily Fluid Restriction: no  Last Modified Barium Swallow with Video (Video Swallowing Test): not done    Treatments at the Time of Hospital Discharge:   Respiratory Treatments: Please see AVS  Oxygen Therapy:  is not on home oxygen therapy.   Ventilator:    - No ventilator support    Rehab Therapies: n/a  Weight Bearing Status/Restrictions: Non weight bearing bilaterally  Other Medical Equipment (for information only, NOT a DME order):  n/a  Other Treatments: n/a    Patient's personal belongings (please select all that are sent with patient):  None    RN SIGNATURE:  Electronically signed by Kamlesh Vazquez RN on 11/21/22 at 4:03 PM EST    CASE MANAGEMENT/SOCIAL WORK SECTION    Inpatient Status Date: 11/20/2022    Readmission Risk Assessment Score:  Readmission Risk              Risk of Unplanned Readmission:  20           [facility]  Address:  [address]   Phone:  [phone] [place] [address]    / signature: Electronically signed by Corinne Santoro on 11/21/22 at 3:17 PM EST    PHYSICIAN SECTION    Prognosis: Poor    Condition at Discharge: Stable    Rehab Potential (if transferring to Rehab): Poor    Recommended Labs or Other Treatments After Discharge: RN    Physician Certification: I certify the above information and transfer of Lilia Friedman  is necessary for the continuing treatment of the diagnosis listed and that she requires [facility] with Hospice for less 30 days.      Update Admission H&P: No change in H&P    PHYSICIAN SIGNATURE:  Electronically signed by Normajean Denver, APRN - CNP on 11/21/22 at 1:53 PM EST/ Dr. Ivanna Maciel

## 2022-11-21 NOTE — PROGRESS NOTES
Clinical Pharmacy Note  Heparin Dosing       Lab Results   Component Value Date/Time    APTT 39.1 11/20/2022 11:15 PM     Lab Results   Component Value Date/Time    HGB 8.9 11/20/2022 12:06 PM    HCT 29.4 11/20/2022 12:06 PM     11/20/2022 12:06 PM    INR 1.06 11/20/2022 12:06 PM       Current Infusion Rate: 1100 units/hr    Plan:  Bolus: 4900 units  Rate: increase to 1340 units/hr  Next aPTT: 0800 11/21/22    Pharmacy will continue to monitor and adjust based on aPTT results.   Mis De La Garza, PharmD

## 2022-11-21 NOTE — PROGRESS NOTES
4 Eyes Skin Assessment     NAME:  Yoselyn Neville  YOB: 1965  MEDICAL RECORD NUMBER:  [id number]    The patient is being assessed for  Admission    I agree that One RN have performed a thorough Head to Toe Skin Assessment on the patient. ALL assessment sites listed below have been assessed. Areas assessed by both nurses:    Head, Face, Ears, Shoulders, Back, Chest, Arms, Elbows, Hands, Sacrum. Buttock, Coccyx, Ischium, and Legs. Feet and Heels        Does the Patient have a Wound? Yes wound(s) were present on assessment.  LDA wound assessment was Initiated and completed by RN       Cedric Prevention initiated by RN: Yes   Wound Care Orders initiated by RN: Yes    Pressure Injury (Stage 3,4, Unstageable, DTI, NWPT, and Complex wounds) if present place referral order by RN under : Yes    New and Established Ostomies, if present place, referral order under : No      Nurse 1 eSignature: Electronically signed by Harpal Aguiar RN on 11/21/22 at 1:44 AM EST    **SHARE this note so that the co-signing nurse is able to place an eSignature**    Nurse 2 eSignature: Electronically signed by Rhiannon Sarmiento RN on 11/21/22 at 5:14 AM EST

## 2022-11-21 NOTE — FLOWSHEET NOTE
Attempted to turn and reposition patient and she started to cry out, PRN Morphine given awaiting effects before repositioning. Patient is resting in bed with both eyes closed at this time.

## 2022-11-21 NOTE — CARE COORDINATION
[facility]ound Ostomy Continence Nurse  Consult Note       NAME:  Herb De La Paz  MEDICAL RECORD NUMBER:  [id number]  AGE: 62 y.o. GENDER: female  : 1965  TODAY'S DATE:  2022    Subjective   Reason for WOCN Evaluation and Assessment: Wounds POA      Yoselyn Neville is a 62 y.o. female referred by:   [] Physician  [x] Nursing  [] Other:     Wound Identification:  Wound Type: pressure  Contributing Factors: chronic pressure, decreased mobility, incontinence of stool, and incontinence of urine    Wound History: Wounds POA. Pt from nursing home. Last seen by inpatient wound care on  where pt was thought to have MASD. Now sacral wound has evolved to a stage 4 with palpable bone. Current Wound Care Treatment:  N/A    Patient Goal of Care:  [x] Wound Healing  [] Odor Control  [] Palliative Care  [] Pain Control   [] Other:         PAST MEDICAL HISTORY    No past medical history on file. PAST SURGICAL HISTORY    Past Surgical History:   Procedure Laterality Date    GASTROSTOMY TUBE PLACEMENT N/A 10/13/2022    ESOPHAGOGASTRODUODENOSCOPY WITH  PERCUTANEOUS ENDOSCOPIC GASTROSTOMY  TUBE PLACEMENT performed by Rupinder Quijano MD at [address] [place] [address]  10/13/2022    EGD BIOPSY performed by Rupinder Quijano MD at [facility]    No family history on file. SOCIAL HISTORY    Social History     Tobacco Use    Smoking status: Never     Passive exposure: Never    Smokeless tobacco: Never   Vaping Use    Vaping Use: Never used   Substance Use Topics    Alcohol use: Not Currently    Drug use: Not Currently       ALLERGIES    No Known Allergies    MEDICATIONS    No current facility-administered medications on file prior to encounter. Current Outpatient Medications on File Prior to Encounter   Medication Sig Dispense Refill    morphine sulfate 20 MG/ML concentrated oral solution Take 5 mg by mouth in the morning and at bedtime. levETIRAcetam (KEPPRA) 100 MG/ML solution Take 1,000 mg by mouth 2 times daily         Objective    BP (!) 146/95   Pulse 76   Temp 97.4 °F (36.3 °C) (Axillary)   Resp 16   Wt 134 lb 14.7 oz (61.2 kg)   SpO2 97%   BMI 25.49 kg/m²     LABS:  WBC:    Lab Results   Component Value Date/Time    WBC 14.5 11/20/2022 12:06 PM     H/H:    Lab Results   Component Value Date/Time    HGB 8.9 11/20/2022 12:06 PM    HCT 29.4 11/20/2022 12:06 PM     PTT:    Lab Results   Component Value Date/Time    APTT 91.7 11/21/2022 08:24 AM   [APTT}  PT/INR:    Lab Results   Component Value Date/Time    PROTIME 13.8 11/20/2022 12:06 PM    INR 1.06 11/20/2022 12:06 PM     HgBA1c:    Lab Results   Component Value Date/Time    LABA1C 5.4 10/07/2022 05:55 AM       Assessment   Cedric Risk Score: Cedric Scale Score: 11    Patient Active Problem List   Diagnosis Code    Altered mental status R41.82    Hypernatremia E87.0    Hypokalemia E87.6    GARRETT (acute kidney injury) ([facility] [address] N17.9    Severe malnutrition (HCC) E43    Acute encephalopathy G93.40    AMS (altered mental status) R41.82    Septicemia (HCC) A41.9    Angioedema of tongue T78. 3XXA       Measurements:  Wound 11/20/22 Coccyx Mid Pressure area (Active)   Wound Image   11/21/22 1258   Wound Etiology Pressure Stage 4 11/21/22 1258   Dressing Status New dressing applied 11/21/22 1258   Wound Cleansed Vashe 11/21/22 1258   Dressing/Treatment Moist to dry;ABD 11/21/22 1258   Dressing Change Due 11/21/22 11/21/22 1258   Wound Length (cm) 5 cm 11/21/22 1258   Wound Width (cm) 4 cm 11/21/22 1258   Wound Depth (cm) 2 cm 11/21/22 1258   Wound Surface Area (cm^2) 20 cm^2 11/21/22 1258   Change in Wound Size % (l*w) -19.05 11/21/22 1258   Wound Volume (cm^3) 40 cm^3 11/21/22 1258   Wound Healing % 5 11/21/22 1258   Wound Assessment Exposed structure bone;Slough;Pink/red 11/21/22 1258   Drainage Amount Small 11/21/22 1258   Drainage Description Serosanguinous 11/21/22 1258   Odor Moderate 11/21/22 1258   Carmen-wound Assessment Denuded 11/21/22 1258   Number of days: 0     Pt bed bound at baseline with AMS. Pt skin assessed. Pt has DTI to R heel, dry and intact. BLE dry and flaky. Pt has stage 4 to sacrum with palpable bone. [place] 1.5cm 12-12o clock. Wound with slough, pink and red with moderate odor. Dressed with vashe wet to dry. Would benefit from general surgery consult for possible veraflo wound vac. May need ostomy in future for wound healing depending on goals of care. Plan   Plan of Care:   Stage 4 sacrum: vashe soaked guaze, abd, 2x daily  Barrier film bilateral heels; heel protectors  General surgery consult for possible vac placement?     Specialty Bed Required : Yes   [x] Low Air Loss   [] Pressure Redistribution  [] Fluid Immersion  [] Bariatric  [] Total Pressure Relief  [] Other:     Current Diet: Diet NPO  Dietician consult:  Yes    Discharge Plan:  Placement for patient upon discharge: skilled nursing    Patient appropriate for Outpatient [address]: No    Referrals:  []   [] [facility]  [] Supplies  [] Other    Patient/Caregiver Teaching:  Level of patient/caregiver understanding able to:   [] Indicates understanding       [] Needs reinforcement  [] Unsuccessful      [x] Verbal Understanding  [] Demonstrated understanding       [] No evidence of learning  [] Refused teaching         [] N/A       Electronically signed by Muriel Becerra RN, CWOCN on 11/21/2022 at 1:01 PM

## 2022-11-21 NOTE — PROGRESS NOTES
Hospital Medicine Progress Note      Admit Date: 11/20/2022       CC: F/U for tongue swelling     HPI: 62 y.o. female who presented to [facility] [place] with tongue swelling. Patient is nursing home patient. She has poor baseline. She is contractured. Family had been in the process of getting her enrolled in hospice. She developed acute tongue swelling and hospice nurses and family sent her out for symptom management. No family is present to provide ancillary history. Her tongue is visibly swollen and her lips    Interval History/Subjective: from ER MD note, she does not take any ACEis at home and has not received any antibiotics. She has has no recent changes in meds and no prior hx of angioedema. Still with tongue and lip swelling. Discussed with RN and pharmacy. Will order eliquis for acute PE treatment, which can be crushed and given via PEG. Will order oral solution keppra via PEG as well. Plan to return to [facility] with hospice. She was not taking her maintenance meds of plavix, coreg or statin at the facility prior to admission, so these will not be continued. Review of Systems:       The patient denied headaches, visual changes, LOC, SOB, CP, ABD pain, N/V/D, skin changes, new or worsening weakness or neuromuscular deficits. Comprehensive ROS negative except as mentioned above. Past Medical History:    No past medical history on file. Past Surgical History:        Procedure Laterality Date    GASTROSTOMY TUBE PLACEMENT N/A 10/13/2022    ESOPHAGOGASTRODUODENOSCOPY WITH  PERCUTANEOUS ENDOSCOPIC GASTROSTOMY  TUBE PLACEMENT performed by Charlene Rodriguez MD at [address] [place] [address]  10/13/2022    EGD BIOPSY performed by Charlene Rodriguez MD at [facility] [place]:  Patient has no known allergies. Past medical and surgical history reviewed. Any changes have been noted.      PHYSICAL EXAM:  BP (!) 165/92   Pulse (!) 116 Temp 97.4 °F (36.3 °C) (Axillary)   Resp 16   Wt 134 lb 14.7 oz (61.2 kg)   SpO2 97%   BMI 25.49 kg/m²       Intake/Output Summary (Last 24 hours) at 11/21/2022 0974  Last data filed at 11/21/2022 0505  Gross per 24 hour   Intake 201.23 ml   Output 950 ml   Net -748.77 ml        General appearance:   No apparent distress, appears stated age. Cooperative. HEENT:  lips and tongue angioedema; Normocephalic, atraumatic. PERRLA. Does not follow EOM due to mental status-baseline. Conjunctivae/corneas clear, no icterus, non-injected. Neck: Supple, with full range of motion. No jugular venous distention. Trachea midline. Respiratory:  Normal respiratory effort. Clear to auscultation, bilaterally without Rales/Wheezes/Rhonchi. Cardiovascular:  Regular rate and rhythm without murmurs, rubs or gallops. Abdomen: GTUBE Soft, non-tender, non-distended, without rebound or guarding. Normal bowel sounds. Musculoskeletal:  No clubbing, cyanosis or edema bilaterally. Full range of motion without deformity. Skin: Skin color, texture, turgor normal.  No rashes or lesions. Neurologic:  Neurovascularly intact without any focal sensory/motor deficits. Cranial nerves: II-XII intact, grossly intact. No facial asymmetry, tongue midline.    Psychiatric:  Alert and oriented, thought content appropriate  Capillary Refill: Brisk,< 3 seconds   Peripheral Pulses: +2 palpable, equal bilaterally       LABS:    Lab Results   Component Value Date    WBC 14.5 (H) 11/20/2022    HGB 8.9 (L) 11/20/2022    HCT 29.4 (L) 11/20/2022    MCV 75.9 (L) 11/20/2022     11/20/2022    LYMPHOPCT 6.0 11/20/2022    RBC 3.87 (L) 11/20/2022    MCH 23.1 (L) 11/20/2022    MCHC 30.4 (L) 11/20/2022    RDW 19.0 (H) 11/20/2022       Lab Results   Component Value Date    CREATININE <0.5 (L) 11/20/2022    BUN 18 11/20/2022     (L) 11/20/2022    K 3.8 11/20/2022    CL 94 (L) 11/20/2022    CO2 26 11/20/2022       Lab Results   Component Value Date/Time MG 2.60 11/01/2022 04:32 AM       Lab Results   Component Value Date    ALT 66 (H) 11/20/2022    AST 47 (H) 11/20/2022    ALKPHOS 144 (H) 11/20/2022    BILITOT 0.4 11/20/2022        No flowsheet data found. Lab Results   Component Value Date    LABA1C 5.4 10/07/2022       Imaging:  CT SOFT TISSUE NECK W CONTRAST   Preliminary Result   Acute pulmonary emboli. Labial and glossal edema. No pharyngeal or laryngeal edema. 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      Critical results were called by Dr. Katheryn Spatz, MD to [facility] on   11/20/2022 at 15:39. XR CHEST PORTABLE   Final Result   No acute process. Scheduled and prn Medications:    Scheduled Meds:   HYDROmorphone  0.25 mg IntraVENous Once    morphine 20MG/ML  5 mg Oral BID    sodium chloride flush  5-40 mL IntraVENous 2 times per day    methylPREDNISolone  40 mg IntraVENous Q8H     Continuous Infusions:   heparin (PORCINE) Infusion 1,340 Units/hr (11/21/22 0121)    sodium chloride       PRN Meds:.morphine 20MG/ML, sodium chloride flush, sodium chloride, ondansetron **OR** ondansetron, polyethylene glycol, acetaminophen **OR** acetaminophen    Assessment & Plan:         Angioedema of tongue [T78. 3XXA] 11/20/2022       Priority: Medium   Tongue angioedema  Pulmonary emboli  Debility     PLAN:     Family wants admission for symptom management they do not want her to choke to get from angioedema  I have started her on IV Solu-Medrol- she can have prednisone going forward in PEG for continued swelling  Keep n.p.o.  Started on heparin drip for PEs- switched to eliquis which can be crushed and given in [address]  Palliative care consult  Likely should return to hospice in the next 24 hours if her tongue is less swollen  Haldol other medications other than Roxanol for comfort  Resume keppra oral solution to be given via PEG    Her other home maintenance meds were not being given at the facility prior to admission, only keppra solution and morphine. Therefore, carvedilol, plavix and statin will not be continued here or on d/c. Continue current regimen/therapies. Monitor. Adjust medical regimen as appropriate. Body mass index is 25.49 kg/m². The patient and / or the family were informed of the results of any tests, a time was given to answer questions, a plan was proposed and they agreed with plan.       DVT ppx: hep gtt--> eliquis started      Diet: Diet NPO    Consults:  IP CONSULT TO SOCIAL WORK    DISPO/placement plan: pending     Code Status: DNR-CC      CIPRIANO Vences CNP  11/21/22

## 2022-11-21 NOTE — PLAN OF CARE
Problem: Discharge Planning  Goal: Discharge to home or other facility with appropriate resources  11/21/2022 1709 by Sierra Reid RN  Outcome: Completed  11/21/2022 1347 by Sierra Reid RN  Outcome: Progressing     Problem: Pain  Goal: Verbalizes/displays adequate comfort level or baseline comfort level  11/21/2022 1709 by Sierra Reid RN  Outcome: Completed  11/21/2022 1347 by Sierra Reid RN  Outcome: Progressing     Problem: Safety - Adult  Goal: Free from fall injury  11/21/2022 1709 by Sierra Reid RN  Outcome: Completed  11/21/2022 1347 by Sierra Reid RN  Outcome: Progressing     Problem: ABCDS Injury Assessment  Goal: Absence of physical injury  11/21/2022 1709 by Sierra Reid RN  Outcome: Completed  11/21/2022 1347 by Sierra Reid RN  Outcome: Progressing     Problem: Skin/Tissue Integrity  Goal: Absence of new skin breakdown  Description: 1. Monitor for areas of redness and/or skin breakdown  2. Assess vascular access sites hourly  3. Every 4-6 hours minimum:  Change oxygen saturation probe site  4. Every 4-6 hours:  If on nasal continuous positive airway pressure, respiratory therapy assess nares and determine need for appliance change or resting period.   11/21/2022 1709 by Sierra Reid RN  Outcome: Completed  11/21/2022 1347 by Sierra Reid RN  Outcome: Progressing     Problem: Neurosensory - Adult  Goal: Achieves stable or improved neurological status  11/21/2022 1709 by Sierra Reid RN  Outcome: Completed  11/21/2022 1347 by Sierra Reid RN  Outcome: Progressing  Goal: Absence of seizures  11/21/2022 1709 by Sierra Reid RN  Outcome: Completed  11/21/2022 1347 by Sierra Reid RN  Outcome: Progressing  Goal: Remains free of injury related to seizures activity  11/21/2022 1709 by Sierra Reid RN  Outcome: Completed  11/21/2022 1347 by Sierra Reid RN  Outcome: Progressing  Goal: Achieves maximal functionality and self care  11/21/2022 1709 by Sierra Reid RN  Outcome: Completed  11/21/2022 1347 by Loree Uriarte RN  Outcome: Progressing     Problem: Respiratory - Adult  Goal: Achieves optimal ventilation and oxygenation  11/21/2022 1709 by Loree Uriarte RN  Outcome: Completed  11/21/2022 1347 by Loree Uriarte RN  Outcome: Progressing     Problem: Cardiovascular - Adult  Goal: Maintains optimal cardiac output and hemodynamic stability  11/21/2022 1709 by Loree Uriarte RN  Outcome: Completed  11/21/2022 1347 by Loree Uriarte RN  Outcome: Progressing  Goal: Absence of cardiac dysrhythmias or at baseline  11/21/2022 1709 by Loree Uriarte RN  Outcome: Completed  11/21/2022 1347 by Loree Uriarte RN  Outcome: Progressing     Problem: Skin/Tissue Integrity - Adult  Goal: Skin integrity remains intact  11/21/2022 1709 by Loree Uriarte RN  Outcome: Completed  11/21/2022 1347 by Loree Uriarte RN  Outcome: Progressing  Goal: Incisions, wounds, or drain sites healing without S/S of infection  11/21/2022 1709 by Loree Uriarte, RN  Outcome: Completed  11/21/2022 1347 by Loree Uriarte RN  Outcome: Progressing  Goal: Oral mucous membranes remain intact  11/21/2022 1709 by Loree Uriarte RN  Outcome: Completed  11/21/2022 1347 by Loree Uriarte RN  Outcome: Progressing     Problem: Musculoskeletal - Adult  Goal: Return mobility to safest level of function  11/21/2022 1709 by Loree Uriarte RN  Outcome: Completed  11/21/2022 1347 by Loree Uriarte RN  Outcome: Progressing  Goal: Maintain proper alignment of affected body part  11/21/2022 1709 by Loree Uriarte RN  Outcome: Completed  11/21/2022 1347 by Loree Uriarte RN  Outcome: Progressing  Goal: Return ADL status to a safe level of function  11/21/2022 1709 by Loree Uriarte RN  Outcome: Completed  11/21/2022 1347 by Loree Uriarte RN  Outcome: Progressing     Problem: Genitourinary - Adult  Goal: Urinary catheter remains patent  11/21/2022 1709 by Loree Uriarte RN  Outcome: Completed  11/21/2022 1347 by Loree Uriarte RN  Outcome: Progressing     Problem: Infection - Adult  Goal: Absence of infection at discharge  11/21/2022 1709 by Jhonny Vasques RN  Outcome: Completed  11/21/2022 1347 by Jhonny Vasques RN  Outcome: Progressing  Goal: Absence of infection during hospitalization  11/21/2022 1709 by Jhonny Vasques RN  Outcome: Completed  11/21/2022 1347 by Jhonny Vasques RN  Outcome: Progressing  Goal: Absence of fever/infection during anticipated neutropenic period  11/21/2022 1709 by Jhonny Vasques RN  Outcome: Completed  11/21/2022 1347 by Jhonny Vasques RN  Outcome: Progressing     Problem: Metabolic/Fluid and Electrolytes - Adult  Goal: Electrolytes maintained within normal limits  11/21/2022 1709 by Jhonny Vasques RN  Outcome: Completed  11/21/2022 1347 by Jhonny Vasques RN  Outcome: Progressing  Goal: Hemodynamic stability and optimal renal function maintained  11/21/2022 1709 by Jhonny Vasques RN  Outcome: Completed  11/21/2022 1347 by Jhonny Vasques RN  Outcome: Progressing     Problem: Hematologic - Adult  Goal: Maintains hematologic stability  11/21/2022 1709 by Jhonny Vasques RN  Outcome: Completed  11/21/2022 1347 by Jhonny Vasques RN  Outcome: Progressing

## 2022-11-21 NOTE — CARE COORDINATION
DISCHARGE SUMMARY     DATE OF DISCHARGE: 11/21/2022    [facility]    FACILITY    Level of Care: Intermediate    Discharging to Facility/ Agency   [facility]  Address:  [address]   Phone:  [phone]      TRANSPORTATION: [facility] [address] Name:  [address] [place] [address] Time: [address]    Phone Number: [phone]    COMMENTS: SHANTEL contacted [address] [place] [address] to inform this agency that this pt will be returning to [facility] today. SHANTEL also notified pts son, Bernardo Carl. SHANTEL updated Rahul Hyatt at [facility] with the d/c time.    Electronically signed by Olvni Garcia on 11/21/2022 at 3:16 PM

## 2022-11-21 NOTE — ACP (ADVANCE CARE PLANNING)
Advance Care Planning     Advance Care Planning Activator (Inpatient)  Conversation Note      Date of ACP Conversation: 11/21/2022     Conversation Conducted with: Son(yadiel) Next of Kin by [place]    ACP Activator: [address] Decision Maker:     Current Designated Health Care Decision Maker:     Primary Decision Maker: Desmond Ortiz [facility] - [phone]      Care Preferences    Ventilation: \"If you were in your present state of health and suddenly became very ill and were unable to breathe on your own, what would your preference be about the use of a ventilator (breathing machine) if it were available to you? \"      Would the patient desire the use of ventilator (breathing machine)?: no    \"If your health worsens and it becomes clear that your chance of recovery is unlikely, what would your preference be about the use of a ventilator (breathing machine) if it were available to you? \"     Would the patient desire the use of ventilator (breathing machine)?: No      Resuscitation  \"CPR works best to restart the heart when there is a sudden event, like a heart attack, in someone who is otherwise healthy. Unfortunately, CPR does not typically restart the heart for people who have serious health conditions or who are very sick. \"    \"In the event your heart stopped as a result of an underlying serious health condition, would you want attempts to be made to restart your heart (answer \"yes\" for attempt to resuscitate) or would you prefer a natural death (answer \"no\" for do not attempt to resuscitate)? \" no       [] Yes   [x] No   Educated Patient / Indra Leonard regarding differences between Advance Directives and portable DNR orders.     Length of ACP Conversation in minutes:  3    Conversation Outcomes:  [x] ACP discussion completed  [] Existing advance directive reviewed with patient; no changes to patient's previously recorded wishes  [] New Advance Directive completed  [] Portable Do Not Rescitate prepared for Provider review and signature  [] POLST/POST/MOLST/MOST prepared for Provider review and signature      Follow-up plan:    [] Schedule follow-up conversation to continue planning  [] Referred individual to Provider for additional questions/concerns   [] Advised patient/agent/surrogate to review completed ACP document and update if needed with changes in condition, patient preferences or care setting    [x] This note routed to one or more involved healthcare providers    Electronically signed by Obey Delarosa on 11/21/2022 at 1:50 PM

## 2022-11-21 NOTE — PLAN OF CARE
Problem: Discharge Planning  Goal: Discharge to home or other facility with appropriate resources  11/21/2022 1347 by Kamlesh Vazquez RN  Outcome: Progressing  11/21/2022 0242 by Ector Gold RN  Outcome: Progressing  Flowsheets  Taken 11/21/2022 0204  Discharge to home or other facility with appropriate resources:   Identify barriers to discharge with patient and caregiver   Identify discharge learning needs (meds, wound care, etc)   Refer to discharge planning if patient needs post-hospital services based on physician order or complex needs related to functional status, cognitive ability or social support system   Arrange for needed discharge resources and transportation as appropriate   Arrange for interpreters to assist at discharge as needed  Taken 11/20/2022 2330  Discharge to home or other facility with appropriate resources: Identify barriers to discharge with patient and caregiver     Problem: Pain  Goal: Verbalizes/displays adequate comfort level or baseline comfort level  11/21/2022 1347 by Kamlesh Vazquez RN  Outcome: Progressing  11/21/2022 0242 by Ector Gold RN  Outcome: Progressing  Flowsheets (Taken 11/21/2022 0147)  Verbalizes/displays adequate comfort level or baseline comfort level:   Assess pain using appropriate pain scale   Administer analgesics based on type and severity of pain and evaluate response   Implement non-pharmacological measures as appropriate and evaluate response   Consider cultural and social influences on pain and pain management   Notify Licensed Independent Practitioner if interventions unsuccessful or patient reports new pain     Problem: Safety - Adult  Goal: Free from fall injury  11/21/2022 1347 by Kamlesh Vazquez RN  Outcome: Progressing  11/21/2022 0242 by Ector Gold RN  Outcome: Progressing     Problem: ABCDS Injury Assessment  Goal: Absence of physical injury  11/21/2022 1347 by Kamlesh Vazquez RN  Outcome: Progressing  11/21/2022 0242 by Ector Gold RN  Outcome: Progressing  Flowsheets (Taken 11/21/2022 0158)  Absence of Physical Injury: Implement safety measures based on patient assessment     Problem: Skin/Tissue Integrity  Goal: Absence of new skin breakdown  Description: 1. Monitor for areas of redness and/or skin breakdown  2. Assess vascular access sites hourly  3. Every 4-6 hours minimum:  Change oxygen saturation probe site  4. Every 4-6 hours:  If on nasal continuous positive airway pressure, respiratory therapy assess nares and determine need for appliance change or resting period. 11/21/2022 1347 by Manisha Last RN  Outcome: Progressing  11/21/2022 0242 by Scott Mi RN  Outcome: Progressing     Problem: Neurosensory - Adult  Goal: Achieves stable or improved neurological status  11/21/2022 1347 by Manisha Last RN  Outcome: Progressing  11/21/2022 0242 by Scott Mi RN  Outcome: Progressing  Flowsheets (Taken 11/20/2022 2330)  Achieves stable or improved neurological status: Assess for and report changes in neurological status  Goal: Absence of seizures  11/21/2022 1347 by Manisha Last RN  Outcome: Progressing  11/21/2022 0242 by Scott Mi RN  Outcome: Progressing  Flowsheets (Taken 11/20/2022 2330)  Absence of seizures: Monitor for seizure activity.   If seizure occurs, document type and location of movements and any associated apnea  Goal: Remains free of injury related to seizures activity  11/21/2022 1347 by Manisha Last RN  Outcome: Progressing  11/21/2022 0242 by Scott Mi RN  Outcome: Progressing  Flowsheets (Taken 11/20/2022 2330)  Remains free of injury related to seizure activity: Maintain airway, patient safety  and administer oxygen as ordered  Goal: Achieves maximal functionality and self care  11/21/2022 1347 by Manisha Last RN  Outcome: Progressing  11/21/2022 0242 by Scott Mi RN  Outcome: Progressing  Flowsheets (Taken 11/20/2022 2330)  Achieves maximal functionality and self care: Monitor swallowing and airway patency with patient fatigue and changes in neurological status     Problem: Respiratory - Adult  Goal: Achieves optimal ventilation and oxygenation  11/21/2022 1347 by Rosalene Dandy, RN  Outcome: Progressing  11/21/2022 0242 by Di Strauss RN  Outcome: Progressing  Flowsheets (Taken 11/20/2022 2330)  Achieves optimal ventilation and oxygenation:   Assess for changes in respiratory status   Assess for changes in mentation and behavior   Position to facilitate oxygenation and minimize respiratory effort     Problem: Cardiovascular - Adult  Goal: Maintains optimal cardiac output and hemodynamic stability  11/21/2022 1347 by Rosalene Dandy, RN  Outcome: Progressing  11/21/2022 0242 by Di Strauss RN  Outcome: Progressing  Flowsheets (Taken 11/20/2022 2330)  Maintains optimal cardiac output and hemodynamic stability:   Monitor blood pressure and heart rate   Monitor urine output and notify Licensed Independent Practitioner for values outside of normal range   Assess for signs of decreased cardiac output  Goal: Absence of cardiac dysrhythmias or at baseline  11/21/2022 1347 by Rosalene Dandy, RN  Outcome: Progressing  11/21/2022 0242 by Di Strauss RN  Outcome: Progressing  Flowsheets (Taken 11/20/2022 2330)  Absence of cardiac dysrhythmias or at baseline: Monitor cardiac rate and rhythm     Problem: Skin/Tissue Integrity - Adult  Goal: Skin integrity remains intact  11/21/2022 1347 by Rosalene Dandy, RN  Outcome: Progressing  11/21/2022 0242 by Di Strauss RN  Outcome: Progressing  Flowsheets (Taken 11/20/2022 2330)  Skin Integrity Remains Intact:   Monitor for areas of redness and/or skin breakdown   Assess vascular access sites hourly  Goal: Incisions, wounds, or drain sites healing without S/S of infection  11/21/2022 1347 by Rosalene Dandy, RN  Outcome: Progressing  11/21/2022 0242 by Di Strauss RN  Outcome: Progressing  Goal: Oral mucous membranes remain intact  11/21/2022 0345 74 [id number] 21 by Loree Uriarte RN  Outcome: Progressing  11/21/2022 0242 by Harpal Aguiar RN  Outcome: Progressing     Problem: Musculoskeletal - Adult  Goal: Return mobility to safest level of function  11/21/2022 1347 by Loree Uriarte RN  Outcome: Progressing  11/21/2022 0242 by Harpal Aguiar RN  Outcome: Progressing  Flowsheets (Taken 11/20/2022 2330)  Return Mobility to Safest Level of Function:   Assess patient stability and activity tolerance for standing, transferring and ambulating with or without assistive devices   Assist with transfers and ambulation using safe patient handling equipment as needed   Ensure adequate protection for wounds/incisions during mobilization  Goal: Maintain proper alignment of affected body part  11/21/2022 1347 by Loree Uriarte RN  Outcome: Progressing  11/21/2022 0242 by Harpal Aguiar RN  Outcome: Progressing  Flowsheets (Taken 11/20/2022 2330)  Maintain proper alignment of affected body part: Support and protect limb and body alignment per provider's orders  Goal: Return ADL status to a safe level of function  11/21/2022 1347 by Loree Uriarte RN  Outcome: Progressing  11/21/2022 0242 by Harpal Aguiar RN  Outcome: Progressing  Flowsheets (Taken 11/20/2022 2330)  Return ADL Status to a Safe Level of Function: Administer medication as ordered     Problem: Genitourinary - Adult  Goal: Urinary catheter remains patent  11/21/2022 1347 by Loree Uriarte RN  Outcome: Progressing  11/21/2022 0242 by Harpal Aguiar RN  Outcome: Progressing  Flowsheets (Taken 11/20/2022 2330)  Urinary catheter remains patent: Assess patency of urinary catheter     Problem: Infection - Adult  Goal: Absence of infection at discharge  11/21/2022 1347 by Loree Uriarte RN  Outcome: Progressing  11/21/2022 0242 by Harpal Aguiar RN  Outcome: Progressing  Goal: Absence of infection during hospitalization  11/21/2022 1347 by Loree Uriarte RN  Outcome: Progressing  11/21/2022 0242 by Harpal Aguiar RN  Outcome: Progressing  Goal: Absence of fever/infection during anticipated neutropenic period  11/21/2022 1347 by Loree Uriarte RN  Outcome: Progressing  11/21/2022 0242 by Harpal Aguiar RN  Outcome: Progressing     Problem: Metabolic/Fluid and Electrolytes - Adult  Goal: Electrolytes maintained within normal limits  11/21/2022 1347 by Loree Uriarte RN  Outcome: Progressing  11/21/2022 0242 by Harpal Aguiar RN  Outcome: Progressing  Flowsheets (Taken 11/20/2022 2330)  Electrolytes maintained within normal limits: Monitor labs and assess patient for signs and symptoms of electrolyte imbalances  Goal: Hemodynamic stability and optimal renal function maintained  11/21/2022 1347 by Loree Uriarte RN  Outcome: Progressing  11/21/2022 0242 by Harpal Aguiar RN  Outcome: Progressing  Flowsheets (Taken 11/20/2022 2330)  Hemodynamic stability and optimal renal function maintained: Monitor labs and assess for signs and symptoms of volume excess or deficit     Problem: Hematologic - Adult  Goal: Maintains hematologic stability  11/21/2022 1347 by Loree Uriarte RN  Outcome: Progressing  11/21/2022 0242 by Harpal Aguiar RN  Outcome: Progressing

## 2022-11-21 NOTE — FLOWSHEET NOTE
Patient Head to toe skin assessment completed,Pressure to buttocks, bilateral ears noted. Tongue and lips continue to be swollen, appears to have decreased since start of shift. Patient is non-verbal and unable to answer admission questions.

## 2022-11-21 NOTE — DISCHARGE SUMMARY
Hospital Medicine Discharge Summary    Patient ID: Alice Copeland      Patient's PCP: Vinod Smith MD    Admit Date: 11/20/2022     Discharge Date:   11/21/22    Admitting Physician: Christina Hobbs MD     Discharge Physician: CIPRIANO Enriquez - CNP       Discharge Diagnoses: Active Hospital Problems    Diagnosis Date Noted    Angioedema of tongue [T78. 3XXA] 11/20/2022     Priority: Medium       The patient was seen and examined on day of discharge and this discharge summary is in conjunction with any daily progress note from day of discharge. Disposition:  [] Home  [] Home with home health [] Rehab [] Psych [x] SNF  [] LTAC  [] Long term nursing home or group home [] Transfer to ICU  [] Transfer to PCU [] Other: SNF with Hospice    Hospital Course: 62 y.o. female who presented [facility] [place] [facility] [place] with tongue swelling. Patient is nursing home patient. She has poor baseline. She is contractured. Family had been in the process of getting her enrolled in hospice. She developed acute tongue swelling and hospice nurses and family sent her out for symptom management. No family is present to provide ancillary history. Her tongue is visibly swollen and her lips     11/21: from ER MD note, she does not take any ACEis at home and has not received any antibiotics. She has has no recent changes in meds and no prior hx of angioedema. 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 Given hx CA, this is likely the nidus for her PEs     Still with some tongue and lip swelling today but can continue solumedrol while still in the hospital and oral prednisone on d/c. Discussed with RN and pharmacy. Will order eliquis for acute PE treatment, which can be crushed and given via PEG. Will order oral solution keppra via PEG as well. Plan to return to [facility] with hospice.      She was not taking her maintenance meds of plavix, coreg or statin at the facility prior to admission, so these will not be continued. wound care placed general surgery consult for debridement of wound but this was canceled due to return to hospice            Angioedema of tongue [T78. 3XXA] 11/20/2022       Priority: Medium   Tongue angioedema  Pulmonary emboli  Debility     PLAN:     Family wants admission for symptom management they do not want her to choke to get from angioedema  I have started her on IV Solu-Medrol- she can have prednisone going forward in PEG for continued swelling  Keep n.p.o.  Started on heparin drip for PEs- switched to eliquis which can be crushed and given in [address]  Palliative care consult  Likely should return to hospice in the next 24 hours if her tongue is less swollen  Haldol other medications other than Roxanol for comfort  Resume keppra oral solution to be given via PEG     Her other home maintenance meds were not being given at the facility prior to admission, only keppra solution and morphine. Therefore, carvedilol, plavix and statin will not be continued here or on d/c. Exam:     BP (!) 146/95   Pulse 76   Temp 97.4 °F (36.3 °C) (Axillary)   Resp 16   Wt 134 lb 14.7 oz (61.2 kg)   SpO2 97%   BMI 25.49 kg/m²   General appearance:   No apparent distress, appears stated age. Cooperative. HEENT:  lips and tongue angioedema; Normocephalic, atraumatic. PERRLA. Does not follow EOM due to mental status-baseline. Conjunctivae/corneas clear, no icterus, non-injected. Neck: Supple, with full range of motion. No jugular venous distention. Trachea midline. Respiratory:  Normal respiratory effort. Clear to auscultation, bilaterally without Rales/Wheezes/Rhonchi. Cardiovascular:  Regular rate and rhythm without murmurs, rubs or gallops. Abdomen: GTUBE Soft, non-tender, non-distended, without rebound or guarding. Normal bowel sounds. Musculoskeletal:  No clubbing, cyanosis or edema bilaterally.   Full range of motion without deformity. Skin: Skin color, texture, turgor normal.  No rashes or lesions. Neurologic:  Neurovascularly intact without any focal sensory/motor deficits. Cranial nerves: II-XII intact, grossly intact. No facial asymmetry, tongue midline. Psychiatric:  Alert and oriented, thought content appropriate  Capillary Refill: Brisk,< 3 seconds   Peripheral Pulses: +2 palpable, equal bilaterally          Consults:     IP CONSULT TO SOCIAL WORK  IP CONSULT TO GENERAL SURGERY    Diagnostic tests:    Imaging:  CT SOFT TISSUE NECK W CONTRAST   Preliminary Result   Acute pulmonary emboli. Labial and glossal edema. No pharyngeal or laryngeal edema. 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       Critical results were called by Dr. Jordyn Clements MD to Erichomar Christian on   11/20/2022 at 15:39. XR CHEST PORTABLE   Final Result   No acute process. Labs: For convenience and continuity at follow-up the following most recent labs are provided:      CBC:    Lab Results   Component Value Date/Time    WBC 14.5 11/20/2022 12:06 PM    HGB 8.9 11/20/2022 12:06 PM    HCT 29.4 11/20/2022 12:06 PM     11/20/2022 12:06 PM       Renal:    Lab Results   Component Value Date/Time     11/20/2022 12:06 PM    K 3.8 11/20/2022 12:06 PM    CL 94 11/20/2022 12:06 PM    CO2 26 11/20/2022 12:06 PM    BUN 18 11/20/2022 12:06 PM    CREATININE <0.5 11/20/2022 12:06 PM    CALCIUM 9.8 11/20/2022 12:06 PM    PHOS 2.6 11/01/2022 04:32 AM           Discharge Instructions/Follow-up:  morphine oral solution, keppra oral solution, pepcid, prednisone taper, eliquis via PEG tube     PCP/SNF to follow up: pcp as needed.  Ok to return to SNF with Hospice     D/C condition: poor prognosis- stable    Code status: DNR-CC      Diet: NPO      Discharge Medications:     Current Discharge Medication List             Details   ondansetron (ZOFRAN-ODT) 4 MG disintegrating tablet Take 1 tablet by mouth every 8 hours as needed for Nausea or Vomiting  Qty: 12 tablet, Refills: 0      apixaban (ELIQUIS) 5 MG TABS tablet Give 10mg bid for 7 days, then 5mg bid going forward after that. Qty: 70 tablet, Refills: 1      diphenhydrAMINE (BENADRYL) 12.5 MG/5ML elixir 10 mLs by Per G Tube route 4 times daily as needed for Allergies (swelling of lips/tongue)  Qty: 180 mL, Refills: 2      famotidine (PEPCID) 20 MG tablet 1 tablet by PEG Tube route daily  Qty: 60 tablet, Refills: 3      predniSONE (DELTASONE) 10 MG tablet Take 40mg daily x 3 days, 30mg daily x3 days, 20mg daily x3 days and 10mg daily for 3 days crushed and given via PEG  Qty: 30 tablet, Refills: 0                Details   ! ! Morphine Sulfate (MORPHINE 20MG/ML) 10 MG/0.5ML SOLN conventrated solution Take 0.25 mLs by mouth in the morning and at bedtime for 3 days. Qty: 240 mL, Refills: 0    Comments: Reduce doses taken as pain becomes manageable  Associated Diagnoses: Angioedema of tongue      !! levETIRAcetam (KEPPRA) 100 MG/ML solution Take 10 mLs by mouth 2 times daily  Qty: 473 mL, Refills: 3       !! - Potential duplicate medications found. Please discuss with provider. Details   !! morphine sulfate 20 MG/ML concentrated oral solution Take 5 mg by mouth in the morning and at bedtime. !! levETIRAcetam (KEPPRA) 100 MG/ML solution Take 1,000 mg by mouth 2 times daily       ! ! - Potential duplicate medications found. Please discuss with provider. Time Spent on discharge is more than 30 minutes in the examination, evaluation, counseling and review of medications and discharge plan. Signed:    CIPRIANO Paz - CNP   11/21/2022      Thank you Dakota Ritter MD for the opportunity to be involved in this patient's care. If you have any questions or concerns please feel free to contact me at [phone].

## 2022-11-24 LAB
BLOOD CULTURE, ROUTINE: NORMAL
CULTURE, BLOOD 2: NORMAL

## 2022-12-08 NOTE — DISCHARGE SUMMARY
Hospital Medicine Discharge Summary    Patient ID: Herb De La Paz      Patient's PCP: Mook Rogel MD    Admit Date: 10/13/2022     Discharge Date: 10/17/2022      Admitting Provider: Carla Black MD     Discharge Provider: Ambrocio Hernandez MD     Discharge Diagnoses: Active Hospital Problems    Diagnosis     Acute encephalopathy [G93.40]      Priority: Medium    AMS (altered mental status) [R41.82]      Priority: Medium       The patient was seen and examined on day of discharge and this discharge summary is in conjunction with any daily progress note from day of discharge. Hospital Course:    Acute metabolic encephalopathy   Suspected subclinical seizures in a known seizure disorder patient. Initially patient Pt still w/ minimal response to verbal cues and pain. -MRI Brain without contrast with left MARCELO stroke  -MRA Head and Neck with contrast with no large vessel occlusion  Subclinical seziures on cEEG- neuro recs noted and appreciated. Keppra dosage adjusted per neuro. Acute CVA   -MRI brain with left MARCELO stroke  -Continue on ASA/plavix/statin. -S/p MARLO on 10/12/22 with no cardiac thrombus     UTI:  Cx negative therefore rocephin was discontinued. Hypernatremia  -Likely secondary to severe dehydration- improved w/ fluids. Hypomagnesemia:  Replaced IV. Physical Exam Performed:     /89   Pulse 89   Temp 98.6 °F (37 °C) (Axillary)   Resp 18   Ht 5' (1.524 m)   SpO2 97%   BMI 28.12 kg/m²       General appearance:  no distress  HEENT:  Normal cephalic, atraumatic without obvious deformity. Neck: Supple, with full range of motion. Respiratory:  Normal respiratory effort. Clear to auscultation  Cardiovascular:  Regular rate and rhythm   Neurologic:  weak, unable to do complete neuro exam   intact without any focal sensory/motor deficits. Labs:  For convenience and continuity at follow-up the following most recent labs are provided:      CBC:    Lab Results   Component Value Date/Time    WBC 14.5 11/20/2022 12:06 PM    HGB 8.9 11/20/2022 12:06 PM    HCT 29.4 11/20/2022 12:06 PM     11/20/2022 12:06 PM       Renal:    Lab Results   Component Value Date/Time     11/20/2022 12:06 PM    K 3.8 11/20/2022 12:06 PM    CL 94 11/20/2022 12:06 PM    CO2 26 11/20/2022 12:06 PM    BUN 18 11/20/2022 12:06 PM    CREATININE <0.5 11/20/2022 12:06 PM    CALCIUM 9.8 11/20/2022 12:06 PM    PHOS 2.6 11/01/2022 04:32 AM         Significant Diagnostic Studies    Radiology:   No orders to display          Consults:     IP CONSULT TO NEUROLOGY  IP CONSULT TO DIETITIAN    Disposition:  SNF    Condition at Discharge: Stable    Discharge Instructions/Follow-up:  Pcp    Code Status:  Prior     Activity: activity as tolerated    Diet: regular diet      Discharge Medications:     Discharge Medication List as of 10/17/2022  9:04 PM             Details   levETIRAcetam (KEPPRA) 100 MG/ML solution 10 mLs by Per G Tube route 2 times daily, Disp-600 mL, R-0Normal                Details   aspirin 81 MG chewable tablet Take 81 mg by mouth dailyHistorical Med      atorvastatin (LIPITOR) 80 MG tablet Take 80 mg by mouth nightlyHistorical Med      vitamin D (CHOLECALCIFEROL) 25 MCG (1000 UT) TABS tablet Take 1,000 Units by mouth dailyHistorical Med      clopidogrel (PLAVIX) 75 MG tablet Take 75 mg by mouth dailyHistorical Med      vitamin B-12 (CYANOCOBALAMIN) 1000 MCG tablet Take 1,000 mcg by mouth dailyHistorical Med      ferrous sulfate (IRON 325) 325 (65 Fe) MG tablet Take 325 mg by mouth every other dayHistorical Med      lisinopril (PRINIVIL;ZESTRIL) 40 MG tablet Take 40 mg by mouth dailyHistorical Med      melatonin 3 MG TABS tablet Take 6 mg by mouth at bedtimeHistorical Med      Multiple Vitamin (MULTIVITAMIN ADULT) TABS Take 1 tablet by mouth dailyHistorical Med      mirtazapine (REMERON) 7.5 MG tablet Take 7.5 mg by mouth daily For appetiteHistorical Med      vitamin B-1 (THIAMINE) 100 MG tablet Take 100 mg by mouth dailyHistorical Med      verapamil (CALAN) 120 MG tablet Take 120 mg by mouth at bedtimeHistorical Med             Time Spent on discharge: 31 in the examination, evaluation, counseling and review of medications and discharge plan. Signed:    Eliseo Burton MD   12/8/2022      Thank you Beryl Mariano MD for the opportunity to be involved in this patient's care.

## 2023-09-20 NOTE — PROGRESS NOTES
Clinical Pharmacy Note  Heparin Dosing Consult    Epifanio Marie is a 62 y.o. female ordered heparin per high dose nomogram by Dr. Vidal Aguero. No results found for: APTT  Lab Results   Component Value Date/Time    HGB 8.9 11/20/2022 12:06 PM    HCT 29.4 11/20/2022 12:06 PM     11/20/2022 12:06 PM    INR 1.06 11/20/2022 12:06 PM       Ht Readings from Last 1 Encounters:   11/01/22 5' 1\" (1.549 m)        Wt Readings from Last 1 Encounters:   11/20/22 134 lb 14.7 oz (61.2 kg)        Assessment/Plan:  Initial bolus: 4900 units  Initial infusion rate: 1100 units/hr  Next aPTT: 11/20/22 2200    Pharmacy will continue to monitor adjust heparin based on aPTT results using nomogram below:     VTE/DVT/PE Heparin Nomogram     Initial Bolus: 80 units/kg Max Bolus: 10,000 units       Initial Rate: 18 units/kg/hr Max Initial Rate: 2,100 units/hr     aPTT < 59    Heparin 80 units/kg bolus Increase infusion by 4 units/kg/hr        (maximum 10,000 units)   aPTT 59-72.9    Heparin 40 units/kg bolus Increase infusion by 2 units/kg/hr        (maximum 5,000 units)   aPTT      No bolus   No change   aPTT 102.1-109  No bolus   Decrease infusion by 1 units/kg/hr   aPTT 109.1-122.9   No bolus   Decrease infusion by 2 units/kg/hr   aPTT > 123     Hold heparin for 1 hour Decrease infusion by 3 units/kg/hr    Obtain aPTT 6 hours after initial bolus and 6 hours after any dose change until two consecutive therapeutic aPTTs are achieved - then daily. Medical Necessity Information: It is in your best interest to select a reason for this procedure from the list below. All of these items fulfill various CMS LCD requirements except lesion extends to a margin.

## 2023-12-12 ENCOUNTER — TELEPHONE (OUTPATIENT)
Dept: VASCULAR SURGERY | Age: 58
End: 2023-12-12

## 2023-12-12 NOTE — TELEPHONE ENCOUNTER
Dr. Ponce called in regards to pt. States he was speaking with someone at Dr. Arambula' office about requested authorization when phone call was disconnected. Best callback number is [phone].

## 2023-12-18 PROBLEM — D75.839 THROMBOCYTOSIS: Status: ACTIVE | Noted: 2023-12-18

## 2023-12-18 PROBLEM — A41.9 SEPSIS (HCC): Status: ACTIVE | Noted: 2023-12-18

## 2023-12-18 PROBLEM — D50.9 MICROCYTIC ANEMIA: Status: ACTIVE | Noted: 2023-12-18

## 2023-12-18 PROBLEM — M86.9 OSTEOMYELITIS (HCC): Status: ACTIVE | Noted: 2023-12-18

## 2023-12-18 PROBLEM — R93.89 ABNORMAL CT SCAN: Status: ACTIVE | Noted: 2023-12-18

## 2023-12-18 PROBLEM — I69.30 HISTORY OF CVA WITH RESIDUAL DEFICIT: Status: ACTIVE | Noted: 2023-12-18

## 2023-12-18 PROBLEM — L89.150 DECUBITUS ULCER OF SACRAL REGION, UNSTAGEABLE (HCC): Status: ACTIVE | Noted: 2023-12-18

## 2023-12-18 PROBLEM — N30.01 ACUTE CYSTITIS WITH HEMATURIA: Status: ACTIVE | Noted: 2023-12-18

## 2023-12-18 PROBLEM — E87.1 HYPONATREMIA: Status: ACTIVE | Noted: 2023-12-18

## (undated) DEVICE — BINDER ABD H12IN COT FOR 45-62IN WAIST UNIV PREM 4 PNL DSGN

## (undated) DEVICE — BITE BLOCK ENDOSCP AD 60 FR W/ ADJ STRP PLAS GRN BLOX

## (undated) DEVICE — FORMALIN  10%NBF 20ML PREFLL CONT

## (undated) DEVICE — FORCEPS BX 240CM 2.4MM L NDL RAD JAW 4 M00513334

## (undated) DEVICE — ENDOSCOPY KIT: Brand: MEDLINE INDUSTRIES, INC.

## (undated) DEVICE — TUBE PERC ENDO GASTSTMY 20FR